# Patient Record
Sex: MALE | Race: WHITE | NOT HISPANIC OR LATINO | URBAN - METROPOLITAN AREA
[De-identification: names, ages, dates, MRNs, and addresses within clinical notes are randomized per-mention and may not be internally consistent; named-entity substitution may affect disease eponyms.]

---

## 2021-02-18 ENCOUNTER — INPATIENT (INPATIENT)
Facility: HOSPITAL | Age: 76
LOS: 17 days | Discharge: NURSING, REHAB OR LONG-TERM CARE | DRG: 870 | End: 2021-03-08
Attending: INTERNAL MEDICINE | Admitting: INTERNAL MEDICINE
Payer: MEDICARE

## 2021-02-18 VITALS
RESPIRATION RATE: 27 BRPM | TEMPERATURE: 103 F | HEART RATE: 108 BPM | WEIGHT: 270.29 LBS | SYSTOLIC BLOOD PRESSURE: 142 MMHG | OXYGEN SATURATION: 97 % | DIASTOLIC BLOOD PRESSURE: 108 MMHG

## 2021-02-18 DIAGNOSIS — Z90.49 ACQUIRED ABSENCE OF OTHER SPECIFIED PARTS OF DIGESTIVE TRACT: Chronic | ICD-10-CM

## 2021-02-18 DIAGNOSIS — J96.90 RESPIRATORY FAILURE, UNSPECIFIED, UNSPECIFIED WHETHER WITH HYPOXIA OR HYPERCAPNIA: ICD-10-CM

## 2021-02-18 LAB
ALBUMIN SERPL ELPH-MCNC: 2.5 G/DL — LOW (ref 3.5–5)
ALP SERPL-CCNC: 81 U/L — SIGNIFICANT CHANGE UP (ref 40–120)
ALT FLD-CCNC: 40 U/L DA — SIGNIFICANT CHANGE UP (ref 10–60)
ANION GAP SERPL CALC-SCNC: 4 MMOL/L — LOW (ref 5–17)
APPEARANCE UR: CLEAR — SIGNIFICANT CHANGE UP
APTT BLD: 31.6 SEC — SIGNIFICANT CHANGE UP (ref 27.5–35.5)
AST SERPL-CCNC: 40 U/L — SIGNIFICANT CHANGE UP (ref 10–40)
BACTERIA # UR AUTO: ABNORMAL /HPF
BASE EXCESS BLDA CALC-SCNC: -3.7 MMOL/L — LOW (ref -2–2)
BASE EXCESS BLDV CALC-SCNC: 0.5 MMOL/L — SIGNIFICANT CHANGE UP (ref -2–2)
BASOPHILS # BLD AUTO: 0.01 K/UL — SIGNIFICANT CHANGE UP (ref 0–0.2)
BASOPHILS NFR BLD AUTO: 0.2 % — SIGNIFICANT CHANGE UP (ref 0–2)
BILIRUB SERPL-MCNC: 0.5 MG/DL — SIGNIFICANT CHANGE UP (ref 0.2–1.2)
BILIRUB UR-MCNC: NEGATIVE — SIGNIFICANT CHANGE UP
BLOOD GAS COMMENTS ARTERIAL: SIGNIFICANT CHANGE UP
BLOOD GAS COMMENTS, VENOUS: SIGNIFICANT CHANGE UP
BUN SERPL-MCNC: 40 MG/DL — HIGH (ref 7–18)
CALCIUM SERPL-MCNC: 8.2 MG/DL — LOW (ref 8.4–10.5)
CHLORIDE SERPL-SCNC: 112 MMOL/L — HIGH (ref 96–108)
CO2 SERPL-SCNC: 27 MMOL/L — SIGNIFICANT CHANGE UP (ref 22–31)
COLOR SPEC: YELLOW — SIGNIFICANT CHANGE UP
CREAT SERPL-MCNC: 1.56 MG/DL — HIGH (ref 0.5–1.3)
DIFF PNL FLD: ABNORMAL
EOSINOPHIL # BLD AUTO: 0.04 K/UL — SIGNIFICANT CHANGE UP (ref 0–0.5)
EOSINOPHIL NFR BLD AUTO: 0.6 % — SIGNIFICANT CHANGE UP (ref 0–6)
EPI CELLS # UR: ABNORMAL /HPF
GLUCOSE SERPL-MCNC: 163 MG/DL — HIGH (ref 70–99)
GLUCOSE UR QL: 100 MG/DL
HCO3 BLDA-SCNC: 21 MMOL/L — LOW (ref 23–27)
HCO3 BLDV-SCNC: 26 MMOL/L — SIGNIFICANT CHANGE UP (ref 21–29)
HCT VFR BLD CALC: 41.7 % — SIGNIFICANT CHANGE UP (ref 39–50)
HGB BLD-MCNC: 13.3 G/DL — SIGNIFICANT CHANGE UP (ref 13–17)
HOROWITZ INDEX BLDA+IHG-RTO: 100 — SIGNIFICANT CHANGE UP
HOROWITZ INDEX BLDV+IHG-RTO: 100 — SIGNIFICANT CHANGE UP
IMM GRANULOCYTES NFR BLD AUTO: 0.5 % — SIGNIFICANT CHANGE UP (ref 0–1.5)
INR BLD: 1.38 RATIO — HIGH (ref 0.88–1.16)
KETONES UR-MCNC: NEGATIVE — SIGNIFICANT CHANGE UP
LACTATE SERPL-SCNC: 1.2 MMOL/L — SIGNIFICANT CHANGE UP (ref 0.7–2)
LEUKOCYTE ESTERASE UR-ACNC: ABNORMAL
LYMPHOCYTES # BLD AUTO: 1.03 K/UL — SIGNIFICANT CHANGE UP (ref 1–3.3)
LYMPHOCYTES # BLD AUTO: 16.2 % — SIGNIFICANT CHANGE UP (ref 13–44)
MCHC RBC-ENTMCNC: 31.5 PG — SIGNIFICANT CHANGE UP (ref 27–34)
MCHC RBC-ENTMCNC: 31.9 GM/DL — LOW (ref 32–36)
MCV RBC AUTO: 98.8 FL — SIGNIFICANT CHANGE UP (ref 80–100)
MONOCYTES # BLD AUTO: 0.28 K/UL — SIGNIFICANT CHANGE UP (ref 0–0.9)
MONOCYTES NFR BLD AUTO: 4.4 % — SIGNIFICANT CHANGE UP (ref 2–14)
NEUTROPHILS # BLD AUTO: 4.98 K/UL — SIGNIFICANT CHANGE UP (ref 1.8–7.4)
NEUTROPHILS NFR BLD AUTO: 78.1 % — HIGH (ref 43–77)
NITRITE UR-MCNC: NEGATIVE — SIGNIFICANT CHANGE UP
NRBC # BLD: 0 /100 WBCS — SIGNIFICANT CHANGE UP (ref 0–0)
PCO2 BLDA: 41 MMHG — SIGNIFICANT CHANGE UP (ref 32–46)
PCO2 BLDV: 48 MMHG — SIGNIFICANT CHANGE UP (ref 35–50)
PH BLDA: 7.34 — LOW (ref 7.35–7.45)
PH BLDV: 7.35 — SIGNIFICANT CHANGE UP (ref 7.35–7.45)
PH UR: 6 — SIGNIFICANT CHANGE UP (ref 5–8)
PLATELET # BLD AUTO: 150 K/UL — SIGNIFICANT CHANGE UP (ref 150–400)
PO2 BLDA: 356 MMHG — HIGH (ref 74–108)
PO2 BLDV: 36 MMHG — SIGNIFICANT CHANGE UP (ref 25–45)
POTASSIUM SERPL-MCNC: 4.6 MMOL/L — SIGNIFICANT CHANGE UP (ref 3.5–5.3)
POTASSIUM SERPL-SCNC: 4.6 MMOL/L — SIGNIFICANT CHANGE UP (ref 3.5–5.3)
PROT SERPL-MCNC: 6.6 G/DL — SIGNIFICANT CHANGE UP (ref 6–8.3)
PROT UR-MCNC: 30 MG/DL
PROTHROM AB SERPL-ACNC: 16.2 SEC — HIGH (ref 10.6–13.6)
RBC # BLD: 4.22 M/UL — SIGNIFICANT CHANGE UP (ref 4.2–5.8)
RBC # FLD: 11.5 % — SIGNIFICANT CHANGE UP (ref 10.3–14.5)
RBC CASTS # UR COMP ASSIST: ABNORMAL /HPF (ref 0–2)
SAO2 % BLDA: 100 % — HIGH (ref 92–96)
SAO2 % BLDV: 63 % — LOW (ref 67–88)
SODIUM SERPL-SCNC: 143 MMOL/L — SIGNIFICANT CHANGE UP (ref 135–145)
SP GR SPEC: 1.01 — SIGNIFICANT CHANGE UP (ref 1.01–1.02)
TROPONIN I SERPL-MCNC: 0.02 NG/ML — SIGNIFICANT CHANGE UP (ref 0–0.04)
UROBILINOGEN FLD QL: NEGATIVE — SIGNIFICANT CHANGE UP
WBC # BLD: 6.37 K/UL — SIGNIFICANT CHANGE UP (ref 3.8–10.5)
WBC # FLD AUTO: 6.37 K/UL — SIGNIFICANT CHANGE UP (ref 3.8–10.5)
WBC UR QL: ABNORMAL /HPF (ref 0–5)

## 2021-02-18 PROCEDURE — 36556 INSERT NON-TUNNEL CV CATH: CPT

## 2021-02-18 PROCEDURE — 99291 CRITICAL CARE FIRST HOUR: CPT | Mod: 25

## 2021-02-18 PROCEDURE — 31500 INSERT EMERGENCY AIRWAY: CPT

## 2021-02-18 PROCEDURE — 71045 X-RAY EXAM CHEST 1 VIEW: CPT | Mod: 26

## 2021-02-18 RX ORDER — BUDESONIDE AND FORMOTEROL FUMARATE DIHYDRATE 160; 4.5 UG/1; UG/1
2 AEROSOL RESPIRATORY (INHALATION)
Qty: 0 | Refills: 0 | DISCHARGE

## 2021-02-18 RX ORDER — RIVAROXABAN 15 MG-20MG
20 KIT ORAL DAILY
Refills: 0 | Status: DISCONTINUED | OUTPATIENT
Start: 2021-02-18 | End: 2021-02-19

## 2021-02-18 RX ORDER — VANCOMYCIN HCL 1 G
1000 VIAL (EA) INTRAVENOUS ONCE
Refills: 0 | Status: COMPLETED | OUTPATIENT
Start: 2021-02-18 | End: 2021-02-18

## 2021-02-18 RX ORDER — ETOMIDATE 2 MG/ML
20 INJECTION INTRAVENOUS ONCE
Refills: 0 | Status: COMPLETED | OUTPATIENT
Start: 2021-02-18 | End: 2021-02-18

## 2021-02-18 RX ORDER — CHLORHEXIDINE GLUCONATE 213 G/1000ML
1 SOLUTION TOPICAL
Refills: 0 | Status: DISCONTINUED | OUTPATIENT
Start: 2021-02-18 | End: 2021-03-08

## 2021-02-18 RX ORDER — RIVAROXABAN 15 MG-20MG
1 KIT ORAL
Qty: 0 | Refills: 0 | DISCHARGE

## 2021-02-18 RX ORDER — ACETAMINOPHEN 500 MG
650 TABLET ORAL EVERY 6 HOURS
Refills: 0 | Status: DISCONTINUED | OUTPATIENT
Start: 2021-02-18 | End: 2021-02-19

## 2021-02-18 RX ORDER — NOREPINEPHRINE BITARTRATE/D5W 8 MG/250ML
0.05 PLASTIC BAG, INJECTION (ML) INTRAVENOUS
Qty: 8 | Refills: 0 | Status: DISCONTINUED | OUTPATIENT
Start: 2021-02-18 | End: 2021-02-19

## 2021-02-18 RX ORDER — DEXTROSE 50 % IN WATER 50 %
25 SYRINGE (ML) INTRAVENOUS ONCE
Refills: 0 | Status: DISCONTINUED | OUTPATIENT
Start: 2021-02-18 | End: 2021-03-08

## 2021-02-18 RX ORDER — CHLORHEXIDINE GLUCONATE 213 G/1000ML
15 SOLUTION TOPICAL EVERY 12 HOURS
Refills: 0 | Status: DISCONTINUED | OUTPATIENT
Start: 2021-02-18 | End: 2021-02-26

## 2021-02-18 RX ORDER — ATORVASTATIN CALCIUM 80 MG/1
80 TABLET, FILM COATED ORAL AT BEDTIME
Refills: 0 | Status: DISCONTINUED | OUTPATIENT
Start: 2021-02-18 | End: 2021-02-19

## 2021-02-18 RX ORDER — SODIUM CHLORIDE 9 MG/ML
3000 INJECTION INTRAMUSCULAR; INTRAVENOUS; SUBCUTANEOUS ONCE
Refills: 0 | Status: COMPLETED | OUTPATIENT
Start: 2021-02-18 | End: 2021-02-18

## 2021-02-18 RX ORDER — ACETAMINOPHEN 500 MG
650 TABLET ORAL ONCE
Refills: 0 | Status: COMPLETED | OUTPATIENT
Start: 2021-02-18 | End: 2021-02-18

## 2021-02-18 RX ORDER — INSULIN LISPRO 100/ML
VIAL (ML) SUBCUTANEOUS EVERY 6 HOURS
Refills: 0 | Status: DISCONTINUED | OUTPATIENT
Start: 2021-02-18 | End: 2021-02-26

## 2021-02-18 RX ORDER — PANTOPRAZOLE SODIUM 20 MG/1
40 TABLET, DELAYED RELEASE ORAL DAILY
Refills: 0 | Status: DISCONTINUED | OUTPATIENT
Start: 2021-02-18 | End: 2021-02-26

## 2021-02-18 RX ORDER — PIPERACILLIN AND TAZOBACTAM 4; .5 G/20ML; G/20ML
3.38 INJECTION, POWDER, LYOPHILIZED, FOR SOLUTION INTRAVENOUS ONCE
Refills: 0 | Status: COMPLETED | OUTPATIENT
Start: 2021-02-18 | End: 2021-02-18

## 2021-02-18 RX ORDER — ASPIRIN/CALCIUM CARB/MAGNESIUM 324 MG
81 TABLET ORAL DAILY
Refills: 0 | Status: DISCONTINUED | OUTPATIENT
Start: 2021-02-18 | End: 2021-02-19

## 2021-02-18 RX ORDER — ROCURONIUM BROMIDE 10 MG/ML
100 VIAL (ML) INTRAVENOUS ONCE
Refills: 0 | Status: COMPLETED | OUTPATIENT
Start: 2021-02-18 | End: 2021-02-18

## 2021-02-18 RX ORDER — PROPOFOL 10 MG/ML
5 INJECTION, EMULSION INTRAVENOUS
Qty: 1000 | Refills: 0 | Status: DISCONTINUED | OUTPATIENT
Start: 2021-02-18 | End: 2021-02-22

## 2021-02-18 RX ADMIN — Medication 11.5 MICROGRAM(S)/KG/MIN: at 21:30

## 2021-02-18 RX ADMIN — PIPERACILLIN AND TAZOBACTAM 200 GRAM(S): 4; .5 INJECTION, POWDER, LYOPHILIZED, FOR SOLUTION INTRAVENOUS at 21:02

## 2021-02-18 RX ADMIN — Medication 250 MILLIGRAM(S): at 21:04

## 2021-02-18 RX ADMIN — Medication 100 MILLIGRAM(S): at 20:30

## 2021-02-18 RX ADMIN — PROPOFOL 3.68 MICROGRAM(S)/KG/MIN: 10 INJECTION, EMULSION INTRAVENOUS at 22:03

## 2021-02-18 RX ADMIN — SODIUM CHLORIDE 6000 MILLILITER(S): 9 INJECTION INTRAMUSCULAR; INTRAVENOUS; SUBCUTANEOUS at 21:10

## 2021-02-18 RX ADMIN — ETOMIDATE 20 MILLIGRAM(S): 2 INJECTION INTRAVENOUS at 20:31

## 2021-02-18 RX ADMIN — Medication 650 MILLIGRAM(S): at 21:08

## 2021-02-18 NOTE — H&P ADULT - ATTENDING COMMENTS
MICU ATTENDING. I have personally seen and examined the pt. I was physically present for the key elements service provided. I agree with above history, physical and plan which I edited where appropriate. I total spent 35 min critical care time.  75 from rehab after recent stroke brought unresponsive to Er. Intubated in ER fro airway protection, hypotensive after intubation, required pressors for Bp support.   A/P hypoxic resp failure   shock   URSULA on CKD   UTI  S/P CVA   Altered mental status    MICU   CONT MV, taper FIO2 as tolerated  hydration, pressors for BP support  Monitor urine output, renal function   Panculture, IV antibiotics  CT head  EEG, Neuro eval

## 2021-02-18 NOTE — H&P ADULT - NSICDXPASTMEDICALHX_GEN_ALL_CORE_FT
PAST MEDICAL HISTORY:  BPH (benign prostatic hyperplasia)     Diabetes     Hyperlipidemia     Hypertension     Stroke 7/2020 , 1/2021

## 2021-02-18 NOTE — ED ADULT NURSE NOTE - NSIMPLEMENTINTERV_GEN_ALL_ED
Implemented All Fall Risk Interventions:  Ernul to call system. Call bell, personal items and telephone within reach. Instruct patient to call for assistance. Room bathroom lighting operational. Non-slip footwear when patient is off stretcher. Physically safe environment: no spills, clutter or unnecessary equipment. Stretcher in lowest position, wheels locked, appropriate side rails in place. Provide visual cue, wrist band, yellow gown, etc. Monitor gait and stability. Monitor for mental status changes and reorient to person, place, and time. Review medications for side effects contributing to fall risk. Reinforce activity limits and safety measures with patient and family.

## 2021-02-18 NOTE — H&P ADULT - HISTORY OF PRESENT ILLNESS
75 Y M from [facility] rehab, non ambulatory with PMhx of CVA( twice first in July 2020, second 3 weeks ago), HTN, HLD, Prediabetes,BPH was brought in from NH for unresponsiveness. As per NH papers, pt was lethargic today and then become unresponsive so EMS was called. As per wife Polish speaking(  [id number]) Pt was confused and had memory loss after the stroke 3 weeks ago when he was admitted to [facility] and then discharged to Rehab on 2/8/21. Pt was walking with walker after the first stroke and not much ambulatory after the second stroke. No other history available.     ED course: Pt was unresponsive so was intubated in The ED and Femoral line was placed by the ED physician   Vitals : Febrile to 103 F , , /108  Labs : wbc 6k ,lactate 1.2, Bun/CR 40/1.56  T1 0.018  ECG : NSR   CXR : clear     SH : non smoker , drinks wine ocassionally , no drug use     GOC : FULL CODE

## 2021-02-18 NOTE — ED PROVIDER NOTE - CLINICAL SUMMARY MEDICAL DECISION MAKING FREE TEXT BOX
Pt in respiratory failure, febrile, iv fluids, abx, intubated, icu consulted. Pressors started for hypotension.

## 2021-02-18 NOTE — ED PROVIDER NOTE - OBJECTIVE STATEMENT
75 year old male with a history of hyperlipidemia, htn, cva, diabetes was brought to the ED unresponsive, being given oxygen via BVM by EMS because he was not breathing. No other history available at this time.

## 2021-02-18 NOTE — H&P ADULT - NSHPPHYSICALEXAM_GEN_ALL_CORE
GENERAL: Obese M intubated   EYES: , PERRLA, conj clear  NECK: Supple, No JVD  CHEST/LUNG: dec BS on the right,No rales, rhonchi, wheezing   HEART: Regular rate and rhythm; No murmurs, +ve S1 S2   ABDOMEN: Soft,  Nondistended; Bowel sounds present  NERVOUS SYSTEM:  Sedated      EXTREMITIES:   No clubbing, cyanosis. 1+ pitting edema b/l   LYMPH NODES : non palpable

## 2021-02-18 NOTE — ED PROVIDER NOTE - PROGRESS NOTE DETAILS
[facility] ICU Attending Jay Pt has abnormal penile anatomy, surgical house officer called to assist with jackson.

## 2021-02-18 NOTE — ED ADULT NURSE REASSESSMENT NOTE - NS ED NURSE REASSESS COMMENT FT1
brought in by EMS as notification from NH with respiraTORY  distress. intubated by mariposa adair  .placed on vent  PRVC 100%/500/15/18 size 7.5 ET tube placement at 23 cm rt.lip line. BP 60/40 startted on levophed at 0.05 mcq/kg/min. sepsis protocol initiated. medication given as ordered.

## 2021-02-18 NOTE — H&P ADULT - ASSESSMENT
75 Y M from [facility] rehab, non ambulatory with PMhx of CVA( twice first in July 2020, second 3 weeks ago), HTN, HLD, Prediabetes,BPH was brought in from NH for unresponsiveness.   ED course: Pt was unresponsive so was intubated in The ED and Femoral line was placed by the ED physician   Vitals : Febrile to 103 F , , /108  Labs : wbc 6k ,lactate 1.2, Bun/CR 40/1.56  T1 0.018  ECG : NSR   CXR : clear    Pt will be admitted to ICU for Acute hypoxic Respiratory Failure and Septic shock         Assessment:  1. Acute Hypoxic Respiratory Failure     2. Acute Encephalopathy   3. Septic shock   4. CVA   5. pre-diabetes   6. HTN   7. BPH     Plan:    =====================[CNS ]==============================  # Acute Encephalopathy :    - recent stroke 3 weeks ago , alert ,oriented and follows commands as per NH papers at baseline, confused and memory loss after stroke as per wife   - will obtain CTH   - d/d : stroke vs sepsis   - CXR : clear   - Pain control with fentanyl prn and sedation with propofol     # CVA :   - L post cerebral A stroke as per NH papers   - Pt is on Aspirin, xarelto and statins   - will c/w all if no bleeding on CTH     =====================[CVS ]==============================  # HTN :   - pt is on toprol, losartan and nifedipine   - Hold BP medications as pt is low post intubation   - c/w vasopressors for BP support   - trend trops   - f/u Echo     =====================[RESP ]==============================  # Acute Hypoxic Resp Failure :   - Pt was intubated as pt was unresponsive   - CXR Clear   - f/u CT chest and COVID pcr   - c/w Mech vent     =====================[ GI ]================================  # No issues :   - NGT in place     ====================[ RENAL ]=============================   # URSULA over CKD :   - baseline Cr around 1.4   - Bun/cr 40/1.56   - FC in place   - monitor urine output   - I&Os  - Monitor electrolytes     =====================[ ID ]================================  # Sepsis :   - unknown cause , febrile to 103 F, no wbc count or lactate , CXR clear   - will check UA , CT chest and a/p, PCT   - may need LP if above w/u negative ?   - f/u BCx and UCx   - s/p 1 dose of vanco and zosyn in the ED   - will c/w empirical ABx     ===================[ HEME/ONC ]===========================  # No issues :  - Hb and Plt stable   - monitor cbc daily     =====================[ ENDO ]=============================  # Pre -diabetes : -  - Last A1c 6.2   - target CBG < 180  - FS q6 while NPO, will start on ISS   - Start diet when possible    ==================[ SKIN/ CATHETERS ]======================  # no wound or skin break   # R fem line     ==================[ PROPHYLAXIS ]==========================   # Dvt : Xarelto  # Gi : Protonix     ====================[ DISPO ]==============================   - Monitor in ICU     ===================[ PROGNOSIS ]===========================  - Guarded      75 Y M from [facility] rehab, non ambulatory with PMhx of CVA( twice first in July 2020, second 3 weeks ago), HTN, HLD, Prediabetes,BPH was brought in from NH for unresponsiveness.   ED course: Pt was unresponsive so was intubated in The ED and Femoral line was placed by the ED physician   Vitals : Febrile to 103 F , , /108  Labs : wbc 6k ,lactate 1.2, Bun/CR 40/1.56  T1 0.018  ECG : NSR   CXR : clear    Pt will be admitted to ICU for Acute hypoxic Respiratory Failure and Septic shock         Assessment:  1. Acute Hypoxic Respiratory Failure     2. Acute Encephalopathy   3. Septic shock   4. CVA   5. pre-diabetes   6. HTN   7. BPH     Plan:    =====================[CNS ]==============================  # Acute Encephalopathy :    - recent stroke 3 weeks ago , alert ,oriented and follows commands as per NH papers at baseline, confused and memory loss after stroke as per wife   - will obtain CTH   - d/d : stroke vs sepsis   - CXR : clear   - Pain control with fentanyl prn and sedation with propofol     # CVA :   - L post cerebral A stroke as per NH papers   - Pt is on Aspirin, xarelto and statins   - will c/w all if no bleeding on CTH     =====================[CVS ]==============================  # Septic shock :   - likely 2/2 Urosepsis   - UA +ve   - c/w vasopressor support   - will start on Rocephin   - f/u CT a/p   - f/u BCx ,UCx     # HTN :   - pt is on toprol, losartan and nifedipine   - Hold BP medications   - c/w vasopressors for BP support   - trend trops   - f/u Echo     =====================[RESP ]==============================  # Acute Hypoxic Resp Failure :   - Pt was intubated as pt was unresponsive   - CXR Clear   - f/u CT chest and COVID pcr   - c/w Mech vent     =====================[ GI ]================================  # No issues :   - NGT in place     ====================[ RENAL ]=============================   # URSULA over CKD :   - baseline Cr around 1.4   - Bun/cr 40/1.56   - FC in place   - monitor urine output   - I&Os  - Monitor electrolytes     =====================[ ID ]================================  # Urosepsis :   -  febrile to 103 F, no wbc count or lactate , CXR clear   - UA +ve   - f/u BCx and UCx, PCt    - s/p 1 dose of vanco and zosyn in the ED   - will c/w empirical ABx     ===================[ HEME/ONC ]===========================  # No issues :  - Hb and Plt stable   - monitor cbc daily     =====================[ ENDO ]=============================  # Pre -diabetes : -  - Last A1c 6.2   - target CBG < 180  - FS q6 while NPO, will start on ISS   - Start diet when possible    ==================[ SKIN/ CATHETERS ]======================  # no wound or skin break   # R fem line     ==================[ PROPHYLAXIS ]==========================   # Dvt : Xarelto  # Gi : Protonix     ====================[ DISPO ]==============================   - Monitor in ICU     ===================[ PROGNOSIS ]===========================  - Guarded      75 Y M from [facility] rehab, non ambulatory with PMhx of CVA( twice first in July 2020, second 3 weeks ago), HTN, HLD, Prediabetes,BPH was brought in from NH for unresponsiveness.   ED course: Pt was unresponsive so was intubated in The ED and Femoral line was placed by the ED physician   Vitals : Febrile to 103 F , , /108  Labs : wbc 6k ,lactate 1.2, Bun/CR 40/1.56  T1 0.018  ECG : NSR   CXR : clear    Pt will be admitted to ICU for Acute hypoxic Respiratory Failure and Septic shock         Assessment:  1. Acute Hypoxic Respiratory Failure     2. Acute Encephalopathy   3. Septic shock   4. CVA   5. pre-diabetes   6. HTN   7. BPH     Plan:    =====================[CNS ]==============================  # Acute Encephalopathy :    - recent stroke 3 weeks ago , alert ,oriented and follows commands as per NH papers at baseline, confused and memory loss after stroke as per wife   - will obtain CTH   - d/d : stroke vs sepsis   - CXR : clear   - Pain control with fentanyl prn and sedation with propofol     # CVA :   - L post cerebral A stroke as per NH papers   - Pt is on Aspirin, xarelto and statins   - will c/w all if no bleeding on CTH   - consult Neuro TBD     =====================[CVS ]==============================  # Septic shock :   - likely 2/2 Urosepsis   - UA +ve   - c/w vasopressor support   - will start on Meropenem 1g q12 for now , can de-escalate pending culture results  - f/u CT a/p   - f/u BCx ,UCx     # HTN :   - pt is on toprol, losartan and nifedipine   - Hold BP medications   - c/w vasopressors for BP support   - trend trops   - f/u Echo     =====================[RESP ]==============================  # Acute Hypoxic Resp Failure :   - Pt was intubated as pt was unresponsive   - CXR Clear   - f/u CT chest and COVID pcr   - c/w Mech vent     =====================[ GI ]================================  # No issues :   - NGT in place     ====================[ RENAL ]=============================   # URSULA over CKD :   - baseline Cr around 1.4   - Bun/cr 40/1.56   - FC in place   - will give gentle hydration w/LR @ 75 cc x 12hrs  - monitor urine output   - I&Os  - Monitor electrolytes     =====================[ ID ]================================  # Urosepsis :   -  febrile to 103 F, no wbc count or lactate , CXR clear   - UA +ve   - f/u BCx and UCx, PCt    - s/p 1 dose of vanco and zosyn in the ED   - will c/w empirical ABx     ===================[ HEME/ONC ]===========================  # No issues :  - Hb and Plt stable   - monitor cbc daily     =====================[ ENDO ]=============================  # Pre -diabetes : -  - Last A1c 6.2   - target CBG < 180  - FS q6 while NPO, will start on ISS   - Start diet when possible    ==================[ SKIN/ CATHETERS ]======================  # no wound or skin break   # R fem line     ==================[ PROPHYLAXIS ]==========================   # Dvt : Xarelto  # Gi : Protonix     ====================[ DISPO ]==============================   - Monitor in ICU     ===================[ PROGNOSIS ]===========================  - Guarded

## 2021-02-18 NOTE — ED ADULT NURSE NOTE - OBJECTIVE STATEMENT
brought in  by ems from NH as Notification for  respiratory distress.intubated by  given rocuromium 100 mg & [id number] mg.placed on vent setting PRVC 100% ,15/18 on CM with NSR

## 2021-02-19 LAB
24R-OH-CALCIDIOL SERPL-MCNC: 38.6 NG/ML — SIGNIFICANT CHANGE UP (ref 30–80)
ALBUMIN SERPL ELPH-MCNC: 2.1 G/DL — LOW (ref 3.5–5)
ALBUMIN SERPL ELPH-MCNC: 2.2 G/DL — LOW (ref 3.5–5)
ALP SERPL-CCNC: 78 U/L — SIGNIFICANT CHANGE UP (ref 40–120)
ALP SERPL-CCNC: 79 U/L — SIGNIFICANT CHANGE UP (ref 40–120)
ALT FLD-CCNC: 37 U/L DA — SIGNIFICANT CHANGE UP (ref 10–60)
ALT FLD-CCNC: 42 U/L DA — SIGNIFICANT CHANGE UP (ref 10–60)
ANION GAP SERPL CALC-SCNC: 4 MMOL/L — LOW (ref 5–17)
ANION GAP SERPL CALC-SCNC: 6 MMOL/L — SIGNIFICANT CHANGE UP (ref 5–17)
AST SERPL-CCNC: 34 U/L — SIGNIFICANT CHANGE UP (ref 10–40)
AST SERPL-CCNC: 42 U/L — HIGH (ref 10–40)
BASE EXCESS BLDA CALC-SCNC: -2.6 MMOL/L — LOW (ref -2–2)
BASOPHILS # BLD AUTO: 0.02 K/UL — SIGNIFICANT CHANGE UP (ref 0–0.2)
BASOPHILS NFR BLD AUTO: 0.1 % — SIGNIFICANT CHANGE UP (ref 0–2)
BILIRUB SERPL-MCNC: 0.5 MG/DL — SIGNIFICANT CHANGE UP (ref 0.2–1.2)
BILIRUB SERPL-MCNC: 0.8 MG/DL — SIGNIFICANT CHANGE UP (ref 0.2–1.2)
BLOOD GAS COMMENTS ARTERIAL: SIGNIFICANT CHANGE UP
BUN SERPL-MCNC: 32 MG/DL — HIGH (ref 7–18)
BUN SERPL-MCNC: 40 MG/DL — HIGH (ref 7–18)
CALCIUM SERPL-MCNC: 8.1 MG/DL — LOW (ref 8.4–10.5)
CALCIUM SERPL-MCNC: 8.5 MG/DL — SIGNIFICANT CHANGE UP (ref 8.4–10.5)
CHLORIDE SERPL-SCNC: 111 MMOL/L — HIGH (ref 96–108)
CHLORIDE SERPL-SCNC: 114 MMOL/L — HIGH (ref 96–108)
CHOLEST SERPL-MCNC: 83 MG/DL — SIGNIFICANT CHANGE UP
CO2 SERPL-SCNC: 24 MMOL/L — SIGNIFICANT CHANGE UP (ref 22–31)
CO2 SERPL-SCNC: 27 MMOL/L — SIGNIFICANT CHANGE UP (ref 22–31)
CREAT SERPL-MCNC: 1.21 MG/DL — SIGNIFICANT CHANGE UP (ref 0.5–1.3)
CREAT SERPL-MCNC: 1.51 MG/DL — HIGH (ref 0.5–1.3)
E FAECALIS DNA BLD POS QL NAA+NON-PROBE: SIGNIFICANT CHANGE UP
EOSINOPHIL # BLD AUTO: 0.1 K/UL — SIGNIFICANT CHANGE UP (ref 0–0.5)
EOSINOPHIL NFR BLD AUTO: 0.7 % — SIGNIFICANT CHANGE UP (ref 0–6)
GLUCOSE BLDC GLUCOMTR-MCNC: 107 MG/DL — HIGH (ref 70–99)
GLUCOSE BLDC GLUCOMTR-MCNC: 134 MG/DL — HIGH (ref 70–99)
GLUCOSE BLDC GLUCOMTR-MCNC: 153 MG/DL — HIGH (ref 70–99)
GLUCOSE BLDC GLUCOMTR-MCNC: 69 MG/DL — LOW (ref 70–99)
GLUCOSE BLDC GLUCOMTR-MCNC: 86 MG/DL — SIGNIFICANT CHANGE UP (ref 70–99)
GLUCOSE SERPL-MCNC: 109 MG/DL — HIGH (ref 70–99)
GLUCOSE SERPL-MCNC: 135 MG/DL — HIGH (ref 70–99)
GRAM STN FLD: SIGNIFICANT CHANGE UP
HCO3 BLDA-SCNC: 22 MMOL/L — LOW (ref 23–27)
HCT VFR BLD CALC: 41.3 % — SIGNIFICANT CHANGE UP (ref 39–50)
HCV AB S/CO SERPL IA: 0.29 S/CO — SIGNIFICANT CHANGE UP (ref 0–0.99)
HCV AB SERPL-IMP: SIGNIFICANT CHANGE UP
HDLC SERPL-MCNC: 30 MG/DL — LOW
HGB BLD-MCNC: 13.1 G/DL — SIGNIFICANT CHANGE UP (ref 13–17)
HOROWITZ INDEX BLDA+IHG-RTO: 50 — SIGNIFICANT CHANGE UP
IMM GRANULOCYTES NFR BLD AUTO: 0.7 % — SIGNIFICANT CHANGE UP (ref 0–1.5)
LIPID PNL WITH DIRECT LDL SERPL: 31 MG/DL — SIGNIFICANT CHANGE UP
LYMPHOCYTES # BLD AUTO: 1.63 K/UL — SIGNIFICANT CHANGE UP (ref 1–3.3)
LYMPHOCYTES # BLD AUTO: 12.2 % — LOW (ref 13–44)
MAGNESIUM SERPL-MCNC: 1.9 MG/DL — SIGNIFICANT CHANGE UP (ref 1.6–2.6)
MCHC RBC-ENTMCNC: 31.4 PG — SIGNIFICANT CHANGE UP (ref 27–34)
MCHC RBC-ENTMCNC: 31.7 GM/DL — LOW (ref 32–36)
MCV RBC AUTO: 99 FL — SIGNIFICANT CHANGE UP (ref 80–100)
METHOD TYPE: SIGNIFICANT CHANGE UP
MONOCYTES # BLD AUTO: 0.54 K/UL — SIGNIFICANT CHANGE UP (ref 0–0.9)
MONOCYTES NFR BLD AUTO: 4 % — SIGNIFICANT CHANGE UP (ref 2–14)
NEUTROPHILS # BLD AUTO: 10.96 K/UL — HIGH (ref 1.8–7.4)
NEUTROPHILS NFR BLD AUTO: 82.3 % — HIGH (ref 43–77)
NON HDL CHOLESTEROL: 53 MG/DL — SIGNIFICANT CHANGE UP
NRBC # BLD: 0 /100 WBCS — SIGNIFICANT CHANGE UP (ref 0–0)
PCO2 BLDA: 40 MMHG — SIGNIFICANT CHANGE UP (ref 32–46)
PH BLDA: 7.36 — SIGNIFICANT CHANGE UP (ref 7.35–7.45)
PHOSPHATE SERPL-MCNC: 3 MG/DL — SIGNIFICANT CHANGE UP (ref 2.5–4.5)
PLATELET # BLD AUTO: 153 K/UL — SIGNIFICANT CHANGE UP (ref 150–400)
PO2 BLDA: 107 MMHG — SIGNIFICANT CHANGE UP (ref 74–108)
POTASSIUM SERPL-MCNC: 4.3 MMOL/L — SIGNIFICANT CHANGE UP (ref 3.5–5.3)
POTASSIUM SERPL-MCNC: 4.5 MMOL/L — SIGNIFICANT CHANGE UP (ref 3.5–5.3)
POTASSIUM SERPL-SCNC: 4.3 MMOL/L — SIGNIFICANT CHANGE UP (ref 3.5–5.3)
POTASSIUM SERPL-SCNC: 4.5 MMOL/L — SIGNIFICANT CHANGE UP (ref 3.5–5.3)
PROCALCITONIN SERPL-MCNC: 0.42 NG/ML — HIGH (ref 0.02–0.1)
PROT SERPL-MCNC: 6.3 G/DL — SIGNIFICANT CHANGE UP (ref 6–8.3)
PROT SERPL-MCNC: 6.4 G/DL — SIGNIFICANT CHANGE UP (ref 6–8.3)
RBC # BLD: 4.17 M/UL — LOW (ref 4.2–5.8)
RBC # FLD: 11.8 % — SIGNIFICANT CHANGE UP (ref 10.3–14.5)
SAO2 % BLDA: 98 % — HIGH (ref 92–96)
SARS-COV-2 IGG SERPL QL IA: NEGATIVE — SIGNIFICANT CHANGE UP
SARS-COV-2 IGM SERPL IA-ACNC: 0.09 INDEX — SIGNIFICANT CHANGE UP
SARS-COV-2 RNA SPEC QL NAA+PROBE: SIGNIFICANT CHANGE UP
SODIUM SERPL-SCNC: 141 MMOL/L — SIGNIFICANT CHANGE UP (ref 135–145)
SODIUM SERPL-SCNC: 145 MMOL/L — SIGNIFICANT CHANGE UP (ref 135–145)
SPECIMEN SOURCE: SIGNIFICANT CHANGE UP
TRIGL SERPL-MCNC: 108 MG/DL — SIGNIFICANT CHANGE UP
TROPONIN I SERPL-MCNC: 0.11 NG/ML — HIGH (ref 0–0.04)
TROPONIN I SERPL-MCNC: 0.17 NG/ML — HIGH (ref 0–0.04)
TSH SERPL-MCNC: 0.48 UU/ML — SIGNIFICANT CHANGE UP (ref 0.34–4.82)
VIT B12 SERPL-MCNC: 320 PG/ML — SIGNIFICANT CHANGE UP (ref 232–1245)
WBC # BLD: 13.34 K/UL — HIGH (ref 3.8–10.5)
WBC # FLD AUTO: 13.34 K/UL — HIGH (ref 3.8–10.5)

## 2021-02-19 PROCEDURE — 71045 X-RAY EXAM CHEST 1 VIEW: CPT | Mod: 26

## 2021-02-19 PROCEDURE — 99291 CRITICAL CARE FIRST HOUR: CPT

## 2021-02-19 PROCEDURE — 71250 CT THORAX DX C-: CPT | Mod: 26

## 2021-02-19 PROCEDURE — 70450 CT HEAD/BRAIN W/O DYE: CPT | Mod: 26

## 2021-02-19 PROCEDURE — 74176 CT ABD & PELVIS W/O CONTRAST: CPT | Mod: 26

## 2021-02-19 RX ORDER — SODIUM CHLORIDE 9 MG/ML
1000 INJECTION, SOLUTION INTRAVENOUS
Refills: 0 | Status: DISCONTINUED | OUTPATIENT
Start: 2021-02-19 | End: 2021-02-25

## 2021-02-19 RX ORDER — VANCOMYCIN HCL 1 G
1000 VIAL (EA) INTRAVENOUS EVERY 24 HOURS
Refills: 0 | Status: DISCONTINUED | OUTPATIENT
Start: 2021-02-20 | End: 2021-02-20

## 2021-02-19 RX ORDER — ACETAMINOPHEN 500 MG
650 TABLET ORAL EVERY 6 HOURS
Refills: 0 | Status: DISCONTINUED | OUTPATIENT
Start: 2021-02-19 | End: 2021-03-08

## 2021-02-19 RX ORDER — VANCOMYCIN HCL 1 G
VIAL (EA) INTRAVENOUS
Refills: 0 | Status: DISCONTINUED | OUTPATIENT
Start: 2021-02-19 | End: 2021-02-20

## 2021-02-19 RX ORDER — VANCOMYCIN HCL 1 G
1000 VIAL (EA) INTRAVENOUS ONCE
Refills: 0 | Status: COMPLETED | OUTPATIENT
Start: 2021-02-19 | End: 2021-02-19

## 2021-02-19 RX ORDER — ASPIRIN/CALCIUM CARB/MAGNESIUM 324 MG
81 TABLET ORAL DAILY
Refills: 0 | Status: DISCONTINUED | OUTPATIENT
Start: 2021-02-19 | End: 2021-03-08

## 2021-02-19 RX ORDER — MEROPENEM 1 G/30ML
1000 INJECTION INTRAVENOUS EVERY 12 HOURS
Refills: 0 | Status: DISCONTINUED | OUTPATIENT
Start: 2021-02-19 | End: 2021-02-22

## 2021-02-19 RX ORDER — ATORVASTATIN CALCIUM 80 MG/1
80 TABLET, FILM COATED ORAL AT BEDTIME
Refills: 0 | Status: DISCONTINUED | OUTPATIENT
Start: 2021-02-19 | End: 2021-03-08

## 2021-02-19 RX ORDER — RIVAROXABAN 15 MG-20MG
15 KIT ORAL
Refills: 0 | Status: DISCONTINUED | OUTPATIENT
Start: 2021-02-19 | End: 2021-02-20

## 2021-02-19 RX ORDER — NOREPINEPHRINE BITARTRATE/D5W 8 MG/250ML
0.05 PLASTIC BAG, INJECTION (ML) INTRAVENOUS
Qty: 16 | Refills: 0 | Status: DISCONTINUED | OUTPATIENT
Start: 2021-02-19 | End: 2021-02-24

## 2021-02-19 RX ORDER — SODIUM CHLORIDE 9 MG/ML
1000 INJECTION, SOLUTION INTRAVENOUS
Refills: 0 | Status: COMPLETED | OUTPATIENT
Start: 2021-02-19 | End: 2021-02-19

## 2021-02-19 RX ORDER — FENTANYL CITRATE 50 UG/ML
25 INJECTION INTRAVENOUS EVERY 4 HOURS
Refills: 0 | Status: DISCONTINUED | OUTPATIENT
Start: 2021-02-19 | End: 2021-02-20

## 2021-02-19 RX ADMIN — Medication 1: at 01:53

## 2021-02-19 RX ADMIN — MEROPENEM 100 MILLIGRAM(S): 1 INJECTION INTRAVENOUS at 17:39

## 2021-02-19 RX ADMIN — CHLORHEXIDINE GLUCONATE 1 APPLICATION(S): 213 SOLUTION TOPICAL at 05:00

## 2021-02-19 RX ADMIN — SODIUM CHLORIDE 75 MILLILITER(S): 9 INJECTION, SOLUTION INTRAVENOUS at 05:01

## 2021-02-19 RX ADMIN — CHLORHEXIDINE GLUCONATE 15 MILLILITER(S): 213 SOLUTION TOPICAL at 17:39

## 2021-02-19 RX ADMIN — RIVAROXABAN 15 MILLIGRAM(S): KIT at 15:49

## 2021-02-19 RX ADMIN — Medication 250 MILLIGRAM(S): at 18:07

## 2021-02-19 RX ADMIN — PANTOPRAZOLE SODIUM 40 MILLIGRAM(S): 20 TABLET, DELAYED RELEASE ORAL at 11:24

## 2021-02-19 RX ADMIN — Medication 5.75 MICROGRAM(S)/KG/MIN: at 05:04

## 2021-02-19 RX ADMIN — ATORVASTATIN CALCIUM 80 MILLIGRAM(S): 80 TABLET, FILM COATED ORAL at 23:06

## 2021-02-19 RX ADMIN — PROPOFOL 3.68 MICROGRAM(S)/KG/MIN: 10 INJECTION, EMULSION INTRAVENOUS at 17:48

## 2021-02-19 RX ADMIN — SODIUM CHLORIDE 75 MILLILITER(S): 9 INJECTION, SOLUTION INTRAVENOUS at 17:40

## 2021-02-19 RX ADMIN — PROPOFOL 3.68 MICROGRAM(S)/KG/MIN: 10 INJECTION, EMULSION INTRAVENOUS at 08:11

## 2021-02-19 RX ADMIN — MEROPENEM 100 MILLIGRAM(S): 1 INJECTION INTRAVENOUS at 05:03

## 2021-02-19 NOTE — CONSULT NOTE ADULT - SUBJECTIVE AND OBJECTIVE BOX
Time of visit:    CHIEF COMPLAINT: Patient is a 75y old  Male who presents with a chief complaint of Unresponsive (2021 12:19)      HPI:  75 Y M from [facility] rehab, non ambulatory with PMhx of CVA( twice first in 2020, second 3 weeks ago), HTN, HLD, Prediabetes,BPH was brought in from NH for unresponsiveness. As per NH papers, pt was lethargic today and then become unresponsive so EMS was called. As per wife Polish speaking(  [id number]) Pt was confused and had memory loss after the stroke 3 weeks ago when he was admitted to [facility] and then discharged to Rehab on 21. Pt was walking with walker after the first stroke and not much ambulatory after the second stroke. No other history available.     ED course: Pt was unresponsive so was intubated in The ED and Femoral line was placed by the ED physician   Vitals : Febrile to 103 F , , /108  Labs : wbc 6k ,lactate 1.2, Bun/CR 40/1.56  T1 0.018  ECG : NSR   CXR : clear     SH : non smoker , drinks wine ocassionally , no drug use     GOC : FULL CODE  (2021 22:53)   Patient seen and examined.     PAST MEDICAL & SURGICAL HISTORY:  BPH (benign prostatic hyperplasia)    Hyperlipidemia    Hypertension    Diabetes    Stroke  2020 , 2021    S/P cholecystectomy        Allergies    No Known Allergies    Intolerances        MEDICATIONS  (STANDING):  aspirin  chewable 81 milliGRAM(s) Oral daily  atorvastatin 80 milliGRAM(s) Oral at bedtime  chlorhexidine 0.12% Liquid 15 milliLiter(s) Oral Mucosa every 12 hours  chlorhexidine 2% Cloths 1 Application(s) Topical <User Schedule>  dextrose 50% Injectable 25 Gram(s) IV Push once  insulin lispro (ADMELOG) corrective regimen sliding scale   SubCutaneous every 6 hours  lactated ringers. 1000 milliLiter(s) (75 mL/Hr) IV Continuous <Continuous>  meropenem  IVPB 1000 milliGRAM(s) IV Intermittent every 12 hours  norepinephrine Infusion 0.05 MICROgram(s)/kG/Min (5.75 mL/Hr) IV Continuous <Continuous>  pantoprazole  Injectable 40 milliGRAM(s) IV Push daily  propofol Infusion 5 MICROgram(s)/kG/Min (3.68 mL/Hr) IV Continuous <Continuous>  rivaroxaban 15 milliGRAM(s) Oral with dinner      MEDICATIONS  (PRN):  acetaminophen    Suspension .. 650 milliGRAM(s) Oral every 6 hours PRN Temp greater or equal to 38C (100.4F), Mild Pain (1 - 3)  fentaNYL    Injectable 25 MICROGram(s) IV Push every 4 hours PRN Severe Pain (7 - 10)   Medications up to date at time of exam.    Medications up to date at time of exam.    FAMILY HISTORY:      SOCIAL HISTORY  Smoking History: [   ] smoking/smoke exposure, [   ] former smoker  Living Condition: [   ] apartment, [   ] private house  Work History:   Travel History: denies recent travel  Illicit Substance Use: denies  Alcohol Use: denies    REVIEW OF SYSTEMS:    CONSTITUTIONAL:  denies fevers, chills, sweats, weight loss    HEENT:  denies diplopia or blurred vision, sore throat or runny nose.    CARDIOVASCULAR:  denies pressure, squeezing, tightness, or heaviness about the chest; no palpitations.    RESPIRATORY:  denies SOB, cough, MARRERO, wheezing.    GASTROINTESTINAL:  denies abdominal pain, nausea, vomiting or diarrhea.    GENITOURINARY: denies dysuria, frequency or urgency.    NEUROLOGIC:  denies numbness, tingling, seizures or weakness.    PSYCHIATRIC:  denies disorder of thought or mood.    MSK: denies swelling, redness      PHYSICAL EXAMINATION:    GENERAL: The patient is a well-developed, well-nourished, in no apparent distress.     Vital Signs Last 24 Hrs  T(C): 37.8 (2021 12:00), Max: 39.4 (2021 20:16)  T(F): 100 (2021 12:00), Max: 103 (2021 20:16)  HR: 81 (2021 12:46) (74 - 114)  BP: 159/71 (2021 12:00) (60/40 - 159/71)  BP(mean): 97 (2021 12:00) (80 - 105)  RR: 18 (2021 12:00) (18 - 27)  SpO2: 97% (2021 12:46) (97% - 100%)  Mode: AC/ CMV (Assist Control/ Continuous Mandatory Ventilation)  RR (machine): 18  TV (machine): 450  FiO2: 40  PEEP: 5  ITime: 1  MAP: 10  PIP: 26   (if applicable)    Chest Tube (if applicable)    HEENT: Head is normocephalic and atraumatic. Extraocular muscles are intact. Mucous membranes are moist.     NECK: Supple, no palpable adenopathy.    LUNGS: Clear to auscultation, no wheezing, rales, or rhonchi.    HEART: Regular rate and rhythm without murmur.    ABDOMEN: Soft, nontender, and nondistended.  No hepatosplenomegaly is noted.    RENAL: No difficulty voiding, no pelvic pain    EXTREMITIES: Without any cyanosis, clubbing, rash, lesions or edema.    NEUROLOGIC: Awake, alert, oriented, grossly intact    SKIN: Warm, dry, good turgor.      LABS:                        13.1   13.34 )-----------( 153      ( 2021 06:43 )             41.3         141  |  111<H>  |  40<H>  ----------------------------<  135<H>  4.3   |  24  |  1.51<H>    Ca    8.1<L>      2021 06:43  Phos  3.0     -  Mg     1.9         TPro  6.4  /  Alb  2.2<L>  /  TBili  0.8  /  DBili  x   /  AST  42<H>  /  ALT  42  /  AlkPhos  78  -    PT/INR - ( 2021 20:34 )   PT: 16.2 sec;   INR: 1.38 ratio         PTT - ( 2021 20:34 )  PTT:31.6 sec  Urinalysis Basic - ( 2021 23:07 )    Color: Yellow / Appearance: Clear / S.010 / pH: x  Gluc: x / Ketone: Negative  / Bili: Negative / Urobili: Negative   Blood: x / Protein: 30 mg/dL / Nitrite: Negative   Leuk Esterase: Small / RBC: 10-25 /HPF / WBC 6-10 /HPF   Sq Epi: x / Non Sq Epi: Occasional /HPF / Bacteria: Many /HPF      ABG - ( 2021 03:24 )  pH, Arterial: 7.36  pH, Blood: x     /  pCO2: 40    /  pO2: 107   / HCO3: 22    / Base Excess: -2.6  /  SaO2: 98                CARDIAC MARKERS ( 2021 06:43 )  0.166 ng/mL / x     / x     / x     / x      CARDIAC MARKERS ( 2021 20:34 )  0.018 ng/mL / x     / x     / x     / x              Lactate, Blood: 1.2 mmol/L (21 @ 20:34)    Procalcitonin, Serum: 0.42 ng/mL (21 @ 10:00)      MICROBIOLOGY: (if applicable)    RADIOLOGY & ADDITIONAL STUDIES:  EKG:   CXR:  ECHO:    IMPRESSION: 75y Male PAST MEDICAL & SURGICAL HISTORY:  BPH (benign prostatic hyperplasia)    Hyperlipidemia    Hypertension    Diabetes    Stroke  2020 , 2021    S/P cholecystectomy     p/w                   RECOMMENDATIONS:   Time of visit:    CHIEF COMPLAINT: Patient is a 75y old  Male who presents with a chief complaint of Unresponsive (2021 12:19)      HPI:  75 Y M from [facility] rehab, non ambulatory with PMhx of CVA( twice first in 2020, second 3 weeks ago), HTN, HLD, Prediabetes,BPH was brought in from NH for unresponsiveness. As per NH papers, pt was lethargic today and then become unresponsive so EMS was called. As per wife Polish speaking(  [id number]) Pt was confused and had memory loss after the stroke 3 weeks ago when he was admitted to [facility] and then discharged to Rehab on 21. Pt was walking with walker after the first stroke and not much ambulatory after the second stroke. No other history available.     ED course: Pt was unresponsive so was intubated in The ED and Femoral line was placed by the ED physician   Vitals : Febrile to 103 F , , /108  Labs : wbc 6k ,lactate 1.2, Bun/CR 40/1.56  T1 0.018  ECG : NSR   CXR : clear     SH : non smoker , drinks wine ocassionally , no drug use     GOC : FULL CODE  (2021 22:53)   Patient seen and examined.     PAST MEDICAL & SURGICAL HISTORY:  BPH (benign prostatic hyperplasia)    Hyperlipidemia    Hypertension    Diabetes    Stroke  2020 , 2021    S/P cholecystectomy        Allergies    No Known Allergies    Intolerances        MEDICATIONS  (STANDING):  aspirin  chewable 81 milliGRAM(s) Oral daily  atorvastatin 80 milliGRAM(s) Oral at bedtime  chlorhexidine 0.12% Liquid 15 milliLiter(s) Oral Mucosa every 12 hours  chlorhexidine 2% Cloths 1 Application(s) Topical <User Schedule>  dextrose 50% Injectable 25 Gram(s) IV Push once  insulin lispro (ADMELOG) corrective regimen sliding scale   SubCutaneous every 6 hours  lactated ringers. 1000 milliLiter(s) (75 mL/Hr) IV Continuous <Continuous>  meropenem  IVPB 1000 milliGRAM(s) IV Intermittent every 12 hours  norepinephrine Infusion 0.05 MICROgram(s)/kG/Min (5.75 mL/Hr) IV Continuous <Continuous>  pantoprazole  Injectable 40 milliGRAM(s) IV Push daily  propofol Infusion 5 MICROgram(s)/kG/Min (3.68 mL/Hr) IV Continuous <Continuous>  rivaroxaban 15 milliGRAM(s) Oral with dinner      MEDICATIONS  (PRN):  acetaminophen    Suspension .. 650 milliGRAM(s) Oral every 6 hours PRN Temp greater or equal to 38C (100.4F), Mild Pain (1 - 3)  fentaNYL    Injectable 25 MICROGram(s) IV Push every 4 hours PRN Severe Pain (7 - 10)   Medications up to date at time of exam.    Medications up to date at time of exam.    FAMILY HISTORY:      SOCIAL HISTORY pat is intubated and sedated   Smoking History: [   ] smoking/smoke exposure, [   ] former smoker  Living Condition: [   ] apartment, [   ] private house  Work History:   Travel History: denies recent travel  Illicit Substance Use: denies  Alcohol Use: denies    REVIEW OF SYSTEMS: as per EMR    CONSTITUTIONAL:  denies fevers, chills, sweats, weight loss    HEENT:  denies diplopia or blurred vision, sore throat or runny nose.    CARDIOVASCULAR:  denies pressure, squeezing, tightness, or heaviness about the chest; no palpitations.    RESPIRATORY:  denies SOB, cough, MARRERO, wheezing.    GASTROINTESTINAL:  denies abdominal pain, nausea, vomiting or diarrhea.    GENITOURINARY: denies dysuria, frequency or urgency.    NEUROLOGIC:  denies numbness, tingling, seizures or weakness.    PSYCHIATRIC:  denies disorder of thought or mood.    MSK: denies swelling, redness      PHYSICAL EXAMINATION:    GENERAL: The patient is a well-developed, well-nourished, in no apparent distress.     Vital Signs Last 24 Hrs  T(C): 37.8 (2021 12:00), Max: 39.4 (2021 20:16)  T(F): 100 (2021 12:00), Max: 103 (2021 20:16)  HR: 81 (2021 12:46) (74 - 114)  BP: 159/71 (2021 12:00) (60/40 - 159/71)  BP(mean): 97 (2021 12:00) (80 - 105)  RR: 18 (2021 12:00) (18 - 27)  SpO2: 97% (2021 12:46) (97% - 100%)  Mode: AC/ CMV (Assist Control/ Continuous Mandatory Ventilation)  RR (machine): 18  TV (machine): 450  FiO2: 40  PEEP: 5  ITime: 1  MAP: 10  PIP: 26   (if applicable)    Chest Tube (if applicable)    HEENT: Head is normocephalic and atraumatic. Extraocular muscles are intact. Mucous membranes are moist.  +ETT    NECK: Supple, no palpable adenopathy.    LUNGS: Clear to auscultation, no wheezing, rales, or rhonchi.    HEART: Regular rate and rhythm without murmur.    ABDOMEN: Soft, nontender, and nondistended.  No hepatosplenomegaly is noted.    RENAL: No difficulty voiding, no pelvic pain    EXTREMITIES: Without any cyanosis, clubbing, rash, lesions or edema.    NEUROLOGIC: sedated     SKIN: Warm, dry, good turgor.      LABS:                        13.1   13.34 )-----------( 153      ( 2021 06:43 )             41.3         141  |  111<H>  |  40<H>  ----------------------------<  135<H>  4.3   |  24  |  1.51<H>    Ca    8.1<L>      2021 06:43  Phos  3.0     -  Mg     1.9         TPro  6.4  /  Alb  2.2<L>  /  TBili  0.8  /  DBili  x   /  AST  42<H>  /  ALT  42  /  AlkPhos  78      PT/INR - ( 2021 20:34 )   PT: 16.2 sec;   INR: 1.38 ratio         PTT - ( 2021 20:34 )  PTT:31.6 sec  Urinalysis Basic - ( 2021 23:07 )    Color: Yellow / Appearance: Clear / S.010 / pH: x  Gluc: x / Ketone: Negative  / Bili: Negative / Urobili: Negative   Blood: x / Protein: 30 mg/dL / Nitrite: Negative   Leuk Esterase: Small / RBC: 10-25 /HPF / WBC 6-10 /HPF   Sq Epi: x / Non Sq Epi: Occasional /HPF / Bacteria: Many /HPF      ABG - ( 2021 03:24 )  pH, Arterial: 7.36  pH, Blood: x     /  pCO2: 40    /  pO2: 107   / HCO3: 22    / Base Excess: -2.6  /  SaO2: 98                CARDIAC MARKERS ( 2021 06:43 )  0.166 ng/mL / x     / x     / x     / x      CARDIAC MARKERS ( 2021 20:34 )  0.018 ng/mL / x     / x     / x     / x              Lactate, Blood: 1.2 mmol/L (21 @ 20:34)    Procalcitonin, Serum: 0.42 ng/mL (21 @ 10:00)      MICROBIOLOGY: (if applicable)    RADIOLOGY & ADDITIONAL STUDIES:  EKG:   CXR:< from: Xray Chest 1 View- PORTABLE-Routine (Xray Chest 1 View- PORTABLE-Routine in AM.) (21 @ 10:23) >    EXAM:  XR CHEST PORTABLE ROUTINE 1V                            PROCEDURE DATE:  2021          INTERPRETATION:  CLINICAL INDICATION: 75 years  Male with interval change.    COMPARISON: 2021    The patient is rotated to the right.    The tip of the ET tube is in the mid trachea. The enteric tube extends below the diaphragm. The tip is not included in the image.    AP view of the chest demonstrates a left lower lobe infiltrate. The right lung is clear. There is no pleural effusion. There is no pneumothorax.    The heart, mediastinum and kai cannot be assessed due to rotation.    The pulmonary vasculature is normal.    Mild thoracic degenerative changes are present.    IMPRESSION:    Left lower lobe infiltrate.    ET tube and enteric catheter in satisfactory position.                    RANDY PATEL MD; Attending Radiologist  This document has been electronically signed. 2021 11:33AM    < end of copied text >    ECHO:    IMPRESSION: 75y Male PAST MEDICAL & SURGICAL HISTORY:  BPH (benign prostatic hyperplasia)    Hyperlipidemia    Hypertension    Diabetes    Stroke  2020 , 2021    S/P cholecystectomy     p/w               IMP: This is a 75 yr old man  from [facility] rehab, non ambulatory with  CVA( twice first in 2020, second 3 weeks ago), HTN, HLD, Prediabetes,BPH was brought in from NH for unresponsiveness.   ED course: Pt was unresponsive so was intubated in The ED and Femoral line was placed by the ED physician   Vitals : Febrile to 103 F , , /108  Labs : wbc 6k ,lactate 1.2, Bun/CR 40/1.56  T1 0.018  ECG : NSR   CXR : clear    Pt will be admitted to ICU for Acute hypoxic Respiratory Failure and Septic shock . Intubated in ER on admission         Assessment:  1. Acute Hypoxic Respiratory Failure     2. Acute Encephalopathy   3. Septic shock   4. CVA   5. pre-diabetes   6. HTN   7. BPH       Plan  -continue vent support  -vent adjust as per ABG  -pressors to maintain MAP>65  -sedated and pain control   -hemodynamic monitoring   -f/u cultures   -dvt/gi prophy

## 2021-02-19 NOTE — PROGRESS NOTE ADULT - SUBJECTIVE AND OBJECTIVE BOX
Patient is a 75y old  Male who presents with a chief complaint of Unresponsive (2021 22:53)      INTERVAL HPI/OVERNIGHT EVENTS:  ICU Vital Signs Last 24 Hrs  T(C): 37.8 (2021 08:00), Max: 39.4 (2021 20:16)  T(F): 100 (2021 08:00), Max: 103 (2021 20:16)  HR: 77 (2021 12:00) (74 - 114)  BP: 159/71 (2021 12:00) (60/40 - 159/71)  BP(mean): 97 (2021 12:00) (80 - 105)  ABP: --  ABP(mean): --  RR: 18 (2021 12:00) (18 - 27)  SpO2: 98% (2021 12:00) (97% - 100%)    I&O's Summary    2021 07:01  -  2021 07:00  --------------------------------------------------------  IN: 447 mL / OUT: 450 mL / NET: -3 mL    2021 07:01  -  2021 12:19  --------------------------------------------------------  IN: 350.2 mL / OUT: 250 mL / NET: 100.2 mL      Mode: AC/ CMV (Assist Control/ Continuous Mandatory Ventilation)  RR (machine): 18  TV (machine): 500  FiO2: 50  PEEP: 5  ITime: 1  MAP: 12  PIP: 33      LABS:                        13.1   13.34 )-----------( 153      ( 2021 06:43 )             41.3     02-19    141  |  111<H>  |  40<H>  ----------------------------<  135<H>  4.3   |  24  |  1.51<H>    Ca    8.1<L>      2021 06:43  Phos  3.0       Mg     1.9         TPro  6.4  /  Alb  2.2<L>  /  TBili  0.8  /  DBili  x   /  AST  42<H>  /  ALT  42  /  AlkPhos  78      PT/INR - ( 2021 20:34 )   PT: 16.2 sec;   INR: 1.38 ratio         PTT - ( 2021 20:34 )  PTT:31.6 sec  Urinalysis Basic - ( 2021 23:07 )    Color: Yellow / Appearance: Clear / S.010 / pH: x  Gluc: x / Ketone: Negative  / Bili: Negative / Urobili: Negative   Blood: x / Protein: 30 mg/dL / Nitrite: Negative   Leuk Esterase: Small / RBC: 10-25 /HPF / WBC 6-10 /HPF   Sq Epi: x / Non Sq Epi: Occasional /HPF / Bacteria: Many /HPF      CAPILLARY BLOOD GLUCOSE      POCT Blood Glucose.: 107 mg/dL (2021 11:03)  POCT Blood Glucose.: 134 mg/dL (2021 04:54)  POCT Blood Glucose.: 153 mg/dL (2021 01:03)  POCT Blood Glucose.: 181 mg/dL (2021 20:08)    ABG - ( 2021 03:24 )  pH, Arterial: 7.36  pH, Blood: x     /  pCO2: 40    /  pO2: 107   / HCO3: 22    / Base Excess: -2.6  /  SaO2: 98                  RADIOLOGY & ADDITIONAL TESTS:    Consultant(s) Notes Reviewed:  [x ] YES  [ ] NO    MEDICATIONS  (STANDING):  aspirin  chewable 81 milliGRAM(s) Oral daily  atorvastatin 80 milliGRAM(s) Oral at bedtime  chlorhexidine 0.12% Liquid 15 milliLiter(s) Oral Mucosa every 12 hours  chlorhexidine 2% Cloths 1 Application(s) Topical <User Schedule>  dextrose 50% Injectable 25 Gram(s) IV Push once  insulin lispro (ADMELOG) corrective regimen sliding scale   SubCutaneous every 6 hours  lactated ringers. 1000 milliLiter(s) (75 mL/Hr) IV Continuous <Continuous>  meropenem  IVPB 1000 milliGRAM(s) IV Intermittent every 12 hours  norepinephrine Infusion 0.05 MICROgram(s)/kG/Min (5.75 mL/Hr) IV Continuous <Continuous>  pantoprazole  Injectable 40 milliGRAM(s) IV Push daily  propofol Infusion 5 MICROgram(s)/kG/Min (3.68 mL/Hr) IV Continuous <Continuous>  rivaroxaban 20 milliGRAM(s) Oral daily    MEDICATIONS  (PRN):  acetaminophen    Suspension .. 650 milliGRAM(s) Oral every 6 hours PRN Temp greater or equal to 38C (100.4F), Mild Pain (1 - 3)  fentaNYL    Injectable 25 MICROGram(s) IV Push every 4 hours PRN Severe Pain (7 - 10)      PHYSICAL EXAM:  GENERAL: well built, well nourished  HEAD:  Atraumatic, Normocephalic  EYES: EOMI, PERRLA, conjunctiva and sclera clear  ENT: No tonsillar erythema, exudates, or enlargement; Moist mucous membranes, Good dentition, No lesions  NECK: Supple, No JVD, Normal thyroid, no enlarged nodes  NERVOUS SYSTEM:  Alert & Oriented X3, Good concentration; Motor Strength 5/5 B/L upper and lower extremities; DTRs 2+ intact and symmetric, sensory intact  CHEST/LUNG: B/L good air entry; No rales, rhonchi, or wheezing  HEART: S1S2 normal, no S3, Regular rate and rhythm; No murmurs, rubs, or gallops  ABDOMEN: Soft, Nontender, Nondistended; Bowel sounds present  EXTREMITIES:  2+ Peripheral Pulses, No clubbing, cyanosis, or edema  LYMPH: No lymphadenopathy noted  SKIN: No rashes or lesions    Care Discussed with Consultants/Other Providers [ x] YES  [ ] NO Patient is a 75y old  Male who presents with a chief complaint of Unresponsive (2021 22:53)      INTERVAL HPI/OVERNIGHT EVENTS: patient examined bedside,started tubal feeds , will f/u cultures and echo   ICU Vital Signs Last 24 Hrs  T(C): 37.8 (2021 08:00), Max: 39.4 (2021 20:16)  T(F): 100 (2021 08:00), Max: 103 (2021 20:16)  HR: 77 (2021 12:00) (74 - 114)  BP: 159/71 (2021 12:00) (60/40 - 159/71)  BP(mean): 97 (2021 12:00) (80 - 105)  ABP: --  ABP(mean): --  RR: 18 (2021 12:00) (18 - 27)  SpO2: 98% (2021 12:00) (97% - 100%)    I&O's Summary    2021 07:01  -  2021 07:00  --------------------------------------------------------  IN: 447 mL / OUT: 450 mL / NET: -3 mL    2021 07:01  -  2021 12:19  --------------------------------------------------------  IN: 350.2 mL / OUT: 250 mL / NET: 100.2 mL      Mode: AC/ CMV (Assist Control/ Continuous Mandatory Ventilation)  RR (machine): 18  TV (machine): 500  FiO2: 50  PEEP: 5  ITime: 1  MAP: 12  PIP: 33      LABS:                        13.1   13.34 )-----------( 153      ( 2021 06:43 )             41.3     02-    141  |  111<H>  |  40<H>  ----------------------------<  135<H>  4.3   |  24  |  1.51<H>    Ca    8.1<L>      2021 06:43  Phos  3.0       Mg     1.9         TPro  6.4  /  Alb  2.2<L>  /  TBili  0.8  /  DBili  x   /  AST  42<H>  /  ALT  42  /  AlkPhos  78      PT/INR - ( 2021 20:34 )   PT: 16.2 sec;   INR: 1.38 ratio         PTT - ( 2021 20:34 )  PTT:31.6 sec  Urinalysis Basic - ( 2021 23:07 )    Color: Yellow / Appearance: Clear / S.010 / pH: x  Gluc: x / Ketone: Negative  / Bili: Negative / Urobili: Negative   Blood: x / Protein: 30 mg/dL / Nitrite: Negative   Leuk Esterase: Small / RBC: 10-25 /HPF / WBC 6-10 /HPF   Sq Epi: x / Non Sq Epi: Occasional /HPF / Bacteria: Many /HPF      CAPILLARY BLOOD GLUCOSE      POCT Blood Glucose.: 107 mg/dL (2021 11:03)  POCT Blood Glucose.: 134 mg/dL (2021 04:54)  POCT Blood Glucose.: 153 mg/dL (2021 01:03)  POCT Blood Glucose.: 181 mg/dL (2021 20:08)    ABG - ( 2021 03:24 )  pH, Arterial: 7.36  pH, Blood: x     /  pCO2: 40    /  pO2: 107   / HCO3: 22    / Base Excess: -2.6  /  SaO2: 98                  RADIOLOGY & ADDITIONAL TESTS:    Consultant(s) Notes Reviewed:  [x ] YES  [ ] NO    MEDICATIONS  (STANDING):  aspirin  chewable 81 milliGRAM(s) Oral daily  atorvastatin 80 milliGRAM(s) Oral at bedtime  chlorhexidine 0.12% Liquid 15 milliLiter(s) Oral Mucosa every 12 hours  chlorhexidine 2% Cloths 1 Application(s) Topical <User Schedule>  dextrose 50% Injectable 25 Gram(s) IV Push once  insulin lispro (ADMELOG) corrective regimen sliding scale   SubCutaneous every 6 hours  lactated ringers. 1000 milliLiter(s) (75 mL/Hr) IV Continuous <Continuous>  meropenem  IVPB 1000 milliGRAM(s) IV Intermittent every 12 hours  norepinephrine Infusion 0.05 MICROgram(s)/kG/Min (5.75 mL/Hr) IV Continuous <Continuous>  pantoprazole  Injectable 40 milliGRAM(s) IV Push daily  propofol Infusion 5 MICROgram(s)/kG/Min (3.68 mL/Hr) IV Continuous <Continuous>  rivaroxaban 20 milliGRAM(s) Oral daily    MEDICATIONS  (PRN):  acetaminophen    Suspension .. 650 milliGRAM(s) Oral every 6 hours PRN Temp greater or equal to 38C (100.4F), Mild Pain (1 - 3)  fentaNYL    Injectable 25 MICROGram(s) IV Push every 4 hours PRN Severe Pain (7 - 10)      PHYSICAL EXAM:  GENERAL: well built, +ETT  HEAD:  Atraumatic, Normocephalic  EYES: EOMI, PERRLA, conjunctiva and sclera clear  ENT: No tonsillar erythema, exudates, or enlargement;   NECK: Supple, No JVD, Normal thyroid, no enlarged nodes  NERVOUS SYSTEM:  sedated.  CHEST/LUNG: B/L good air entry; No rales, rhonchi, or wheezing  HEART: S1S2 normal, no S3, Regular rate and rhythm; No murmurs, rubs, or gallops  ABDOMEN: Soft, Nontender, + distended; Bowel sounds present  EXTREMITIES:  2+ Peripheral Pulses, swelling in bilateral LE s and scrotum   LYMPH: No lymphadenopathy noted  SKIN: No rashes or lesions    Care Discussed with Consultants/Other Providers [ x] YES  [ ] NO Patient is a 75y old  Male who presents with a chief complaint of Unresponsive (2021 22:53)      INTERVAL HPI/OVERNIGHT EVENTS: patient examined bedside,started tubal feeds , will f/u cultures and echo   update : blood cultures pos to gram pos cocci , started vanco and consulted Dr Becerra   ICU Vital Signs Last 24 Hrs  T(C): 37.8 (2021 08:00), Max: 39.4 (2021 20:16)  T(F): 100 (2021 08:00), Max: 103 (2021 20:16)  HR: 77 (2021 12:00) (74 - 114)  BP: 159/71 (2021 12:00) (60/40 - 159/71)  BP(mean): 97 (2021 12:00) (80 - 105)  ABP: --  ABP(mean): --  RR: 18 (2021 12:00) (18 - 27)  SpO2: 98% (2021 12:00) (97% - 100%)    I&O's Summary    2021 07:01  -  2021 07:00  --------------------------------------------------------  IN: 447 mL / OUT: 450 mL / NET: -3 mL    2021 07:01  -  2021 12:19  --------------------------------------------------------  IN: 350.2 mL / OUT: 250 mL / NET: 100.2 mL      Mode: AC/ CMV (Assist Control/ Continuous Mandatory Ventilation)  RR (machine): 18  TV (machine): 500  FiO2: 50  PEEP: 5  ITime: 1  MAP: 12  PIP: 33      LABS:                        13.1   13.34 )-----------( 153      ( 2021 06:43 )             41.3         141  |  111<H>  |  40<H>  ----------------------------<  135<H>  4.3   |  24  |  1.51<H>    Ca    8.1<L>      2021 06:43  Phos  3.0       Mg     1.9         TPro  6.4  /  Alb  2.2<L>  /  TBili  0.8  /  DBili  x   /  AST  42<H>  /  ALT  42  /  AlkPhos  78      PT/INR - ( 2021 20:34 )   PT: 16.2 sec;   INR: 1.38 ratio         PTT - ( 2021 20:34 )  PTT:31.6 sec  Urinalysis Basic - ( 2021 23:07 )    Color: Yellow / Appearance: Clear / S.010 / pH: x  Gluc: x / Ketone: Negative  / Bili: Negative / Urobili: Negative   Blood: x / Protein: 30 mg/dL / Nitrite: Negative   Leuk Esterase: Small / RBC: 10-25 /HPF / WBC 6-10 /HPF   Sq Epi: x / Non Sq Epi: Occasional /HPF / Bacteria: Many /HPF      CAPILLARY BLOOD GLUCOSE      POCT Blood Glucose.: 107 mg/dL (2021 11:03)  POCT Blood Glucose.: 134 mg/dL (2021 04:54)  POCT Blood Glucose.: 153 mg/dL (2021 01:03)  POCT Blood Glucose.: 181 mg/dL (2021 20:08)    ABG - ( 2021 03:24 )  pH, Arterial: 7.36  pH, Blood: x     /  pCO2: 40    /  pO2: 107   / HCO3: 22    / Base Excess: -2.6  /  SaO2: 98                  RADIOLOGY & ADDITIONAL TESTS:    Consultant(s) Notes Reviewed:  [x ] YES  [ ] NO    MEDICATIONS  (STANDING):  aspirin  chewable 81 milliGRAM(s) Oral daily  atorvastatin 80 milliGRAM(s) Oral at bedtime  chlorhexidine 0.12% Liquid 15 milliLiter(s) Oral Mucosa every 12 hours  chlorhexidine 2% Cloths 1 Application(s) Topical <User Schedule>  dextrose 50% Injectable 25 Gram(s) IV Push once  insulin lispro (ADMELOG) corrective regimen sliding scale   SubCutaneous every 6 hours  lactated ringers. 1000 milliLiter(s) (75 mL/Hr) IV Continuous <Continuous>  meropenem  IVPB 1000 milliGRAM(s) IV Intermittent every 12 hours  norepinephrine Infusion 0.05 MICROgram(s)/kG/Min (5.75 mL/Hr) IV Continuous <Continuous>  pantoprazole  Injectable 40 milliGRAM(s) IV Push daily  propofol Infusion 5 MICROgram(s)/kG/Min (3.68 mL/Hr) IV Continuous <Continuous>  rivaroxaban 20 milliGRAM(s) Oral daily    MEDICATIONS  (PRN):  acetaminophen    Suspension .. 650 milliGRAM(s) Oral every 6 hours PRN Temp greater or equal to 38C (100.4F), Mild Pain (1 - 3)  fentaNYL    Injectable 25 MICROGram(s) IV Push every 4 hours PRN Severe Pain (7 - 10)      PHYSICAL EXAM:  GENERAL: well built, +ETT  HEAD:  Atraumatic, Normocephalic  EYES: EOMI, PERRLA, conjunctiva and sclera clear  ENT: No tonsillar erythema, exudates, or enlargement;   NECK: Supple, No JVD, Normal thyroid, no enlarged nodes  NERVOUS SYSTEM:  sedated.  CHEST/LUNG: B/L good air entry; No rales, rhonchi, or wheezing  HEART: S1S2 normal, no S3, Regular rate and rhythm; No murmurs, rubs, or gallops  ABDOMEN: Soft, Nontender, + distended; Bowel sounds present  EXTREMITIES:  2+ Peripheral Pulses, swelling in bilateral LE s and scrotum   LYMPH: No lymphadenopathy noted  SKIN: No rashes or lesions    Care Discussed with Consultants/Other Providers [ x] YES  [ ] NO

## 2021-02-19 NOTE — CHART NOTE - NSCHARTNOTEFT_GEN_A_CORE
PCP Dr Gregorio called , reported patient's wife cannot fully understand what is going on with the patient , asked the PCP to call for update about patient's condition. All questions and concerns addressed. PCP Dr Gregorio called , reported patient's wife cannot fully understand what is going on with the patient. patient's wife requested the PCP to call for an update about patient's condition. Current condition and plan of care discussed with PCP. All questions and concerns addressed.

## 2021-02-19 NOTE — PROGRESS NOTE ADULT - ASSESSMENT
75 Y M from [facility] rehab, non ambulatory with PMhx of CVA( twice first in July 2020, second 3 weeks ago), HTN, HLD, Prediabetes,BPH was brought in from NH for unresponsiveness.   ED course: Pt was unresponsive so was intubated in The ED and Femoral line was placed by the ED physician   Vitals : Febrile to 103 F , , /108  Labs : wbc 6k ,lactate 1.2, Bun/CR 40/1.56  T1 0.018  ECG : NSR   CXR : clear    Pt will be admitted to ICU for Acute hypoxic Respiratory Failure and Septic shock         Assessment:  1. Acute Hypoxic Respiratory Failure     2. Acute Encephalopathy   3. Septic shock   4. CVA   5. pre-diabetes   6. HTN   7. BPH     Plan:    =====================[CNS ]==============================  # Acute Encephalopathy :    - recent stroke 3 weeks ago , alert ,oriented and follows commands as per NH papers at baseline, confused and memory loss after stroke as per wife   -  CTH no acute event ,Age-indeterminate left PCA territory infarction.     # CVA :   - L post cerebral A stroke as per NH papers   - Pt is on Aspirin, xarelto and statins   -  c/w all if no bleeding on CTH     =====================[CVS ]==============================  # Septic shock :   - likely 2/2 Urosepsis   - UA +ve   - c/w vasopressor support   -  on Meropenem 1g q12 for now , can de-escalate pending culture results   - f/u BCx ,UCx     # HTN :   - pt is on toprol, losartan and nifedipine   - Hold BP medications   - c/w vasopressors for BP support   - trend trops   - f/u Echo     =====================[RESP ]==============================  # Acute Hypoxic Resp Failure :   - Pt was intubated as pt was unresponsive   - CXR Clear   - CT chest showed Right middle lobe as well as patchy bibasilar opacities.    - c/w Mech vent     =====================[ GI ]================================  # No issues :   - NGT in place   -TF     ====================[ RENAL ]=============================   # URSULA over CKD :   - baseline Cr around 1.4   - Bun/cr 40/1.56   - FC in place   - on  gentle hydration w/LR @ 75 cc x 12hrs  - monitor urine output   - I&Os  - Monitor electrolytes     =====================[ ID ]================================  # Urosepsis :   -  febrile to 103 F, no wbc count or lactate , CXR clear   - UA +ve   - f/u BCx and UCx, PCt    - s/p 1 dose of vanco and zosyn in the ED   - will c/w empirical ABx     ===================[ HEME/ONC ]===========================  # No issues :  - Hb and Plt stable   - monitor cbc daily     =====================[ ENDO ]=============================  # Pre -diabetes : -  - Last A1c 6.2   - target CBG < 180  - FS q6 while NPO, will start on ISS   - Start diet when possible    ==================[ SKIN/ CATHETERS ]======================  # no wound or skin break   # R fem line     ==================[ PROPHYLAXIS ]==========================   # Dvt : Xarelto  # Gi : Protonix     ====================[ DISPO ]==============================   - Monitor in ICU     ===================[ PROGNOSIS ]===========================  - Guarded

## 2021-02-19 NOTE — PROGRESS NOTE ADULT - ATTENDING COMMENTS
Patient admitted to the hospital with septic shock secondary to urinary source of infection. Started on broad spectrum antibiotics. He is currently intubated and sedated. Will monitor daily for possibly extubation if encephalopathy improves and patient has clinical improvement. Cont. vasopressor support. F/u culture results. Start tube feedings. Repeat labs. Monitor urine output and renal function. Prognosis is guarded.

## 2021-02-20 LAB
-  COAGULASE NEGATIVE STAPHYLOCOCCUS, METHICILLIN RESISTANT: SIGNIFICANT CHANGE UP
ALBUMIN SERPL ELPH-MCNC: 2 G/DL — LOW (ref 3.5–5)
ALP SERPL-CCNC: 78 U/L — SIGNIFICANT CHANGE UP (ref 40–120)
ALT FLD-CCNC: 33 U/L DA — SIGNIFICANT CHANGE UP (ref 10–60)
ANION GAP SERPL CALC-SCNC: 8 MMOL/L — SIGNIFICANT CHANGE UP (ref 5–17)
AST SERPL-CCNC: 29 U/L — SIGNIFICANT CHANGE UP (ref 10–40)
BILIRUB SERPL-MCNC: 0.5 MG/DL — SIGNIFICANT CHANGE UP (ref 0.2–1.2)
BUN SERPL-MCNC: 25 MG/DL — HIGH (ref 7–18)
CALCIUM SERPL-MCNC: 8.7 MG/DL — SIGNIFICANT CHANGE UP (ref 8.4–10.5)
CHLORIDE SERPL-SCNC: 109 MMOL/L — HIGH (ref 96–108)
CO2 SERPL-SCNC: 25 MMOL/L — SIGNIFICANT CHANGE UP (ref 22–31)
CREAT SERPL-MCNC: 1.03 MG/DL — SIGNIFICANT CHANGE UP (ref 0.5–1.3)
CULTURE RESULTS: SIGNIFICANT CHANGE UP
GLUCOSE BLDC GLUCOMTR-MCNC: 100 MG/DL — HIGH (ref 70–99)
GLUCOSE BLDC GLUCOMTR-MCNC: 80 MG/DL — SIGNIFICANT CHANGE UP (ref 70–99)
GLUCOSE SERPL-MCNC: 92 MG/DL — SIGNIFICANT CHANGE UP (ref 70–99)
GRAM STN FLD: SIGNIFICANT CHANGE UP
HCT VFR BLD CALC: 39.3 % — SIGNIFICANT CHANGE UP (ref 39–50)
HGB BLD-MCNC: 12.8 G/DL — LOW (ref 13–17)
MAGNESIUM SERPL-MCNC: 1.9 MG/DL — SIGNIFICANT CHANGE UP (ref 1.6–2.6)
MCHC RBC-ENTMCNC: 32 PG — SIGNIFICANT CHANGE UP (ref 27–34)
MCHC RBC-ENTMCNC: 32.6 GM/DL — SIGNIFICANT CHANGE UP (ref 32–36)
MCV RBC AUTO: 98.3 FL — SIGNIFICANT CHANGE UP (ref 80–100)
METHOD TYPE: SIGNIFICANT CHANGE UP
NRBC # BLD: 0 /100 WBCS — SIGNIFICANT CHANGE UP (ref 0–0)
ORGANISM # SPEC MICROSCOPIC CNT: SIGNIFICANT CHANGE UP
ORGANISM # SPEC MICROSCOPIC CNT: SIGNIFICANT CHANGE UP
PHOSPHATE SERPL-MCNC: 2.7 MG/DL — SIGNIFICANT CHANGE UP (ref 2.5–4.5)
PLATELET # BLD AUTO: 137 K/UL — LOW (ref 150–400)
POTASSIUM SERPL-MCNC: 4.2 MMOL/L — SIGNIFICANT CHANGE UP (ref 3.5–5.3)
POTASSIUM SERPL-SCNC: 4.2 MMOL/L — SIGNIFICANT CHANGE UP (ref 3.5–5.3)
PROT SERPL-MCNC: 6.1 G/DL — SIGNIFICANT CHANGE UP (ref 6–8.3)
RBC # BLD: 4 M/UL — LOW (ref 4.2–5.8)
RBC # FLD: 11.9 % — SIGNIFICANT CHANGE UP (ref 10.3–14.5)
SARS-COV-2 RNA SPEC QL NAA+PROBE: SIGNIFICANT CHANGE UP
SODIUM SERPL-SCNC: 142 MMOL/L — SIGNIFICANT CHANGE UP (ref 135–145)
SPECIMEN SOURCE: SIGNIFICANT CHANGE UP
SPECIMEN SOURCE: SIGNIFICANT CHANGE UP
TRIGL SERPL-MCNC: 160 MG/DL — HIGH
WBC # BLD: 9.74 K/UL — SIGNIFICANT CHANGE UP (ref 3.8–10.5)
WBC # FLD AUTO: 9.74 K/UL — SIGNIFICANT CHANGE UP (ref 3.8–10.5)

## 2021-02-20 PROCEDURE — 71045 X-RAY EXAM CHEST 1 VIEW: CPT | Mod: 26

## 2021-02-20 RX ORDER — HYDROMORPHONE HYDROCHLORIDE 2 MG/ML
0.5 INJECTION INTRAMUSCULAR; INTRAVENOUS; SUBCUTANEOUS
Qty: 100 | Refills: 0 | Status: DISCONTINUED | OUTPATIENT
Start: 2021-02-20 | End: 2021-02-22

## 2021-02-20 RX ORDER — ENOXAPARIN SODIUM 100 MG/ML
120 INJECTION SUBCUTANEOUS
Refills: 0 | Status: DISCONTINUED | OUTPATIENT
Start: 2021-02-21 | End: 2021-03-08

## 2021-02-20 RX ORDER — HYDROMORPHONE HYDROCHLORIDE 2 MG/ML
0.5 INJECTION INTRAMUSCULAR; INTRAVENOUS; SUBCUTANEOUS
Qty: 10 | Refills: 0 | Status: DISCONTINUED | OUTPATIENT
Start: 2021-02-20 | End: 2021-02-20

## 2021-02-20 RX ADMIN — HYDROMORPHONE HYDROCHLORIDE 0.5 MG/HR: 2 INJECTION INTRAMUSCULAR; INTRAVENOUS; SUBCUTANEOUS at 14:51

## 2021-02-20 RX ADMIN — CHLORHEXIDINE GLUCONATE 1 APPLICATION(S): 213 SOLUTION TOPICAL at 07:06

## 2021-02-20 RX ADMIN — ATORVASTATIN CALCIUM 80 MILLIGRAM(S): 80 TABLET, FILM COATED ORAL at 22:15

## 2021-02-20 RX ADMIN — RIVAROXABAN 15 MILLIGRAM(S): KIT at 17:30

## 2021-02-20 RX ADMIN — MEROPENEM 100 MILLIGRAM(S): 1 INJECTION INTRAVENOUS at 07:05

## 2021-02-20 RX ADMIN — PROPOFOL 3.68 MICROGRAM(S)/KG/MIN: 10 INJECTION, EMULSION INTRAVENOUS at 07:25

## 2021-02-20 RX ADMIN — Medication 650 MILLIGRAM(S): at 07:25

## 2021-02-20 RX ADMIN — PANTOPRAZOLE SODIUM 40 MILLIGRAM(S): 20 TABLET, DELAYED RELEASE ORAL at 12:04

## 2021-02-20 RX ADMIN — PROPOFOL 3.68 MICROGRAM(S)/KG/MIN: 10 INJECTION, EMULSION INTRAVENOUS at 02:16

## 2021-02-20 RX ADMIN — Medication 81 MILLIGRAM(S): at 12:05

## 2021-02-20 RX ADMIN — CHLORHEXIDINE GLUCONATE 15 MILLILITER(S): 213 SOLUTION TOPICAL at 07:06

## 2021-02-20 RX ADMIN — MEROPENEM 100 MILLIGRAM(S): 1 INJECTION INTRAVENOUS at 18:00

## 2021-02-20 RX ADMIN — Medication 250 MILLIGRAM(S): at 16:05

## 2021-02-20 RX ADMIN — CHLORHEXIDINE GLUCONATE 15 MILLILITER(S): 213 SOLUTION TOPICAL at 18:10

## 2021-02-20 RX ADMIN — PROPOFOL 3.68 MICROGRAM(S)/KG/MIN: 10 INJECTION, EMULSION INTRAVENOUS at 14:24

## 2021-02-20 RX ADMIN — PROPOFOL 3.68 MICROGRAM(S)/KG/MIN: 10 INJECTION, EMULSION INTRAVENOUS at 22:15

## 2021-02-20 NOTE — PROGRESS NOTE ADULT - ATTENDING COMMENTS
IMP: This is a 75 yr old man  from [facility] rehab, non ambulatory with  CVA( twice first in July 2020, second 3 weeks ago), HTN, HLD, Prediabetes,BPH was brought in from NH for unresponsiveness.   ED course: Pt was unresponsive so was intubated in The ED and Femoral line was placed by the ED physician   Vitals : Febrile to 103 F , , /108  Labs : wbc 6k ,lactate 1.2, Bun/CR 40/1.56  T1 0.018  ECG : NSR   CXR : clear    Pt will be admitted to ICU for Acute hypoxic Respiratory Failure and Septic shock . Intubated in ER on admission         Assessment:  1. Acute Hypoxic Respiratory Failure     2. Acute Encephalopathy   3. Septic shock   4. CVA   5. pre-diabetes   6. HTN   7. BPH     Plan;  -continue vent support  -adjust vent as per ABG  -sedated and pain control   -hemodynamic support  -monitor blood sugar   -pressors to maintain MAP>65  -monitor biomarkers daily  -change to lovenox therapeutic

## 2021-02-20 NOTE — PROGRESS NOTE ADULT - SUBJECTIVE AND OBJECTIVE BOX
INTERVAL HPI/OVERNIGHT EVENTS:   No acute event over night     PRESSORS: [ ] YES [ ] NO  WHICH:    ANTIBIOTICS:                  DATE STARTED:  ANTIBIOTICS:                  DATE STARTED:  ANTIBIOTICS:                  DATE STARTED:    Antimicrobial:  meropenem  IVPB 1000 milliGRAM(s) IV Intermittent every 12 hours  vancomycin  IVPB      vancomycin  IVPB 1000 milliGRAM(s) IV Intermittent every 24 hours    Cardiovascular:  norepinephrine Infusion 0.05 MICROgram(s)/kG/Min IV Continuous <Continuous>    Pulmonary:    Hematalogic:  aspirin  chewable 81 milliGRAM(s) Oral daily  rivaroxaban 15 milliGRAM(s) Oral with dinner    Other:  acetaminophen    Suspension .. 650 milliGRAM(s) Oral every 6 hours PRN  atorvastatin 80 milliGRAM(s) Oral at bedtime  chlorhexidine 0.12% Liquid 15 milliLiter(s) Oral Mucosa every 12 hours  chlorhexidine 2% Cloths 1 Application(s) Topical <User Schedule>  dextrose 50% Injectable 25 Gram(s) IV Push once  fentaNYL    Injectable 25 MICROGram(s) IV Push every 4 hours PRN  insulin lispro (ADMELOG) corrective regimen sliding scale   SubCutaneous every 6 hours  lactated ringers. 1000 milliLiter(s) IV Continuous <Continuous>  pantoprazole  Injectable 40 milliGRAM(s) IV Push daily  propofol Infusion 5 MICROgram(s)/kG/Min IV Continuous <Continuous>    acetaminophen    Suspension .. 650 milliGRAM(s) Oral every 6 hours PRN  aspirin  chewable 81 milliGRAM(s) Oral daily  atorvastatin 80 milliGRAM(s) Oral at bedtime  chlorhexidine 0.12% Liquid 15 milliLiter(s) Oral Mucosa every 12 hours  chlorhexidine 2% Cloths 1 Application(s) Topical <User Schedule>  dextrose 50% Injectable 25 Gram(s) IV Push once  fentaNYL    Injectable 25 MICROGram(s) IV Push every 4 hours PRN  insulin lispro (ADMELOG) corrective regimen sliding scale   SubCutaneous every 6 hours  lactated ringers. 1000 milliLiter(s) IV Continuous <Continuous>  meropenem  IVPB 1000 milliGRAM(s) IV Intermittent every 12 hours  norepinephrine Infusion 0.05 MICROgram(s)/kG/Min IV Continuous <Continuous>  pantoprazole  Injectable 40 milliGRAM(s) IV Push daily  propofol Infusion 5 MICROgram(s)/kG/Min IV Continuous <Continuous>  rivaroxaban 15 milliGRAM(s) Oral with dinner  vancomycin  IVPB      vancomycin  IVPB 1000 milliGRAM(s) IV Intermittent every 24 hours    Drug Dosing Weight  Height (cm): 170.2 (2021 08:00)  Weight (kg): 120.6 (2021 02:00)  BMI (kg/m2): 41.6 (2021 08:00)  BSA (m2): 2.28 (2021 08:00)    CENTRAL LINE: [ ] YES [ ] NO  LOCATION:         TRUJILLO: [ ] YES [ ] NO          A-LINE:  [ ] YES [ ] NO  LOCATION:             ICU Vital Signs Last 24 Hrs  T(C): 36.6 (2021 16:01), Max: 37.8 (2021 04:00)  T(F): 97.9 (2021 16:), Max: 100.1 (2021 04:00)  HR: 87 (:00) (71 - 92)  BP: 103/68 (:00) (99/75 - 159/71)  BP(mean): 80 (2021 01:) (80 - 110)  ABP: --  ABP(mean): --  RR: 20 (2021 01:00) (18 - 20)  SpO2: 98% (2021 01:00) (93% - 100%)      ABG - ( 2021 03:24 )  pH, Arterial: 7.36  pH, Blood: x     /  pCO2: 40    /  pO2: 107   / HCO3: 22    / Base Excess: -2.6  /  SaO2: 98                    18 @ 07:01  -  02-19 @ 07:00  --------------------------------------------------------  IN: 447 mL / OUT: 450 mL / NET: -3 mL        Mode: AC/ CMV (Assist Control/ Continuous Mandatory Ventilation)  RR (machine): 18  TV (machine): 450  FiO2: 40  PEEP: 5  ITime: 1  MAP: 11  PIP: 29      REVIEW OF SYSTEMS:      PHYSICAL EXAM:  GENERAL: well built, +ETT  HEAD:  Atraumatic, Normocephalic  EYES: EOMI, PERRLA, conjunctiva and sclera clear  ENT: No tonsillar erythema, exudates, or enlargement;   NECK: Supple, No JVD, Normal thyroid, no enlarged nodes  NERVOUS SYSTEM:  sedated.  CHEST/LUNG: B/L good air entry; No rales, rhonchi, or wheezing  HEART: S1S2 normal, no S3, Regular rate and rhythm; No murmurs, rubs, or gallops  ABDOMEN: Soft, Nontender, + distended; Bowel sounds present  EXTREMITIES:  2+ Peripheral Pulses, swelling in bilateral LE s and scrotum   LYMPH: No lymphadenopathy noted  SKIN: No rashes or lesions      LABS:  CBC Full  -  ( 2021 06:43 )  WBC Count : 13.34 K/uL  RBC Count : 4.17 M/uL  Hemoglobin : 13.1 g/dL  Hematocrit : 41.3 %  Platelet Count - Automated : 153 K/uL  Mean Cell Volume : 99.0 fl  Mean Cell Hemoglobin : 31.4 pg  Mean Cell Hemoglobin Concentration : 31.7 gm/dL  Auto Neutrophil # : 10.96 K/uL  Auto Lymphocyte # : 1.63 K/uL  Auto Monocyte # : 0.54 K/uL  Auto Eosinophil # : 0.10 K/uL  Auto Basophil # : 0.02 K/uL  Auto Neutrophil % : 82.3 %  Auto Lymphocyte % : 12.2 %  Auto Monocyte % : 4.0 %  Auto Eosinophil % : 0.7 %  Auto Basophil % : 0.1 %        145  |  114<H>  |  32<H>  ----------------------------<  109<H>  4.5   |  27  |  1.21    Ca    8.5      2021 15:20  Phos  3.0     -  Mg     1.9         TPro  6.3  /  Alb  2.1<L>  /  TBili  0.5  /  DBili  x   /  AST  34  /  ALT  37  /  AlkPhos  79  -    PT/INR - ( 2021 20:34 )   PT: 16.2 sec;   INR: 1.38 ratio         PTT - ( 2021 20:34 )  PTT:31.6 sec  Urinalysis Basic - ( 2021 23:07 )    Color: Yellow / Appearance: Clear / S.010 / pH: x  Gluc: x / Ketone: Negative  / Bili: Negative / Urobili: Negative   Blood: x / Protein: 30 mg/dL / Nitrite: Negative   Leuk Esterase: Small / RBC: 10-25 /HPF / WBC 6-10 /HPF   Sq Epi: x / Non Sq Epi: Occasional /HPF / Bacteria: Many /HPF      Culture Results:   Growth in anaerobic bottle: Gram Positive Cocci in Pairs and Chains  ***Blood Panel PCR results on this specimen are available  approximately 3 hours after the Gram stain result.***  Gram stain, PCR, and/or culture results may not always  correspond due to difference in methodologies.  ************************************************************  This PCR assay was performed by multiplex PCR. This  Assay tests for 66 bacterial and resistance gene targets.  Please refer to the [facility] test directory  at [email or web address] for details. ( @ 03:00)  Culture Results:   Growth in aerobic bottle: Gram Positive Cocci in Clusters  ***Blood Panel PCR results on this specimen are available  approximately 3 hours after the Gram stain result.***  Gram stain, PCR, and/or culture results may not always  correspond due to difference in methodologies.  ************************************************************  This PCR assay was performed by multiplex PCR. This  Assay tests for 66 bacterial and resistance gene targets.  Please refer to the [facility] test directory  at [email or web address] for details. ( @ 03:00)      RADIOLOGY & ADDITIONAL STUDIES REVIEWED:  ***    [ ]GOALS OF CARE DISCUSSION WITH PATIENT/FAMILY/PROXY:    CRITICAL CARE TIME SPENT: 35 minutes INTERVAL HPI/OVERNIGHT EVENTS:   No acute event overnight. Started on dilaudid drip today.    PRESSORS: NO  WHICH:    ANTIBIOTICS:                  DATE STARTED:  ANTIBIOTICS:                  DATE STARTED:  ANTIBIOTICS:                  DATE STARTED:    Antimicrobial:  meropenem  IVPB 1000 milliGRAM(s) IV Intermittent every 12 hours  vancomycin  IVPB      vancomycin  IVPB 1000 milliGRAM(s) IV Intermittent every 24 hours    Cardiovascular:  norepinephrine Infusion 0.05 MICROgram(s)/kG/Min IV Continuous <Continuous>    Pulmonary:    Hematalogic:  aspirin  chewable 81 milliGRAM(s) Oral daily  rivaroxaban 15 milliGRAM(s) Oral with dinner    Other:  acetaminophen    Suspension .. 650 milliGRAM(s) Oral every 6 hours PRN  atorvastatin 80 milliGRAM(s) Oral at bedtime  chlorhexidine 0.12% Liquid 15 milliLiter(s) Oral Mucosa every 12 hours  chlorhexidine 2% Cloths 1 Application(s) Topical <User Schedule>  dextrose 50% Injectable 25 Gram(s) IV Push once  fentaNYL    Injectable 25 MICROGram(s) IV Push every 4 hours PRN  insulin lispro (ADMELOG) corrective regimen sliding scale   SubCutaneous every 6 hours  lactated ringers. 1000 milliLiter(s) IV Continuous <Continuous>  pantoprazole  Injectable 40 milliGRAM(s) IV Push daily  propofol Infusion 5 MICROgram(s)/kG/Min IV Continuous <Continuous>    acetaminophen    Suspension .. 650 milliGRAM(s) Oral every 6 hours PRN  aspirin  chewable 81 milliGRAM(s) Oral daily  atorvastatin 80 milliGRAM(s) Oral at bedtime  chlorhexidine 0.12% Liquid 15 milliLiter(s) Oral Mucosa every 12 hours  chlorhexidine 2% Cloths 1 Application(s) Topical <User Schedule>  dextrose 50% Injectable 25 Gram(s) IV Push once  fentaNYL    Injectable 25 MICROGram(s) IV Push every 4 hours PRN  insulin lispro (ADMELOG) corrective regimen sliding scale   SubCutaneous every 6 hours  lactated ringers. 1000 milliLiter(s) IV Continuous <Continuous>  meropenem  IVPB 1000 milliGRAM(s) IV Intermittent every 12 hours  norepinephrine Infusion 0.05 MICROgram(s)/kG/Min IV Continuous <Continuous>  pantoprazole  Injectable 40 milliGRAM(s) IV Push daily  propofol Infusion 5 MICROgram(s)/kG/Min IV Continuous <Continuous>  rivaroxaban 15 milliGRAM(s) Oral with dinner  vancomycin  IVPB      vancomycin  IVPB 1000 milliGRAM(s) IV Intermittent every 24 hours    Drug Dosing Weight  Height (cm): 170.2 (2021 08:00)  Weight (kg): 120.6 (2021 02:00)  BMI (kg/m2): 41.6 (2021 08:00)  BSA (m2): 2.28 (2021 08:00)    CENTRAL LINE: [ ] YES [ ] NO  LOCATION:         TRUJILLO: [ ] YES [ ] NO          A-LINE:  [ ] YES [ ] NO  LOCATION:             ICU Vital Signs Last 24 Hrs  T(C): 36.6 (2021 16:01), Max: 37.8 (2021 04:00)  T(F): 97.9 (2021 16:01), Max: 100.1 (2021 04:00)  HR: 87 (:00) (71 - 92)  BP: 103/68 (:) (99/75 - 159/71)  BP(mean): 80 (2021 01:00) (80 - 110)  ABP: --  ABP(mean): --  RR: 20 (2021 01:00) (18 - 20)  SpO2: 98% (2021 01:00) (93% - 100%)      ABG - ( 2021 03:24 )  pH, Arterial: 7.36  pH, Blood: x     /  pCO2: 40    /  pO2: 107   / HCO3: 22    / Base Excess: -2.6  /  SaO2: 98                    18 @ 07:01  -  -19 @ 07:00  --------------------------------------------------------  IN: 447 mL / OUT: 450 mL / NET: -3 mL        Mode: AC/ CMV (Assist Control/ Continuous Mandatory Ventilation)  RR (machine): 18  TV (machine): 450  FiO2: 40  PEEP: 5  ITime: 1  MAP: 11  PIP: 29      REVIEW OF SYSTEMS:      PHYSICAL EXAM:  GENERAL: well built, +ETT  HEAD:  Atraumatic, Normocephalic  EYES: EOMI, PERRLA, conjunctiva and sclera clear  ENT: No tonsillar erythema, exudates, or enlargement;   NECK: Supple, No JVD, Normal thyroid, no enlarged nodes  NERVOUS SYSTEM:  sedated.  CHEST/LUNG: B/L good air entry; No rales, rhonchi, or wheezing  HEART: S1S2 normal, no S3, Regular rate and rhythm; No murmurs, rubs, or gallops  ABDOMEN: Soft, Nontender, + distended; Bowel sounds present  EXTREMITIES:  2+ Peripheral Pulses, swelling in bilateral LE s and scrotum   LYMPH: No lymphadenopathy noted  SKIN: No rashes or lesions      LABS:  CBC Full  -  ( 2021 06:43 )  WBC Count : 13.34 K/uL  RBC Count : 4.17 M/uL  Hemoglobin : 13.1 g/dL  Hematocrit : 41.3 %  Platelet Count - Automated : 153 K/uL  Mean Cell Volume : 99.0 fl  Mean Cell Hemoglobin : 31.4 pg  Mean Cell Hemoglobin Concentration : 31.7 gm/dL  Auto Neutrophil # : 10.96 K/uL  Auto Lymphocyte # : 1.63 K/uL  Auto Monocyte # : 0.54 K/uL  Auto Eosinophil # : 0.10 K/uL  Auto Basophil # : 0.02 K/uL  Auto Neutrophil % : 82.3 %  Auto Lymphocyte % : 12.2 %  Auto Monocyte % : 4.0 %  Auto Eosinophil % : 0.7 %  Auto Basophil % : 0.1 %        145  |  114<H>  |  32<H>  ----------------------------<  109<H>  4.5   |  27  |  1.21    Ca    8.5      2021 15:20  Phos  3.0       Mg     1.9         TPro  6.3  /  Alb  2.1<L>  /  TBili  0.5  /  DBili  x   /  AST  34  /  ALT  37  /  AlkPhos  79  -    PT/INR - ( 2021 20:34 )   PT: 16.2 sec;   INR: 1.38 ratio         PTT - ( 2021 20:34 )  PTT:31.6 sec  Urinalysis Basic - ( 2021 23:07 )    Color: Yellow / Appearance: Clear / S.010 / pH: x  Gluc: x / Ketone: Negative  / Bili: Negative / Urobili: Negative   Blood: x / Protein: 30 mg/dL / Nitrite: Negative   Leuk Esterase: Small / RBC: 10-25 /HPF / WBC 6-10 /HPF   Sq Epi: x / Non Sq Epi: Occasional /HPF / Bacteria: Many /HPF      Culture Results:   Growth in anaerobic bottle: Gram Positive Cocci in Pairs and Chains  ***Blood Panel PCR results on this specimen are available  approximately 3 hours after the Gram stain result.***  Gram stain, PCR, and/or culture results may not always  correspond due to difference in methodologies.  ************************************************************  This PCR assay was performed by multiplex PCR. This  Assay tests for 66 bacterial and resistance gene targets.  Please refer to the [employer] test directory  at [email or web address] for details. ( @ 03:00)  Culture Results:   Growth in aerobic bottle: Gram Positive Cocci in Clusters  ***Blood Panel PCR results on this specimen are available  approximately 3 hours after the Gram stain result.***  Gram stain, PCR, and/or culture results may not always  correspond due to difference in methodologies.  ************************************************************  This PCR assay was performed by multiplex PCR. This  Assay tests for 66 bacterial and resistance gene targets.  Please refer to the [employer] test directory  at [email or web address] for details. ( @ 03:00)      RADIOLOGY & ADDITIONAL STUDIES REVIEWED:  ***    [ ]GOALS OF CARE DISCUSSION WITH PATIENT/FAMILY/PROXY:    CRITICAL CARE TIME SPENT: 35 minutes INTERVAL HPI/OVERNIGHT EVENTS:   No acute event overnight. Started on dilaudid drip today.    PRESSORS: NO    Antimicrobial:  meropenem  IVPB 1000 milliGRAM(s) IV Intermittent every 12 hours  vancomycin  IVPB      vancomycin  IVPB 1000 milliGRAM(s) IV Intermittent every 24 hours    Cardiovascular:  norepinephrine Infusion 0.05 MICROgram(s)/kG/Min IV Continuous <Continuous>    Pulmonary:    Hematalogic:  aspirin  chewable 81 milliGRAM(s) Oral daily  rivaroxaban 15 milliGRAM(s) Oral with dinner    Other:  acetaminophen    Suspension .. 650 milliGRAM(s) Oral every 6 hours PRN  atorvastatin 80 milliGRAM(s) Oral at bedtime  chlorhexidine 0.12% Liquid 15 milliLiter(s) Oral Mucosa every 12 hours  chlorhexidine 2% Cloths 1 Application(s) Topical <User Schedule>  dextrose 50% Injectable 25 Gram(s) IV Push once  fentaNYL    Injectable 25 MICROGram(s) IV Push every 4 hours PRN  insulin lispro (ADMELOG) corrective regimen sliding scale   SubCutaneous every 6 hours  lactated ringers. 1000 milliLiter(s) IV Continuous <Continuous>  pantoprazole  Injectable 40 milliGRAM(s) IV Push daily  propofol Infusion 5 MICROgram(s)/kG/Min IV Continuous <Continuous>    acetaminophen    Suspension .. 650 milliGRAM(s) Oral every 6 hours PRN  aspirin  chewable 81 milliGRAM(s) Oral daily  atorvastatin 80 milliGRAM(s) Oral at bedtime  chlorhexidine 0.12% Liquid 15 milliLiter(s) Oral Mucosa every 12 hours  chlorhexidine 2% Cloths 1 Application(s) Topical <User Schedule>  dextrose 50% Injectable 25 Gram(s) IV Push once  fentaNYL    Injectable 25 MICROGram(s) IV Push every 4 hours PRN  insulin lispro (ADMELOG) corrective regimen sliding scale   SubCutaneous every 6 hours  lactated ringers. 1000 milliLiter(s) IV Continuous <Continuous>  meropenem  IVPB 1000 milliGRAM(s) IV Intermittent every 12 hours  norepinephrine Infusion 0.05 MICROgram(s)/kG/Min IV Continuous <Continuous>  pantoprazole  Injectable 40 milliGRAM(s) IV Push daily  propofol Infusion 5 MICROgram(s)/kG/Min IV Continuous <Continuous>  rivaroxaban 15 milliGRAM(s) Oral with dinner  vancomycin  IVPB      vancomycin  IVPB 1000 milliGRAM(s) IV Intermittent every 24 hours    Drug Dosing Weight  Height (cm): 170.2 (2021 08:00)  Weight (kg): 120.6 (2021 02:00)  BMI (kg/m2): 41.6 (2021 08:00)  BSA (m2): 2.28 (2021 08:00)    CENTRAL LINE:  Rf       TRUJILLO:  YES          A-LINE:  NO             ICU Vital Signs Last 24 Hrs  T(C): 36.6 (2021 16:01), Max: 37.8 (2021 04:00)  T(F): 97.9 (2021 16:01), Max: 100.1 (2021 04:00)  HR: 87 (:) (71 - 92)  BP: 103/68 (:) (99/75 - 159/71)  BP(mean): 80 (:) (80 - 110)  ABP: --  ABP(mean): --  RR: 20 (:00) (18 - 20)  SpO2: 98% (:00) (93% - 100%)      ABG - ( 2021 03:24 )  pH, Arterial: 7.36  pH, Blood: x     /  pCO2: 40    /  pO2: 107   / HCO3: 22    / Base Excess: -2.6  /  SaO2: 98                    02-18 @ 07:01  -  02-19 @ 07:00  --------------------------------------------------------  IN: 447 mL / OUT: 450 mL / NET: -3 mL        Mode: AC/ CMV (Assist Control/ Continuous Mandatory Ventilation)  RR (machine): 18  TV (machine): 450  FiO2: 40  PEEP: 5  ITime: 1  MAP: 11  PIP: 29      REVIEW OF SYSTEMS:      PHYSICAL EXAM:  GENERAL: well built, +ETT  HEAD:  Atraumatic, Normocephalic  EYES: PERRL  ENT: No tonsillar erythema, exudates, or enlargement;   NECK: Supple, No JVD, Normal thyroid, no enlarged nodes  NERVOUS SYSTEM:  sedated.  CHEST/LUNG: B/L good air entry; No rales, rhonchi, or wheezing  HEART: S1S2 normal, no S3, Regular rate and rhythm; No murmurs, rubs, or gallops  ABDOMEN: Soft, Nontender, + distended; Bowel sounds present  EXTREMITIES:  2+ Peripheral Pulses, swelling in bilateral LE s and scrotum   LYMPH: No lymphadenopathy noted  SKIN: No rashes or lesions      LABS:  CBC Full  -  ( 2021 06:43 )  WBC Count : 13.34 K/uL  RBC Count : 4.17 M/uL  Hemoglobin : 13.1 g/dL  Hematocrit : 41.3 %  Platelet Count - Automated : 153 K/uL  Mean Cell Volume : 99.0 fl  Mean Cell Hemoglobin : 31.4 pg  Mean Cell Hemoglobin Concentration : 31.7 gm/dL  Auto Neutrophil # : 10.96 K/uL  Auto Lymphocyte # : 1.63 K/uL  Auto Monocyte # : 0.54 K/uL  Auto Eosinophil # : 0.10 K/uL  Auto Basophil # : 0.02 K/uL  Auto Neutrophil % : 82.3 %  Auto Lymphocyte % : 12.2 %  Auto Monocyte % : 4.0 %  Auto Eosinophil % : 0.7 %  Auto Basophil % : 0.1 %        145  |  114<H>  |  32<H>  ----------------------------<  109<H>  4.5   |  27  |  1.21    Ca    8.5      2021 15:20  Phos  3.0       Mg     1.9         TPro  6.3  /  Alb  2.1<L>  /  TBili  0.5  /  DBili  x   /  AST  34  /  ALT  37  /  AlkPhos  79  -19    PT/INR - ( 2021 20:34 )   PT: 16.2 sec;   INR: 1.38 ratio         PTT - ( 2021 20:34 )  PTT:31.6 sec  Urinalysis Basic - ( 2021 23:07 )    Color: Yellow / Appearance: Clear / S.010 / pH: x  Gluc: x / Ketone: Negative  / Bili: Negative / Urobili: Negative   Blood: x / Protein: 30 mg/dL / Nitrite: Negative   Leuk Esterase: Small / RBC: 10-25 /HPF / WBC 6-10 /HPF   Sq Epi: x / Non Sq Epi: Occasional /HPF / Bacteria: Many /HPF      Culture Results:   Growth in anaerobic bottle: Gram Positive Cocci in Pairs and Chains  ***Blood Panel PCR results on this specimen are available  approximately 3 hours after the Gram stain result.***  Gram stain, PCR, and/or culture results may not always  correspond due to difference in methodologies.  ************************************************************  This PCR assay was performed by multiplex PCR. This  Assay tests for 66 bacterial and resistance gene targets.  Please refer to the [employer] test directory  at [email or web address] for details. ( @ 03:00)  Culture Results:   Growth in aerobic bottle: Gram Positive Cocci in Clusters  ***Blood Panel PCR results on this specimen are available  approximately 3 hours after the Gram stain result.***  Gram stain, PCR, and/or culture results may not always  correspond due to difference in methodologies.  ************************************************************  This PCR assay was performed by multiplex PCR. This  Assay tests for 66 bacterial and resistance gene targets.  Please refer to the [employer] test directory  at [email or web address] for details. ( @ 03:00)      RADIOLOGY & ADDITIONAL STUDIES REVIEWED:  ***    [ ]GOALS OF CARE DISCUSSION WITH PATIENT/FAMILY/PROXY:    CRITICAL CARE TIME SPENT: 35 minutes

## 2021-02-20 NOTE — CONSULT NOTE ADULT - GASTROINTESTINAL DETAILS
obese/soft/no distention/no masses palpable/bowel sounds normal/no guarding/no rigidity/no organomegaly

## 2021-02-20 NOTE — CONSULT NOTE ADULT - SUBJECTIVE AND OBJECTIVE BOX
HPI:  75 Y M from [facility] rehab, non ambulatory with PMhx of CVA( twice first in 2020, second 3 weeks ago), HTN, HLD, Prediabetes,BPH was brought in from NH for unresponsiveness. As per NH papers, pt was lethargic today and then become unresponsive so EMS was called. As per wife Polish speaking(  [id number]) Pt was confused and had memory loss after the stroke 3 weeks ago when he was admitted to [facility] and then discharged to Rehab on 21. Pt was walking with walker after the first stroke and not much ambulatory after the second stroke. No other history available.     ED course: Pt was unresponsive so was intubated in The ED and Femoral line was placed by the ED physician   Vitals : Febrile to 103 F , , /108  Labs : wbc 6k ,lactate 1.2, Bun/CR 40/1.56  T1 0.018  ECG : NSR   CXR : clear     SH : non smoker , drinks wine ocassionally , no drug use     GOC : FULL CODE  (2021 22:53)      PAST MEDICAL & SURGICAL HISTORY:  BPH (benign prostatic hyperplasia)    Hyperlipidemia    Hypertension    Diabetes    Stroke  2020 , 2021    S/P cholecystectomy        No Known Allergies      Meds:  acetaminophen    Suspension .. 650 milliGRAM(s) Oral every 6 hours PRN  aspirin  chewable 81 milliGRAM(s) Oral daily  atorvastatin 80 milliGRAM(s) Oral at bedtime  chlorhexidine 0.12% Liquid 15 milliLiter(s) Oral Mucosa every 12 hours  chlorhexidine 2% Cloths 1 Application(s) Topical <User Schedule>  dextrose 50% Injectable 25 Gram(s) IV Push once  HYDROmorphone Infusion 0.5 mG/Hr IV Continuous <Continuous>  insulin lispro (ADMELOG) corrective regimen sliding scale   SubCutaneous every 6 hours  lactated ringers. 1000 milliLiter(s) IV Continuous <Continuous>  meropenem  IVPB 1000 milliGRAM(s) IV Intermittent every 12 hours  norepinephrine Infusion 0.05 MICROgram(s)/kG/Min IV Continuous <Continuous>  pantoprazole  Injectable 40 milliGRAM(s) IV Push daily  propofol Infusion 5 MICROgram(s)/kG/Min IV Continuous <Continuous>  rivaroxaban 15 milliGRAM(s) Oral with dinner  vancomycin  IVPB      vancomycin  IVPB 1000 milliGRAM(s) IV Intermittent every 24 hours      SOCIAL HISTORY:  Smoker:  YES / NO        PACK YEARS:                         WHEN QUIT?  ETOH use:  YES / NO               FREQUENCY / QUANTITY:  Ilicit Drug use:  YES / NO  Occupation:  Assisted device use (Cane / Walker):  Live with:    FAMILY HISTORY:      VITALS:  Vital Signs Last 24 Hrs  T(C): 37.6 (2021 16:00), Max: 38.4 (2021 06:00)  T(F): 99.6 (2021 16:00), Max: 101.2 (2021 06:00)  HR: 90 (2021 16:12) (76 - 92)  BP: 137/76 (2021 16:00) (103/68 - 137/76)  BP(mean): 88 (2021 16:00) (77 - 110)  RR: 18 (2021 16:00) (18 - 22)  SpO2: 99% (2021 16:12) (92% - 100%)    LABS/DIAGNOSTIC TESTS:                          12.8   9.74  )-----------( 137      ( 2021 07:20 )             39.3     WBC Count: 9.74 K/uL ( @ 07:20)  WBC Count: 13.34 K/uL ( @ 06:43)  WBC Count: 6.37 K/uL ( @ 20:34)          142  |  109<H>  |  25<H>  ----------------------------<  92  4.2   |  25  |  1.03    Ca    8.7      2021 07:20  Phos  2.7     -20  Mg     1.9     20    TPro  6.1  /  Alb  2.0<L>  /  TBili  0.5  /  DBili  x   /  AST  29  /  ALT  33  /  AlkPhos  78  02-20      Urinalysis Basic - ( 2021 23:07 )    Color: Yellow / Appearance: Clear / S.010 / pH: x  Gluc: x / Ketone: Negative  / Bili: Negative / Urobili: Negative   Blood: x / Protein: 30 mg/dL / Nitrite: Negative   Leuk Esterase: Small / RBC: 10-25 /HPF / WBC 6-10 /HPF   Sq Epi: x / Non Sq Epi: Occasional /HPF / Bacteria: Many /HPF        LIVER FUNCTIONS - ( 2021 07:20 )  Alb: 2.0 g/dL / Pro: 6.1 g/dL / ALK PHOS: 78 U/L / ALT: 33 U/L DA / AST: 29 U/L / GGT: x             PT/INR - ( 2021 20:34 )   PT: 16.2 sec;   INR: 1.38 ratio         PTT - ( 2021 20:34 )  PTT:31.6 sec    LACTATE:    ABG - ABG - ( 2021 03:24 )  pH, Arterial: 7.36  pH, Blood: x     /  pCO2: 40    /  pO2: 107   / HCO3: 22    / Base Excess: -2.6  /  SaO2: 98                  CULTURES:   .Blood Blood-Peripheral   @ 03:00   Growth in anaerobic bottle: Gram Positive Cocci in Pairs and Chains  ***Blood Panel PCR results on this specimen are available  approximately 3 hours after the Gram stain result.***  Gram stain, PCR, and/or culture results may not always  correspond due to difference in methodologies.  ************************************************************  This PCR assay was performed by multiplex PCR. This  Assay tests for 66 bacterial and resistance gene targets.  Please refer to the [facility] test directory  at [email or web address] for details.  --  Blood Culture PCR      .Urine Clean Catch (Midstream)   @ 02:34   50,000 - 99,000 CFU/mL Klebsiella pneumoniae  --  --          RADIOLOGY:      ROS  [  ] UNABLE TO ELICIT               HPI:  75 Y M from [facility] rehab, non ambulatory with PMhx of CVA( twice first in 2020, second 3 weeks ago), HTN, HLD, Prediabetes,BPH was brought in from NH for unresponsiveness. As per NH papers, pt was lethargic today and then become unresponsive so EMS was called. As per wife Polish speaking(  [id number]) Pt was confused and had memory loss after the stroke 3 weeks ago when he was admitted to [facility] and then discharged to Rehab on 21. Pt was walking with walker after the first stroke and not much ambulatory after the second stroke. No other history available.     ED course: Pt was unresponsive so was intubated in The ED and Femoral line was placed by the ED physician   Vitals : Febrile to 103 F , , /108  Labs : wbc 6k ,lactate 1.2, Bun/CR 40/1.56  T1 0.018  ECG : NSR   CXR : clear     SH : non smoker , drinks wine ocassionally , no drug use     GOC : FULL CODE  (2021 22:53)      PAST MEDICAL & SURGICAL HISTORY:  BPH (benign prostatic hyperplasia)    Hyperlipidemia    Hypertension    Diabetes    Stroke  2020 , 2021    S/P cholecystectomy        No Known Allergies      Meds:  acetaminophen    Suspension .. 650 milliGRAM(s) Oral every 6 hours PRN  aspirin  chewable 81 milliGRAM(s) Oral daily  atorvastatin 80 milliGRAM(s) Oral at bedtime  chlorhexidine 0.12% Liquid 15 milliLiter(s) Oral Mucosa every 12 hours  chlorhexidine 2% Cloths 1 Application(s) Topical <User Schedule>  dextrose 50% Injectable 25 Gram(s) IV Push once  HYDROmorphone Infusion 0.5 mG/Hr IV Continuous <Continuous>  insulin lispro (ADMELOG) corrective regimen sliding scale   SubCutaneous every 6 hours  lactated ringers. 1000 milliLiter(s) IV Continuous <Continuous>  meropenem  IVPB 1000 milliGRAM(s) IV Intermittent every 12 hours  norepinephrine Infusion 0.05 MICROgram(s)/kG/Min IV Continuous <Continuous>  pantoprazole  Injectable 40 milliGRAM(s) IV Push daily  propofol Infusion 5 MICROgram(s)/kG/Min IV Continuous <Continuous>  rivaroxaban 15 milliGRAM(s) Oral with dinner  vancomycin  IVPB      vancomycin  IVPB 1000 milliGRAM(s) IV Intermittent every 24 hours      SOCIAL HISTORY:  Smoker:  YES / NO        PACK YEARS:                         WHEN QUIT?  ETOH use:  YES / NO               FREQUENCY / QUANTITY:  Ilicit Drug use:  YES / NO  Occupation:  Assisted device use (Cane / Walker):  Live with:    FAMILY HISTORY:      VITALS:  Vital Signs Last 24 Hrs  T(C): 37.6 (2021 16:00), Max: 38.4 (2021 06:00)  T(F): 99.6 (2021 16:00), Max: 101.2 (2021 06:00)  HR: 90 (2021 16:12) (76 - 92)  BP: 137/76 (2021 16:00) (103/68 - 137/76)  BP(mean): 88 (2021 16:00) (77 - 110)  RR: 18 (2021 16:00) (18 - 22)  SpO2: 99% (2021 16:12) (92% - 100%)    LABS/DIAGNOSTIC TESTS:                          12.8   9.74  )-----------( 137      ( 2021 07:20 )             39.3     WBC Count: 9.74 K/uL ( @ 07:20)  WBC Count: 13.34 K/uL ( @ 06:43)  WBC Count: 6.37 K/uL ( @ 20:34)          142  |  109<H>  |  25<H>  ----------------------------<  92  4.2   |  25  |  1.03    Ca    8.7      2021 07:20  Phos  2.7     -20  Mg     1.9     20    TPro  6.1  /  Alb  2.0<L>  /  TBili  0.5  /  DBili  x   /  AST  29  /  ALT  33  /  AlkPhos  78  02-20      Urinalysis Basic - ( 2021 23:07 )    Color: Yellow / Appearance: Clear / S.010 / pH: x  Gluc: x / Ketone: Negative  / Bili: Negative / Urobili: Negative   Blood: x / Protein: 30 mg/dL / Nitrite: Negative   Leuk Esterase: Small / RBC: 10-25 /HPF / WBC 6-10 /HPF   Sq Epi: x / Non Sq Epi: Occasional /HPF / Bacteria: Many /HPF        LIVER FUNCTIONS - ( 2021 07:20 )  Alb: 2.0 g/dL / Pro: 6.1 g/dL / ALK PHOS: 78 U/L / ALT: 33 U/L DA / AST: 29 U/L / GGT: x             PT/INR - ( 2021 20:34 )   PT: 16.2 sec;   INR: 1.38 ratio         PTT - ( 2021 20:34 )  PTT:31.6 sec    LACTATE:    ABG - ABG - ( 2021 03:24 )  pH, Arterial: 7.36  pH, Blood: x     /  pCO2: 40    /  pO2: 107   / HCO3: 22    / Base Excess: -2.6  /  SaO2: 98                  CULTURES:   .Blood Blood-Peripheral   @ 03:00   Growth in anaerobic bottle: Gram Positive Cocci in Pairs and Chains  ***Blood Panel PCR results on this specimen are available  approximately 3 hours after the Gram stain result.***  Gram stain, PCR, and/or culture results may not always  correspond due to difference in methodologies.  ************************************************************  This PCR assay was performed by multiplex PCR. This  Assay tests for 66 bacterial and resistance gene targets.  Please refer to the [facility] test directory  at [email or web address] for details.  --  Blood Culture PCR      .Urine Clean Catch (Midstream)   @ 02:34   50,000 - 99,000 CFU/mL Klebsiella pneumoniae  --  --          RADIOLOGY:< from: CT Abdomen and Pelvis No Cont (21 @ 02:43) >  EXAM:  CT ABDOMEN AND PELVIS                          EXAM:  CT CHEST                            PROCEDURE DATE:  2021          INTERPRETATION:  CLINICAL INFORMATION:  Fever.    COMPARISON: None.    PROCEDURE:  CT of the Chest, Abdomen and Pelvis was performed without intravenous contrast.  Intravenous contrast: None.  Oral contrast: None.  Sagittal and coronal reformats were performed.  Postprocessed chest MIP reformatted images were created and reviewed.    FINDINGS:    CHEST:    LINES AND TUBES: Endotracheal tube and endogastric tube appear in place.  LUNGS AND LARGE AIRWAYS: Patent central airways. Right middle lobe as well as patchy bibasilar opacities. Findings nonspecific, likely represent developing pneumonia or aspiration.  PLEURA: No pleural effusion.  VESSELS: Atherosclerotic disease of the aorta and its branches.  HEART: Trace pericardial effusion and/or thickening with mild cardiomegaly. Coronary artery calcifications.  MEDIASTINUM AND JESSY: No lymphadenopathy.  CHESTWALL AND LOWER NECK: Bilateral gynecomastia.    ABDOMEN AND PELVIS:    LIVER: Enlarged measuring up to 20 cm in length.  BILE DUCTS: No significant dilatation.  GALLBLADDER: Cholecystectomy.  SPLEEN: Within normal limits.  PANCREAS: Within normal limits.  ADRENALS: Within normal limits.  KIDNEYS/URETERS: No hydronephrosis. Punctate nonobstructive stone in the upper pole of left kidney. Trace nonspecific bilateral perinephric stranding. 1.6 cm right renal cyst of higher attenuation than simple fluid. Consider nonemergent correlation with renal ultrasound.    BLADDER: Decompressed containing Garcia catheter.  REPRODUCTIVE ORGANS: Prominent prostate measuring up to 5.6 cm in transverse length.    BOWEL: No bowel obstruction. Colonic diverticulosis without acute diverticulitis. Appendix is not visualized without secondary findings of acute appendicitis.  PERITONEUM: No ascites.  VESSELS:  Atherosclerotic disease of the aorta and its branches.  RETROPERITONEUM: No lymphadenopathy.  ABDOMINAL WALL: A small fat containing umbilical and bilateral groin hernia is noted. Right groin femoral catheter identified.  BONES: Multilevel degenerative changes and scoliosis. Indeterminate sclerotic focus in T4 vertebral body.    IMPRESSION:    Right middle lobe as well as patchy bibasilar opacities. Findings nonspecific, likely represent developing pneumonia or aspiration.    No acute bowel inflammatory changes or obstruction.    No hydronephrosis. Punctate nonobstructive stone in the upper pole of left kidney. Trace nonspecific bilateral perinephric stranding.    1.6 cm right renal cyst of higher attenuation than simple fluid. Consider nonemergent correlation with renal ultrasound.    Additional findings as mentioned above.              DEWAYNE RAYMUNDO MD; Attending Radiologist  This document has been electronically signed. 2021  3:07AM    < end of copied text >        ROS  [  ] UNABLE TO ELICIT               HPI:  75 Y M from [facility] rehab, non ambulatory with PMhx of CVA( twice first in 2020, second 3 weeks ago), HTN, HLD, Prediabetes,BPH was brought in from NH for unresponsiveness. As per NH papers, pt was lethargic today and then become unresponsive so EMS was called. As per wife Polish speaking(  [id number]) Pt was confused and had memory loss after the stroke 3 weeks ago when he was admitted to [facility] and then discharged to Rehab on 21. Pt was walking with walker after the first stroke and not much ambulatory after the second stroke. No other history available.     ED course: Pt was unresponsive so was intubated in The ED and Femoral line was placed by the ED physician   Vitals : Febrile to 103 F , , /108  Labs : wbc 6k ,lactate 1.2, Bun/CR 40/1.56    SH : non smoker , drinks wine occasionally , no drug use       History as above, Pt who is currently in the ICU, he is Intubated , sedated and vent dependent, he is on pressors for septic shock. He appears to have aspiration Pneumonia as he has thick tracheal secretions, he has bacteremia but with Coag negative organisms so likely contaminants, he also has a positive U/A but urine in jackson appears clear.      PAST MEDICAL & SURGICAL HISTORY:  BPH (benign prostatic hyperplasia)    Hyperlipidemia    Hypertension    Diabetes    Stroke  2020 , 2021    S/P cholecystectomy        No Known Allergies      Meds:  acetaminophen    Suspension .. 650 milliGRAM(s) Oral every 6 hours PRN  aspirin  chewable 81 milliGRAM(s) Oral daily  atorvastatin 80 milliGRAM(s) Oral at bedtime  chlorhexidine 0.12% Liquid 15 milliLiter(s) Oral Mucosa every 12 hours  chlorhexidine 2% Cloths 1 Application(s) Topical <User Schedule>  dextrose 50% Injectable 25 Gram(s) IV Push once  HYDROmorphone Infusion 0.5 mG/Hr IV Continuous <Continuous>  insulin lispro (ADMELOG) corrective regimen sliding scale   SubCutaneous every 6 hours  lactated ringers. 1000 milliLiter(s) IV Continuous <Continuous>  meropenem  IVPB 1000 milliGRAM(s) IV Intermittent every 12 hours  norepinephrine Infusion 0.05 MICROgram(s)/kG/Min IV Continuous <Continuous>  pantoprazole  Injectable 40 milliGRAM(s) IV Push daily  propofol Infusion 5 MICROgram(s)/kG/Min IV Continuous <Continuous>  rivaroxaban 15 milliGRAM(s) Oral with dinner  vancomycin  IVPB      vancomycin  IVPB 1000 milliGRAM(s) IV Intermittent every 24 hours        FAMILY HISTORY: Unable to elicit      VITALS:  Vital Signs Last 24 Hrs  T(C): 37.6 (2021 16:00), Max: 38.4 (2021 06:00)  T(F): 99.6 (2021 16:00), Max: 101.2 (2021 06:00)  HR: 90 (2021 16:12) (76 - 92)  BP: 137/76 (2021 16:00) (103/68 - 137/76)  BP(mean): 88 (2021 16:00) (77 - 110)  RR: 18 (2021 16:00) (18 - 22)  SpO2: 99% (2021 16:12) (92% - 100%)    LABS/DIAGNOSTIC TESTS:                          12.8   9.74  )-----------( 137      ( 2021 07:20 )             39.3     WBC Count: 9.74 K/uL ( @ 07:20)  WBC Count: 13.34 K/uL ( @ 06:43)  WBC Count: 6.37 K/uL ( @ 20:34)          142  |  109<H>  |  25<H>  ----------------------------<  92  4.2   |  25  |  1.03    Ca    8.7      2021 07:20  Phos  2.7     02-20  Mg     1.9     20    TPro  6.1  /  Alb  2.0<L>  /  TBili  0.5  /  DBili  x   /  AST  29  /  ALT  33  /  AlkPhos  78  02-20      Urinalysis Basic - ( 2021 23:07 )    Color: Yellow / Appearance: Clear / S.010 / pH: x  Gluc: x / Ketone: Negative  / Bili: Negative / Urobili: Negative   Blood: x / Protein: 30 mg/dL / Nitrite: Negative   Leuk Esterase: Small / RBC: 10-25 /HPF / WBC 6-10 /HPF   Sq Epi: x / Non Sq Epi: Occasional /HPF / Bacteria: Many /HPF        LIVER FUNCTIONS - ( 2021 07:20 )  Alb: 2.0 g/dL / Pro: 6.1 g/dL / ALK PHOS: 78 U/L / ALT: 33 U/L DA / AST: 29 U/L / GGT: x             PT/INR - ( 2021 20:34 )   PT: 16.2 sec;   INR: 1.38 ratio         PTT - ( 2021 20:34 )  PTT:31.6 sec    LACTATE:    ABG - ABG - ( 2021 03:24 )  pH, Arterial: 7.36  pH, Blood: x     /  pCO2: 40    /  pO2: 107   / HCO3: 22    / Base Excess: -2.6  /  SaO2: 98                  CULTURES:   .Blood Blood-Peripheral   @ 03:00   Growth in anaerobic bottle: Gram Positive Cocci in Pairs and Chains  ***Blood Panel PCR results on this specimen are available  approximately 3 hours after the Gram stain result.***  Gram stain, PCR, and/or culture results may not always  correspond due to difference in methodologies.  ************************************************************  This PCR assay was performed by multiplex PCR. This  Assay tests for 66 bacterial and resistance gene targets.  Please refer to the [facility] test directory  at [email or web address] for details.  --  Blood Culture PCR      .Urine Clean Catch (Midstream)   @ 02:34   50,000 - 99,000 CFU/mL Klebsiella pneumoniae  --  --          RADIOLOGY:< from: CT Abdomen and Pelvis No Cont (21 @ 02:43) >  EXAM:  CT ABDOMEN AND PELVIS                          EXAM:  CT CHEST                            PROCEDURE DATE:  2021          INTERPRETATION:  CLINICAL INFORMATION:  Fever.    COMPARISON: None.    PROCEDURE:  CT of the Chest, Abdomen and Pelvis was performed without intravenous contrast.  Intravenous contrast: None.  Oral contrast: None.  Sagittal and coronal reformats were performed.  Postprocessed chest MIP reformatted images were created and reviewed.    FINDINGS:    CHEST:    LINES AND TUBES: Endotracheal tube and endogastric tube appear in place.  LUNGS AND LARGE AIRWAYS: Patent central airways. Right middle lobe as well as patchy bibasilar opacities. Findings nonspecific, likely represent developing pneumonia or aspiration.  PLEURA: No pleural effusion.  VESSELS: Atherosclerotic disease of the aorta and its branches.  HEART: Trace pericardial effusion and/or thickening with mild cardiomegaly. Coronary artery calcifications.  MEDIASTINUM AND JESSY: No lymphadenopathy.  CHESTWALL AND LOWER NECK: Bilateral gynecomastia.    ABDOMEN AND PELVIS:    LIVER: Enlarged measuring up to 20 cm in length.  BILE DUCTS: No significant dilatation.  GALLBLADDER: Cholecystectomy.  SPLEEN: Within normal limits.  PANCREAS: Within normal limits.  ADRENALS: Within normal limits.  KIDNEYS/URETERS: No hydronephrosis. Punctate nonobstructive stone in the upper pole of left kidney. Trace nonspecific bilateral perinephric stranding. 1.6 cm right renal cyst of higher attenuation than simple fluid. Consider nonemergent correlation with renal ultrasound.    BLADDER: Decompressed containing Jackson catheter.  REPRODUCTIVE ORGANS: Prominent prostate measuring up to 5.6 cm in transverse length.    BOWEL: No bowel obstruction. Colonic diverticulosis without acute diverticulitis. Appendix is not visualized without secondary findings of acute appendicitis.  PERITONEUM: No ascites.  VESSELS:  Atherosclerotic disease of the aorta and its branches.  RETROPERITONEUM: No lymphadenopathy.  ABDOMINAL WALL: A small fat containing umbilical and bilateral groin hernia is noted. Right groin femoral catheter identified.  BONES: Multilevel degenerative changes and scoliosis. Indeterminate sclerotic focus in T4 vertebral body.    IMPRESSION:    Right middle lobe as well as patchy bibasilar opacities. Findings nonspecific, likely represent developing pneumonia or aspiration.    No acute bowel inflammatory changes or obstruction.    No hydronephrosis. Punctate nonobstructive stone in the upper pole of left kidney. Trace nonspecific bilateral perinephric stranding.    1.6 cm right renal cyst of higher attenuation than simple fluid. Consider nonemergent correlation with renal ultrasound.    Additional findings as mentioned above.              DEWAYNE RAYMUNDO MD; Attending Radiologist  This document has been electronically signed. 2021  3:07AM    < end of copied text >        ROS  [ x ] UNABLE TO ELICIT

## 2021-02-20 NOTE — PROGRESS NOTE ADULT - SUBJECTIVE AND OBJECTIVE BOX
Patient is a 75y old  Male who presents with a chief complaint of Unresponsive (2021 03:58)      INTERVAL HPI/OVERNIGHT EVENTS:  T(C): 37.3 (21 @ 12:00), Max: 38.4 (21 @ 06:00)  HR: 78 (21 @ 13:00) (71 - 92)  BP: 115/71 (21 @ 13:00) (103/68 - 136/99)  RR: 18 (21 @ 13:00) (18 - 20)  SpO2: 100% (21 @ 13:00) (93% - 100%)  Wt(kg): --  I&O's Summary    2021 07:01  -  2021 07:00  --------------------------------------------------------  IN: 1330.1 mL / OUT: 1695 mL / NET: -364.9 mL    2021 07:01  -  2021 15:27  --------------------------------------------------------  IN: 427 mL / OUT: 232 mL / NET: 195 mL        LABS:                        12.8   9.74  )-----------( 137      ( 2021 07:20 )             39.3     02-20    142  |  109<H>  |  25<H>  ----------------------------<  92  4.2   |  25  |  1.03    Ca    8.7      2021 07:20  Phos  2.7     02-20  Mg     1.9     -20    TPro  6.1  /  Alb  2.0<L>  /  TBili  0.5  /  DBili  x   /  AST  29  /  ALT  33  /  AlkPhos  78  02-20    PT/INR - ( 2021 20:34 )   PT: 16.2 sec;   INR: 1.38 ratio         PTT - ( 2021 20:34 )  PTT:31.6 sec  Urinalysis Basic - ( 2021 23:07 )    Color: Yellow / Appearance: Clear / S.010 / pH: x  Gluc: x / Ketone: Negative  / Bili: Negative / Urobili: Negative   Blood: x / Protein: 30 mg/dL / Nitrite: Negative   Leuk Esterase: Small / RBC: 10-25 /HPF / WBC 6-10 /HPF   Sq Epi: x / Non Sq Epi: Occasional /HPF / Bacteria: Many /HPF      CAPILLARY BLOOD GLUCOSE      POCT Blood Glucose.: 80 mg/dL (2021 05:47)  POCT Blood Glucose.: 86 mg/dL (2021 17:23)  POCT Blood Glucose.: 69 mg/dL (2021 17:21)    ABG - ( 2021 03:24 )  pH, Arterial: 7.36  pH, Blood: x     /  pCO2: 40    /  pO2: 107   / HCO3: 22    / Base Excess: -2.6  /  SaO2: 98                  RADIOLOGY & ADDITIONAL TESTS:    Consultant(s) Notes Reviewed:  [x ] YES  [ ] NO    MEDICATIONS  (STANDING):  aspirin  chewable 81 milliGRAM(s) Oral daily  atorvastatin 80 milliGRAM(s) Oral at bedtime  chlorhexidine 0.12% Liquid 15 milliLiter(s) Oral Mucosa every 12 hours  chlorhexidine 2% Cloths 1 Application(s) Topical <User Schedule>  dextrose 50% Injectable 25 Gram(s) IV Push once  HYDROmorphone Infusion 0.5 mG/Hr (0.5 mL/Hr) IV Continuous <Continuous>  insulin lispro (ADMELOG) corrective regimen sliding scale   SubCutaneous every 6 hours  lactated ringers. 1000 milliLiter(s) (75 mL/Hr) IV Continuous <Continuous>  meropenem  IVPB 1000 milliGRAM(s) IV Intermittent every 12 hours  norepinephrine Infusion 0.05 MICROgram(s)/kG/Min (5.75 mL/Hr) IV Continuous <Continuous>  pantoprazole  Injectable 40 milliGRAM(s) IV Push daily  propofol Infusion 5 MICROgram(s)/kG/Min (3.68 mL/Hr) IV Continuous <Continuous>  rivaroxaban 15 milliGRAM(s) Oral with dinner  vancomycin  IVPB      vancomycin  IVPB 1000 milliGRAM(s) IV Intermittent every 24 hours    MEDICATIONS  (PRN):  acetaminophen    Suspension .. 650 milliGRAM(s) Oral every 6 hours PRN Temp greater or equal to 38C (100.4F), Mild Pain (1 - 3)      PHYSICAL EXAM:  GENERAL: well built, well nourished  HEAD:  Atraumatic, Normocephalic  EYES: EOMI, PERRLA, conjunctiva and sclera clear  ENT: No tonsillar erythema, exudates, or enlargement; Moist mucous membranes, Good dentition, No lesions  NECK: Supple, No JVD, Normal thyroid, no enlarged nodes, ETT  NERVOUS SYSTEM: No new FND noted  CHEST/LUNG: B/L fair air entry; No rales, rhonchi, or wheezing  HEART: S1S2 normal, no S3, Regular rate and rhythm; No murmurs, rubs, or gallops  ABDOMEN: Soft, Nontender, Nondistended; Bowel sounds present  EXTREMITIES:  2+ Peripheral Pulses, No clubbing, cyanosis, or edema  LYMPH: No lymphadenopathy noted  SKIN: No rashes or lesions    Care Discussed with Consultants/Other Providers [ x] YES  [ ] NO

## 2021-02-20 NOTE — CONSULT NOTE ADULT - ASSESSMENT
Septic Shock  Pneumonia - likely aspiration  Fevers  Bacteremia - likely contamination  Leukocytosis - decreased  Resp failure    Plan - Cont Meropenem 1 gm iv q12 hrs  DC Vancomycin  Repeat blood cultures. Septic Shock  Pneumonia - likely aspiration  Fevers  Bacteremia - likely contamination  Leukocytosis - decreased  ?UTI  Resp failure    Plan - Cont Meropenem 1 gm iv q12 hrs  DC Vancomycin  Repeat blood cultures.

## 2021-02-20 NOTE — PROGRESS NOTE ADULT - ASSESSMENT
75 Y M from [facility] rehab, non ambulatory with PMhx of CVA( twice first in July 2020, second 3 weeks ago), HTN, HLD, Prediabetes,BPH was brought in from NH for unresponsiveness.   ED course: Pt was unresponsive so was intubated in The ED and Femoral line was placed by the ED physician   Vitals : Febrile to 103 F , , /108  Labs : wbc 6k ,lactate 1.2, Bun/CR 40/1.56  T1 0.018  ECG : NSR   CXR : clear    Pt will be admitted to ICU for Acute hypoxic Respiratory Failure and Septic shock         Assessment:  1. Acute Hypoxic Respiratory Failure     2. Acute Encephalopathy   3. Septic shock   4. CVA   5. pre-diabetes   6. HTN   7. BPH     Plan:    =====================[CNS ]==============================  # Acute Encephalopathy :    - recent stroke 3 weeks ago , alert ,oriented and follows commands as per NH papers at baseline, confused and memory loss after stroke as per wife   -  CTH no acute event ,Age-indeterminate left PCA territory infarction.     # CVA :   - L post cerebral A stroke as per NH papers   - Pt is on Aspirin, xarelto and statins   -  c/w all if no bleeding on CTH     =====================[CVS ]==============================  # Septic shock :   - likely 2/2 Urosepsis   - UA +ve   - c/w vasopressor support   -  on Meropenem 1g q12 for now , can de-escalate pending culture results   - f/u BCx ,UCx     # HTN :   - pt is on toprol, losartan and nifedipine   - Hold BP medications   - c/w vasopressors for BP support   - trend trops   - f/u Echo     =====================[RESP ]==============================  # Acute Hypoxic Resp Failure :   - Pt was intubated as pt was unresponsive   - CXR Clear   - CT chest showed Right middle lobe as well as patchy bibasilar opacities.    - c/w Mech vent     =====================[ GI ]================================  # No issues :   - NGT in place   -TF     ====================[ RENAL ]=============================   # URSULA over CKD :   - baseline Cr around 1.4   - Bun/cr 40/1.56   - FC in place   - on  gentle hydration w/LR @ 75 cc x 12hrs  - monitor urine output   - I&Os  - Monitor electrolytes     =====================[ ID ]================================  # Urosepsis :   -  febrile to 103 F, no wbc count or lactate , CXR clear   - UA +ve   - f/u BCx and UCx, PCt    - s/p 1 dose of vanco and zosyn in the ED   - will c/w empirical ABx     ===================[ HEME/ONC ]===========================  # No issues :  - Hb and Plt stable   - monitor cbc daily     =====================[ ENDO ]=============================  # Pre -diabetes : -  - Last A1c 6.2   - target CBG < 180  - FS q6 while NPO, will start on ISS   - Start diet when possible    ==================[ SKIN/ CATHETERS ]======================  # no wound or skin break   # R fem line     ==================[ PROPHYLAXIS ]==========================   # Dvt : Xarelto  # Gi : Protonix     ====================[ DISPO ]==============================   - Monitor in ICU     ===================[ PROGNOSIS ]===========================  - Guarded

## 2021-02-20 NOTE — PROGRESS NOTE ADULT - ASSESSMENT
75 Y M from [facility], non ambulatory with PMhx of CVA( twice first in July 2020, second 3 weeks ago), HTN, HLD, Prediabetes,BPH was brought in from NH for unresponsiveness.   ED course: Pt was unresponsive so was intubated in The ED and Femoral line was placed by the ED physician   Vitals : Febrile to 103 F , , /108  Labs : wbc 6k ,lactate 1.2, Bun/CR 40/1.56  T1 0.018  ECG : NSR   CXR : clear    Pt will be admitted to ICU for Acute hypoxic Respiratory Failure and Septic shock         Assessment:  1. Acute Hypoxic Respiratory Failure     2. Acute Encephalopathy   3. Septic shock   4. CVA   5. pre-diabetes   6. HTN   7. BPH     Plan:    =====================[CNS ]==============================  # Acute Encephalopathy :    - recent stroke 3 weeks ago , alert ,oriented and follows commands as per NH papers at baseline, confused and memory loss after stroke as per wife   -  CTH no acute event ,Age-indeterminate left PCA territory infarction.     # CVA :   - L post cerebral A stroke as per NH papers   - Pt is on Aspirin, xarelto and statins   -  c/w all if no bleeding on CTH     =====================[CVS ]==============================  # Septic shock :   - likely 2/2 Urosepsis   - UA +ve   - c/w vasopressor support   -  on Meropenem 1g q12 for now , can de-escalate pending culture results   - f/u BCx ,UCx     # HTN :   - pt is on toprol, losartan and nifedipine   - Hold BP medications   - c/w vasopressors for BP support   - trend trops   - f/u Echo     =====================[RESP ]==============================  # Acute Hypoxic Resp Failure :   - Pt was intubated as pt was unresponsive   - CXR Clear   - CT chest showed Right middle lobe as well as patchy bibasilar opacities.    - c/w Mech vent     =====================[ GI ]================================  # No issues :   - NGT in place   -TF     ====================[ RENAL ]=============================   # URSULA over CKD :   - baseline Cr around 1.4   - Bun/cr 40/1.56   - FC in place   - on  gentle hydration w/LR @ 75 cc x 12hrs  - monitor urine output   - I&Os  - Monitor electrolytes     =====================[ ID ]================================  # Urosepsis :   -  febrile to 103 F, no wbc count or lactate , CXR clear   - UA +ve   - f/u BCx and UCx, PCt    - s/p 1 dose of vanco and zosyn in the ED   - will c/w empirical ABx     ===================[ HEME/ONC ]===========================  # No issues :  - Hb and Plt stable   - monitor cbc daily     =====================[ ENDO ]=============================  # Pre -diabetes : -  - Last A1c 6.2   - target CBG < 180  - FS q6 while NPO, will start on ISS   - Start diet when possible    ==================[ SKIN/ CATHETERS ]======================  # no wound or skin break   # R fem line     ==================[ PROPHYLAXIS ]==========================   # Dvt : Xarelto  # Gi : Protonix     ====================[ DISPO ]==============================   - Monitor in ICU     ===================[ PROGNOSIS ]===========================  - Guarded

## 2021-02-21 LAB
-  AMIKACIN: SIGNIFICANT CHANGE UP
-  AMOXICILLIN/CLAVULANIC ACID: SIGNIFICANT CHANGE UP
-  AMPICILLIN/SULBACTAM: SIGNIFICANT CHANGE UP
-  AMPICILLIN: SIGNIFICANT CHANGE UP
-  AMPICILLIN: SIGNIFICANT CHANGE UP
-  AZTREONAM: SIGNIFICANT CHANGE UP
-  CEFAZOLIN: SIGNIFICANT CHANGE UP
-  CEFEPIME: SIGNIFICANT CHANGE UP
-  CEFOXITIN: SIGNIFICANT CHANGE UP
-  CEFTRIAXONE: SIGNIFICANT CHANGE UP
-  CIPROFLOXACIN: SIGNIFICANT CHANGE UP
-  ERTAPENEM: SIGNIFICANT CHANGE UP
-  GENTAMICIN SYNERGY: SIGNIFICANT CHANGE UP
-  GENTAMICIN: SIGNIFICANT CHANGE UP
-  IMIPENEM: SIGNIFICANT CHANGE UP
-  LEVOFLOXACIN: SIGNIFICANT CHANGE UP
-  MEROPENEM: SIGNIFICANT CHANGE UP
-  NITROFURANTOIN: SIGNIFICANT CHANGE UP
-  PIPERACILLIN/TAZOBACTAM: SIGNIFICANT CHANGE UP
-  STREPTOMYCIN SYNERGY: SIGNIFICANT CHANGE UP
-  TIGECYCLINE: SIGNIFICANT CHANGE UP
-  TOBRAMYCIN: SIGNIFICANT CHANGE UP
-  TRIMETHOPRIM/SULFAMETHOXAZOLE: SIGNIFICANT CHANGE UP
-  VANCOMYCIN: SIGNIFICANT CHANGE UP
ALBUMIN SERPL ELPH-MCNC: 2 G/DL — LOW (ref 3.5–5)
ALP SERPL-CCNC: 442 U/L — HIGH (ref 40–120)
ALT FLD-CCNC: 551 U/L DA — HIGH (ref 10–60)
ANION GAP SERPL CALC-SCNC: 9 MMOL/L — SIGNIFICANT CHANGE UP (ref 5–17)
AST SERPL-CCNC: 713 U/L — HIGH (ref 10–40)
BASE EXCESS BLDA CALC-SCNC: -1.5 MMOL/L — SIGNIFICANT CHANGE UP (ref -2–2)
BASE EXCESS BLDV CALC-SCNC: -0.3 MMOL/L — SIGNIFICANT CHANGE UP (ref -2–2)
BILIRUB SERPL-MCNC: 1.9 MG/DL — HIGH (ref 0.2–1.2)
BLOOD GAS COMMENTS ARTERIAL: SIGNIFICANT CHANGE UP
BLOOD GAS COMMENTS, VENOUS: SIGNIFICANT CHANGE UP
BUN SERPL-MCNC: 34 MG/DL — HIGH (ref 7–18)
CALCIUM SERPL-MCNC: 8.7 MG/DL — SIGNIFICANT CHANGE UP (ref 8.4–10.5)
CHLORIDE SERPL-SCNC: 110 MMOL/L — HIGH (ref 96–108)
CO2 SERPL-SCNC: 23 MMOL/L — SIGNIFICANT CHANGE UP (ref 22–31)
CREAT SERPL-MCNC: 1.27 MG/DL — SIGNIFICANT CHANGE UP (ref 0.5–1.3)
CULTURE RESULTS: SIGNIFICANT CHANGE UP
CULTURE RESULTS: SIGNIFICANT CHANGE UP
GLUCOSE BLDC GLUCOMTR-MCNC: 135 MG/DL — HIGH (ref 70–99)
GLUCOSE BLDC GLUCOMTR-MCNC: 147 MG/DL — HIGH (ref 70–99)
GLUCOSE BLDC GLUCOMTR-MCNC: 147 MG/DL — HIGH (ref 70–99)
GLUCOSE SERPL-MCNC: 140 MG/DL — HIGH (ref 70–99)
HCO3 BLDA-SCNC: 22 MMOL/L — LOW (ref 23–27)
HCO3 BLDV-SCNC: 26 MMOL/L — SIGNIFICANT CHANGE UP (ref 21–29)
HCT VFR BLD CALC: 41.4 % — SIGNIFICANT CHANGE UP (ref 39–50)
HGB BLD-MCNC: 13.6 G/DL — SIGNIFICANT CHANGE UP (ref 13–17)
HOROWITZ INDEX BLDA+IHG-RTO: 40 — SIGNIFICANT CHANGE UP
HOROWITZ INDEX BLDV+IHG-RTO: 40 — SIGNIFICANT CHANGE UP
MAGNESIUM SERPL-MCNC: 2.1 MG/DL — SIGNIFICANT CHANGE UP (ref 1.6–2.6)
MCHC RBC-ENTMCNC: 32.1 PG — SIGNIFICANT CHANGE UP (ref 27–34)
MCHC RBC-ENTMCNC: 32.9 GM/DL — SIGNIFICANT CHANGE UP (ref 32–36)
MCV RBC AUTO: 97.6 FL — SIGNIFICANT CHANGE UP (ref 80–100)
METHOD TYPE: SIGNIFICANT CHANGE UP
METHOD TYPE: SIGNIFICANT CHANGE UP
NRBC # BLD: 0 /100 WBCS — SIGNIFICANT CHANGE UP (ref 0–0)
ORGANISM # SPEC MICROSCOPIC CNT: SIGNIFICANT CHANGE UP
PCO2 BLDA: 38 MMHG — SIGNIFICANT CHANGE UP (ref 32–46)
PCO2 BLDV: 51 MMHG — HIGH (ref 35–50)
PH BLDA: 7.39 — SIGNIFICANT CHANGE UP (ref 7.35–7.45)
PH BLDV: 7.33 — LOW (ref 7.35–7.45)
PHOSPHATE SERPL-MCNC: 3.3 MG/DL — SIGNIFICANT CHANGE UP (ref 2.5–4.5)
PLATELET # BLD AUTO: 151 K/UL — SIGNIFICANT CHANGE UP (ref 150–400)
PO2 BLDA: 121 MMHG — HIGH (ref 74–108)
PO2 BLDV: 45 MMHG — SIGNIFICANT CHANGE UP (ref 25–45)
POTASSIUM SERPL-MCNC: 4.4 MMOL/L — SIGNIFICANT CHANGE UP (ref 3.5–5.3)
POTASSIUM SERPL-SCNC: 4.4 MMOL/L — SIGNIFICANT CHANGE UP (ref 3.5–5.3)
PROT SERPL-MCNC: 6.3 G/DL — SIGNIFICANT CHANGE UP (ref 6–8.3)
RBC # BLD: 4.24 M/UL — SIGNIFICANT CHANGE UP (ref 4.2–5.8)
RBC # FLD: 12 % — SIGNIFICANT CHANGE UP (ref 10.3–14.5)
SAO2 % BLDA: 98 % — HIGH (ref 92–96)
SAO2 % BLDV: 75 % — SIGNIFICANT CHANGE UP (ref 67–88)
SODIUM SERPL-SCNC: 142 MMOL/L — SIGNIFICANT CHANGE UP (ref 135–145)
SPECIMEN SOURCE: SIGNIFICANT CHANGE UP
SPECIMEN SOURCE: SIGNIFICANT CHANGE UP
TRIGL SERPL-MCNC: 155 MG/DL — HIGH
WBC # BLD: 5.54 K/UL — SIGNIFICANT CHANGE UP (ref 3.8–10.5)
WBC # FLD AUTO: 5.54 K/UL — SIGNIFICANT CHANGE UP (ref 3.8–10.5)

## 2021-02-21 PROCEDURE — 76705 ECHO EXAM OF ABDOMEN: CPT | Mod: 26

## 2021-02-21 PROCEDURE — 71045 X-RAY EXAM CHEST 1 VIEW: CPT | Mod: 26,77

## 2021-02-21 PROCEDURE — 71045 X-RAY EXAM CHEST 1 VIEW: CPT | Mod: 26

## 2021-02-21 RX ORDER — MIDODRINE HYDROCHLORIDE 2.5 MG/1
10 TABLET ORAL EVERY 8 HOURS
Refills: 0 | Status: DISCONTINUED | OUTPATIENT
Start: 2021-02-21 | End: 2021-02-21

## 2021-02-21 RX ORDER — MIDODRINE HYDROCHLORIDE 2.5 MG/1
10 TABLET ORAL EVERY 8 HOURS
Refills: 0 | Status: DISCONTINUED | OUTPATIENT
Start: 2021-02-21 | End: 2021-02-26

## 2021-02-21 RX ORDER — VANCOMYCIN HCL 1 G
1000 VIAL (EA) INTRAVENOUS EVERY 12 HOURS
Refills: 0 | Status: DISCONTINUED | OUTPATIENT
Start: 2021-02-21 | End: 2021-02-22

## 2021-02-21 RX ADMIN — PROPOFOL 3.68 MICROGRAM(S)/KG/MIN: 10 INJECTION, EMULSION INTRAVENOUS at 06:46

## 2021-02-21 RX ADMIN — PROPOFOL 3.68 MICROGRAM(S)/KG/MIN: 10 INJECTION, EMULSION INTRAVENOUS at 16:37

## 2021-02-21 RX ADMIN — MEROPENEM 100 MILLIGRAM(S): 1 INJECTION INTRAVENOUS at 05:25

## 2021-02-21 RX ADMIN — MIDODRINE HYDROCHLORIDE 10 MILLIGRAM(S): 2.5 TABLET ORAL at 22:48

## 2021-02-21 RX ADMIN — Medication 81 MILLIGRAM(S): at 12:13

## 2021-02-21 RX ADMIN — CHLORHEXIDINE GLUCONATE 1 APPLICATION(S): 213 SOLUTION TOPICAL at 05:21

## 2021-02-21 RX ADMIN — ENOXAPARIN SODIUM 120 MILLIGRAM(S): 100 INJECTION SUBCUTANEOUS at 05:25

## 2021-02-21 RX ADMIN — CHLORHEXIDINE GLUCONATE 15 MILLILITER(S): 213 SOLUTION TOPICAL at 18:32

## 2021-02-21 RX ADMIN — Medication 650 MILLIGRAM(S): at 07:07

## 2021-02-21 RX ADMIN — Medication 250 MILLIGRAM(S): at 18:33

## 2021-02-21 RX ADMIN — ENOXAPARIN SODIUM 120 MILLIGRAM(S): 100 INJECTION SUBCUTANEOUS at 18:31

## 2021-02-21 RX ADMIN — MEROPENEM 100 MILLIGRAM(S): 1 INJECTION INTRAVENOUS at 18:31

## 2021-02-21 RX ADMIN — Medication 1: at 12:14

## 2021-02-21 RX ADMIN — ATORVASTATIN CALCIUM 80 MILLIGRAM(S): 80 TABLET, FILM COATED ORAL at 22:46

## 2021-02-21 RX ADMIN — CHLORHEXIDINE GLUCONATE 15 MILLILITER(S): 213 SOLUTION TOPICAL at 05:21

## 2021-02-21 RX ADMIN — PANTOPRAZOLE SODIUM 40 MILLIGRAM(S): 20 TABLET, DELAYED RELEASE ORAL at 12:14

## 2021-02-21 RX ADMIN — MIDODRINE HYDROCHLORIDE 10 MILLIGRAM(S): 2.5 TABLET ORAL at 14:05

## 2021-02-21 NOTE — PROCEDURE NOTE - NSINDICATIONS_GEN_A_CORE
critical illness/emergency venous access
altered anatomy/history of difficult urethral catheterization/strict intake/output during critical illness

## 2021-02-21 NOTE — PROGRESS NOTE ADULT - SUBJECTIVE AND OBJECTIVE BOX
Patient is a 75y old  Male who presents with a chief complaint of Unresponsive (21 Feb 2021 02:05)/acute hypoxic respiratory failure/aspiration PNA/intubation/septic shock/bacteremia/pyelophneritis(?), continue vent support/pressor/sedation/IV ABS, F/U pan culture, elevated LFT, shock liver(?)      INTERVAL HPI/OVERNIGHT EVENTS:  T(C): 37.2 (02-21-21 @ 08:00), Max: 38.4 (02-21-21 @ 06:00)  HR: 89 (02-21-21 @ 08:00) (73 - 91)  BP: 95/62 (02-21-21 @ 08:00) (82/59 - 137/76)  RR: 18 (02-21-21 @ 08:00) (18 - 22)  SpO2: 96% (02-21-21 @ 08:00) (92% - 100%)  Wt(kg): --    LABS:                        13.6   5.54  )-----------( 151      ( 21 Feb 2021 05:53 )             41.4     02-21    142  |  110<H>  |  34<H>  ----------------------------<  140<H>  4.4   |  23  |  1.27    Ca    8.7      21 Feb 2021 05:53  Phos  3.3     02-21  Mg     2.1     02-21    TPro  6.3  /  Alb  2.0<L>  /  TBili  1.9<H>  /  DBili  x   /  AST  713<H>  /  ALT  551<H>  /  AlkPhos  442<H>  02-21        CAPILLARY BLOOD GLUCOSE      POCT Blood Glucose.: 147 mg/dL (21 Feb 2021 05:52)  POCT Blood Glucose.: 100 mg/dL (20 Feb 2021 17:45)    ABG - ( 21 Feb 2021 05:07 )  pH, Arterial: 7.39  pH, Blood: x     /  pCO2: 38    /  pO2: 121   / HCO3: 22    / Base Excess: -1.5  /  SaO2: 98                  RADIOLOGY & ADDITIONAL TESTS:    Consultant(s) Notes Reviewed:  [x ] YES  [ ] NO    PHYSICAL EXAM:  GENERAL: well built, well nourished  HEAD:  Atraumatic, Normocephalic  EYES: EOMI, PERRLA, conjunctiva and sclera clear  ENT: No tonsillar erythema, exudates, or enlargement; Moist mucous membranes, Good dentition, No lesions, intubated  NECK: Supple, No JVD, Normal thyroid, no enlarged nodes  NERVOUS SYSTEM:  sedated  CHEST/LUNG: B/L good air entry; No rales, rhonchi, or wheezing  HEART: S1S2 normal, no S3, Regular rate and rhythm; No murmurs, rubs, or gallops  ABDOMEN: Soft, Nontender, Nondistended; Bowel sounds present  EXTREMITIES:  2+ Peripheral Pulses, No clubbing, cyanosis, positive  edema  LYMPH: No lymphadenopathy noted  SKIN: No rashes or lesions    Care Discussed with Consultants/Other Providers [ x] YES  [ ] NO

## 2021-02-21 NOTE — DIETITIAN INITIAL EVALUATION ADULT. - ENTERAL
Rec. change tube feeding to Glucerna 1.5 kcal, goal: 30ml/hr x 24 hrs (703 ml, 1055 Kcal, Protein 68 grams, Free water 534 ml) & add 2 packets of Prosource daily (providing 120 Kcal, Protein 30 grams) as medically feasible

## 2021-02-21 NOTE — PROGRESS NOTE ADULT - ASSESSMENT
75 yr old male fom NH, H/O CVA recent/dementia/HTN/BPH, admit hospital for unresponsive, intubated at ER, CT chest/ABD showed PNA/aspiration(?)/blood culture positive, start pressor for septic shock//IV ABS, F/U pan culture result, ICU care, close monitor lab, titrate pressor, elevated LFT, shock liver(?), close monitor LFT , continue ICU care, vent support, DX unresponsive/acute hypoxic respiratory failure/intubatiob/septic shock/aspiration PNA/shock liver/pyelonephritis/bacteremia.

## 2021-02-21 NOTE — DIETITIAN INITIAL EVALUATION ADULT. - OTHER INFO
Patient from [facility]. Visited pt. debilitated, on vent, tube feeding Jevity 1.5 infusing at 20ml/hr & tolerating, Propofol at 10.9 ml/hr (288 Kcal), skin intact, generalized edema 3+ noted.

## 2021-02-21 NOTE — DIETITIAN INITIAL EVALUATION ADULT. - PERTINENT LABORATORY DATA
02-21 Na142 mmol/L Glu 140 mg/dL<H> K+ 4.4 mmol/L Cr  1.27 mg/dL BUN 34 mg/dL<H> 02-21 Phos 3.3 mg/dL 02-21 Alb 2.0 g/dL<L> 02-21 Chol --    LDL --    HDL --    Trig 155 mg/dL<H>

## 2021-02-21 NOTE — PROGRESS NOTE ADULT - ATTENDING COMMENTS
Assessment:  1. Acute Hypoxic Respiratory Failure     2. Acute Encephalopathy   3. Septic shock   4. CVA   5. pre-diabetes   6. HTN   7. BPH     Plan;  -continue vent support  -adjust vent as per ABG  -sedated and pain control   -hemodynamic support  -monitor blood sugar   -pressors to maintain MAP>65  -monitor biomarkers daily  -change to lovenox therapeutic .

## 2021-02-21 NOTE — PROGRESS NOTE ADULT - SUBJECTIVE AND OBJECTIVE BOX
Patient is a 75y old  Male who presents with a chief complaint of Unresponsive (20 Feb 2021 17:20)      INTERVAL HPI/OVERNIGHT EVENTS:no acute events over night , xarelto changed to FD levonox  ICU Vital Signs Last 24 Hrs  T(C): 37.7 (20 Feb 2021 19:00), Max: 38.4 (20 Feb 2021 06:00)  T(F): 99.8 (20 Feb 2021 19:00), Max: 101.2 (20 Feb 2021 06:00)  HR: 82 (21 Feb 2021 01:00) (77 - 91)  BP: 82/59 (21 Feb 2021 01:00) (82/59 - 137/76)  BP(mean): 68 (21 Feb 2021 01:00) (68 - 97)  ABP: --  ABP(mean): --  RR: 18 (21 Feb 2021 01:00) (18 - 22)  SpO2: 98% (21 Feb 2021 01:00) (92% - 100%)    I&O's Summary    19 Feb 2021 07:01  -  20 Feb 2021 07:00  --------------------------------------------------------  IN: 1330.1 mL / OUT: 1695 mL / NET: -364.9 mL    20 Feb 2021 07:01  -  21 Feb 2021 02:05  --------------------------------------------------------  IN: 1157 mL / OUT: 664 mL / NET: 493 mL      Mode: AC/ CMV (Assist Control/ Continuous Mandatory Ventilation)  RR (machine): 18  TV (machine): 450  FiO2: 40  PEEP: 5  ITime: 1  MAP: 11  PIP: 30      LABS:                        12.8   9.74  )-----------( 137      ( 20 Feb 2021 07:20 )             39.3     02-20    142  |  109<H>  |  25<H>  ----------------------------<  92  4.2   |  25  |  1.03    Ca    8.7      20 Feb 2021 07:20  Phos  2.7     02-20  Mg     1.9     02-20    TPro  6.1  /  Alb  2.0<L>  /  TBili  0.5  /  DBili  x   /  AST  29  /  ALT  33  /  AlkPhos  78  02-20        CAPILLARY BLOOD GLUCOSE      POCT Blood Glucose.: 100 mg/dL (20 Feb 2021 17:45)  POCT Blood Glucose.: 80 mg/dL (20 Feb 2021 05:47)    ABG - ( 19 Feb 2021 03:24 )  pH, Arterial: 7.36  pH, Blood: x     /  pCO2: 40    /  pO2: 107   / HCO3: 22    / Base Excess: -2.6  /  SaO2: 98                  RADIOLOGY & ADDITIONAL TESTS:    Consultant(s) Notes Reviewed:  [x ] YES  [ ] NO    MEDICATIONS  (STANDING):  aspirin  chewable 81 milliGRAM(s) Oral daily  atorvastatin 80 milliGRAM(s) Oral at bedtime  chlorhexidine 0.12% Liquid 15 milliLiter(s) Oral Mucosa every 12 hours  chlorhexidine 2% Cloths 1 Application(s) Topical <User Schedule>  dextrose 50% Injectable 25 Gram(s) IV Push once  enoxaparin Injectable 120 milliGRAM(s) SubCutaneous two times a day  HYDROmorphone Infusion 0.5 mG/Hr (0.5 mL/Hr) IV Continuous <Continuous>  insulin lispro (ADMELOG) corrective regimen sliding scale   SubCutaneous every 6 hours  lactated ringers. 1000 milliLiter(s) (75 mL/Hr) IV Continuous <Continuous>  meropenem  IVPB 1000 milliGRAM(s) IV Intermittent every 12 hours  norepinephrine Infusion 0.05 MICROgram(s)/kG/Min (5.75 mL/Hr) IV Continuous <Continuous>  pantoprazole  Injectable 40 milliGRAM(s) IV Push daily  propofol Infusion 5 MICROgram(s)/kG/Min (3.68 mL/Hr) IV Continuous <Continuous>    MEDICATIONS  (PRN):  acetaminophen    Suspension .. 650 milliGRAM(s) Oral every 6 hours PRN Temp greater or equal to 38C (100.4F), Mild Pain (1 - 3)      PHYSICAL EXAM:  GENERAL: +ETT  HEAD:  Atraumatic, Normocephalic  EYES: EOMI, PERRLA, conjunctiva and sclera clear  ENT: No tonsillar erythema, exudates, or enlargement; Moist mucous membranes, Good dentition, No lesions  NECK: Supple, No JVD, Normal thyroid, no enlarged nodes  NERVOUS SYSTEM:  sedated   CHEST/LUNG: B/L good air entry; No rales, rhonchi, or wheezing  HEART: S1S2 normal, no S3, Regular rate and rhythm; No murmurs, rubs, or gallops  ABDOMEN: Soft, Nontender, Nondistended; Bowel sounds present  EXTREMITIES:  2+ Peripheral Pulses, No clubbing, cyanosis, or edema  LYMPH: No lymphadenopathy noted  SKIN: No rashes or lesions    Care Discussed with Consultants/Other Providers [ x] YES  [ ] NO Patient is a 75y old  Male who presents with a chief complaint of Unresponsive (20 Feb 2021 17:20)      INTERVAL HPI/OVERNIGHT EVENTS:no acute events over night , xarelto changed to FD levonox  Spiked fever overnight- suspect due to unsterile femoral CVC, replaced with LIJ, confirming placement.   Patient's wife was updated regarding patient's situation    ICU Vital Signs Last 24 Hrs  T(C): 37.7 (20 Feb 2021 19:00), Max: 38.4 (20 Feb 2021 06:00)  T(F): 99.8 (20 Feb 2021 19:00), Max: 101.2 (20 Feb 2021 06:00)  HR: 82 (21 Feb 2021 01:00) (77 - 91)  BP: 82/59 (21 Feb 2021 01:00) (82/59 - 137/76)  BP(mean): 68 (21 Feb 2021 01:00) (68 - 97)  ABP: --  ABP(mean): --  RR: 18 (21 Feb 2021 01:00) (18 - 22)  SpO2: 98% (21 Feb 2021 01:00) (92% - 100%)    I&O's Summary    19 Feb 2021 07:01  -  20 Feb 2021 07:00  --------------------------------------------------------  IN: 1330.1 mL / OUT: 1695 mL / NET: -364.9 mL    20 Feb 2021 07:01  -  21 Feb 2021 02:05  --------------------------------------------------------  IN: 1157 mL / OUT: 664 mL / NET: 493 mL      Mode: AC/ CMV (Assist Control/ Continuous Mandatory Ventilation)  RR (machine): 18  TV (machine): 450  FiO2: 40  PEEP: 5  ITime: 1  MAP: 11  PIP: 30      LABS:                        12.8   9.74  )-----------( 137      ( 20 Feb 2021 07:20 )             39.3     02-20    142  |  109<H>  |  25<H>  ----------------------------<  92  4.2   |  25  |  1.03    Ca    8.7      20 Feb 2021 07:20  Phos  2.7     02-20  Mg     1.9     02-20    TPro  6.1  /  Alb  2.0<L>  /  TBili  0.5  /  DBili  x   /  AST  29  /  ALT  33  /  AlkPhos  78  02-20        CAPILLARY BLOOD GLUCOSE      POCT Blood Glucose.: 100 mg/dL (20 Feb 2021 17:45)  POCT Blood Glucose.: 80 mg/dL (20 Feb 2021 05:47)    ABG - ( 19 Feb 2021 03:24 )  pH, Arterial: 7.36  pH, Blood: x     /  pCO2: 40    /  pO2: 107   / HCO3: 22    / Base Excess: -2.6  /  SaO2: 98                  RADIOLOGY & ADDITIONAL TESTS:    Consultant(s) Notes Reviewed:  [x ] YES  [ ] NO    MEDICATIONS  (STANDING):  aspirin  chewable 81 milliGRAM(s) Oral daily  atorvastatin 80 milliGRAM(s) Oral at bedtime  chlorhexidine 0.12% Liquid 15 milliLiter(s) Oral Mucosa every 12 hours  chlorhexidine 2% Cloths 1 Application(s) Topical <User Schedule>  dextrose 50% Injectable 25 Gram(s) IV Push once  enoxaparin Injectable 120 milliGRAM(s) SubCutaneous two times a day  HYDROmorphone Infusion 0.5 mG/Hr (0.5 mL/Hr) IV Continuous <Continuous>  insulin lispro (ADMELOG) corrective regimen sliding scale   SubCutaneous every 6 hours  lactated ringers. 1000 milliLiter(s) (75 mL/Hr) IV Continuous <Continuous>  meropenem  IVPB 1000 milliGRAM(s) IV Intermittent every 12 hours  norepinephrine Infusion 0.05 MICROgram(s)/kG/Min (5.75 mL/Hr) IV Continuous <Continuous>  pantoprazole  Injectable 40 milliGRAM(s) IV Push daily  propofol Infusion 5 MICROgram(s)/kG/Min (3.68 mL/Hr) IV Continuous <Continuous>    MEDICATIONS  (PRN):  acetaminophen    Suspension .. 650 milliGRAM(s) Oral every 6 hours PRN Temp greater or equal to 38C (100.4F), Mild Pain (1 - 3)      PHYSICAL EXAM:  GENERAL: +ETT, obese  HEAD:  Atraumatic, Normocephalic  EYES: EOMI, PERRLA, conjunctiva and sclera clear  ENT: No tonsillar erythema, exudates, or enlargement; Moist mucous membranes, Good dentition, No lesions  NECK: Supple, No JVD, Normal thyroid, no enlarged nodes  NERVOUS SYSTEM:  sedated   CHEST/LUNG: intubated B/L good air entry; No rales, rhonchi, or wheezing  HEART: S1S2 normal, no S3, Regular rate and rhythm; No murmurs, rubs, or gallops  ABDOMEN: Soft, Nontender, Nondistended; Bowel sounds present  EXTREMITIES:  2+ Peripheral Pulses, No clubbing, cyanosis, or edema  LYMPH: No lymphadenopathy noted  SKIN: No rashes or lesions    Care Discussed with Consultants/Other Providers [ x] YES  [ ] NO

## 2021-02-21 NOTE — CHART NOTE - NSCHARTNOTEFT_GEN_A_CORE
Spoke to patient's Wife, Courtney through  [id number]. Although patient's wife defers to patient's PCP, and was unable to be reached. Patient expresses that she would like physician to do everything possible to get her  healthy again. Explained that we need a central line to give blood pressure medications. Patient';s wife endorses that she trusts physician to do everything to get her  healthy again. All questions answered. Consent for central line obtained. Risks explained.

## 2021-02-21 NOTE — PROCEDURE NOTE - NSPROCDETAILS_GEN_ALL_CORE
Jered Mccoy has struggled some this month with healthy changes. Patient has gained 6 pounds. Today we discussed healthy changes in lifestyle, diet, and exercise.  Handout on mindful eating provided today.   Intensive behavioral therapy for obesity was done today.   Goals for this month are: 4 meals per day with protein at each; eat protein first, use smaller plate; exercise as advised.   Follow up in one month to see Dr. Iverson for possible submission for surgery.     
guidewire recovered/lumen(s) aspirated and flushed/sterile dressing applied/sterile technique, catheter placed/ultrasound guidance with use of sterile gel and probe cove
sterile technique, indwelling urinary device inserted

## 2021-02-21 NOTE — PROGRESS NOTE ADULT - ASSESSMENT
75 Y M from [facility] rehab, non ambulatory with PMhx of CVA( twice first in July 2020, second 3 weeks ago), HTN, HLD, Prediabetes,BPH was brought in from NH for unresponsiveness.   ED course: Pt was unresponsive so was intubated in The ED and Femoral line was placed by the ED physician   Vitals : Febrile to 103 F , , /108  Labs : wbc 6k ,lactate 1.2, Bun/CR 40/1.56  T1 0.018  ECG : NSR   CXR : clear    Pt will be admitted to ICU for Acute hypoxic Respiratory Failure and Septic shock         Assessment:  1. Acute Hypoxic Respiratory Failure     2. Acute Encephalopathy   3. Septic shock   4. CVA   5. pre-diabetes   6. HTN   7. BPH     Plan:    =====================[CNS ]==============================  # Acute Encephalopathy :    - recent stroke 3 weeks ago , alert ,oriented and follows commands as per NH papers at baseline, confused and memory loss after stroke as per wife   -  CTH no acute event ,Age-indeterminate left PCA territory infarction.     # CVA :   - L post cerebral A stroke as per NH papers   - Pt is on Aspirin, xarelto and statins   -  c/w all if no bleeding on CTH     =====================[CVS ]==============================  # Septic shock :   - likely 2/2 Urosepsis   - UA +ve   - c/w vasopressor support   -  on Meropenem 1g q12 for now , can de-escalate pending culture results   - f/u BCx ,UCx     # HTN :   - pt is on toprol, losartan and nifedipine   - Hold BP medications   - c/w vasopressors for BP support   - trend trops   - f/u Echo     =====================[RESP ]==============================  # Acute Hypoxic Resp Failure :   - Pt was intubated as pt was unresponsive   - CXR Clear   - CT chest showed Right middle lobe as well as patchy bibasilar opacities.    - c/w Mech vent     =====================[ GI ]================================  # No issues :   - NGT in place   -TF     ====================[ RENAL ]=============================   # URSULA over CKD :   - baseline Cr around 1.4   - Bun/cr 40/1.56   - FC in place   - on  gentle hydration w/LR @ 75 cc x 12hrs  - monitor urine output   - I&Os  - Monitor electrolytes     =====================[ ID ]================================  # Urosepsis :   -  febrile to 103 F, no wbc count or lactate , CXR clear   - UA +ve   - f/u BCx and UCx, PCt    - s/p 1 dose of vanco and zosyn in the ED   - will c/w empirical ABx     ===================[ HEME/ONC ]===========================  # No issues :  - Hb and Plt stable   - monitor cbc daily     =====================[ ENDO ]=============================  # Pre -diabetes : -  - Last A1c 6.2   - target CBG < 180  - FS q6 while NPO, will start on ISS   - Start diet when possible    ==================[ SKIN/ CATHETERS ]======================  # no wound or skin break   # R fem line     ==================[ PROPHYLAXIS ]==========================   # Dvt : Xarelto  # Gi : Protonix     ====================[ DISPO ]==============================   - Monitor in ICU     ===================[ PROGNOSIS ]===========================  - Guarded      75 Y M from [facility] rehab, non ambulatory with PMhx of CVA( twice first in July 2020, second 3 weeks ago), HTN, HLD, Prediabetes,BPH was brought in from NH for unresponsiveness.   ED course: Pt was unresponsive so was intubated in The ED and Femoral line was placed by the ED physician   Vitals : Febrile to 103 F , , /108  Labs : wbc 6k ,lactate 1.2, Bun/CR 40/1.56  T1 0.018  ECG : NSR   CXR : clear    Pt will be admitted to ICU for Acute hypoxic Respiratory Failure and Septic shock         Assessment:  1. Acute Hypoxic Respiratory Failure     2. Acute Encephalopathy   3. Septic shock   4. CVA   5. pre-diabetes   6. HTN   7. BPH     Plan:    =====================[CNS ]==============================  # Acute Encephalopathy :    - recent stroke 3 weeks ago , alert ,oriented and follows commands as per NH papers at baseline, confused and memory loss after stroke as per wife   -  CTH no acute event ,Age-indeterminate left PCA territory infarction.     # CVA :   - L post cerebral A stroke as per NH papers   - Pt is on Aspirin, FD lovenoxand statins        =====================[CVS ]==============================  # Septic shock :   - likely 2/2 Urosepsis   - UA +ve   - c/w vasopressor support   -  on Meropenem 1g q12 for now , can de-escalate pending culture results   - f/u BCx ,UCx   Culture with enterococcus however ID suspects contaminant? continue Meropenem and follow up ID    # HTN :   - pt is on toprol, losartan and nifedipine   - Hold BP medications   - c/w vasopressors for BP support - on levo  - f/u Echo     =====================[RESP ]==============================  # Acute Hypoxic Resp Failure :   - Pt was intubated as pt was unresponsive   - CXR Clear   - CT chest showed Right middle lobe as well as patchy bibasilar opacities.    - c/w Mech vent     =====================[ GI ]================================  # No issues :   - NGT in place   -TF     ====================[ RENAL ]=============================   # URSULA over CKD :   - baseline Cr around 1.4   - FC in place   - on  gentle hydration w/LR @ 75 cc x 12hrs  - monitor urine output   - I&Os  - Monitor electrolytes     =====================[ ID ]================================  # Urosepsis :   -  febrile to 103 F, no wbc count or lactate , CXR clear   - UA +ve   - f/u BCx and UCx, PCt    - s/p 1 dose of vanco and zosyn in the ED   - will c/w meropenem    ===================[ HEME/ONC ]===========================  # No issues :  - Hb and Plt stable   - monitor cbc daily     =====================[ ENDO ]=============================  # Pre -diabetes : -  - Last A1c 6.2   - target CBG < 180  - FS q6 while NPO, will start on ISS   - Start diet when possible    ==================[ SKIN/ CATHETERS ]======================  # no wound or skin break   # R fem line - pending removal  - LIJ placed, pending placement    ==================[ PROPHYLAXIS ]==========================   # Dvt : full dose lovenox  # Gi : Protonix     ====================[ DISPO ]==============================   - Monitor in ICU     ===================[ PROGNOSIS ]===========================  - Guarded

## 2021-02-21 NOTE — DIETITIAN INITIAL EVALUATION ADULT. - PERTINENT MEDS FT
MEDICATIONS  (STANDING):  aspirin  chewable 81 milliGRAM(s) Oral daily  atorvastatin 80 milliGRAM(s) Oral at bedtime  chlorhexidine 0.12% Liquid 15 milliLiter(s) Oral Mucosa every 12 hours  chlorhexidine 2% Cloths 1 Application(s) Topical <User Schedule>  dextrose 50% Injectable 25 Gram(s) IV Push once  enoxaparin Injectable 120 milliGRAM(s) SubCutaneous two times a day  HYDROmorphone Infusion 0.5 mG/Hr (0.5 mL/Hr) IV Continuous <Continuous>  insulin lispro (ADMELOG) corrective regimen sliding scale   SubCutaneous every 6 hours  lactated ringers. 1000 milliLiter(s) (75 mL/Hr) IV Continuous <Continuous>  meropenem  IVPB 1000 milliGRAM(s) IV Intermittent every 12 hours  midodrine 10 milliGRAM(s) Oral every 8 hours  norepinephrine Infusion 0.05 MICROgram(s)/kG/Min (5.75 mL/Hr) IV Continuous <Continuous>  pantoprazole  Injectable 40 milliGRAM(s) IV Push daily  propofol Infusion 5 MICROgram(s)/kG/Min (3.68 mL/Hr) IV Continuous <Continuous>  vancomycin  IVPB 1000 milliGRAM(s) IV Intermittent every 12 hours    MEDICATIONS  (PRN):  acetaminophen    Suspension .. 650 milliGRAM(s) Oral every 6 hours PRN Temp greater or equal to 38C (100.4F), Mild Pain (1 - 3)

## 2021-02-22 LAB
ALBUMIN SERPL ELPH-MCNC: 1.8 G/DL — LOW (ref 3.5–5)
ALBUMIN SERPL ELPH-MCNC: 2.1 G/DL — LOW (ref 3.5–5)
ALP SERPL-CCNC: 354 U/L — HIGH (ref 40–120)
ALP SERPL-CCNC: 374 U/L — HIGH (ref 40–120)
ALT FLD-CCNC: 323 U/L DA — HIGH (ref 10–60)
ALT FLD-CCNC: 368 U/L DA — HIGH (ref 10–60)
ANION GAP SERPL CALC-SCNC: 6 MMOL/L — SIGNIFICANT CHANGE UP (ref 5–17)
ANION GAP SERPL CALC-SCNC: 6 MMOL/L — SIGNIFICANT CHANGE UP (ref 5–17)
AST SERPL-CCNC: 193 U/L — HIGH (ref 10–40)
AST SERPL-CCNC: 279 U/L — HIGH (ref 10–40)
BASE EXCESS BLDA CALC-SCNC: -1 MMOL/L — SIGNIFICANT CHANGE UP (ref -2–2)
BILIRUB SERPL-MCNC: 0.9 MG/DL — SIGNIFICANT CHANGE UP (ref 0.2–1.2)
BILIRUB SERPL-MCNC: 1.1 MG/DL — SIGNIFICANT CHANGE UP (ref 0.2–1.2)
BLOOD GAS COMMENTS ARTERIAL: SIGNIFICANT CHANGE UP
BUN SERPL-MCNC: 33 MG/DL — HIGH (ref 7–18)
BUN SERPL-MCNC: 33 MG/DL — HIGH (ref 7–18)
CALCIUM SERPL-MCNC: 8.5 MG/DL — SIGNIFICANT CHANGE UP (ref 8.4–10.5)
CALCIUM SERPL-MCNC: 8.8 MG/DL — SIGNIFICANT CHANGE UP (ref 8.4–10.5)
CHLORIDE SERPL-SCNC: 110 MMOL/L — HIGH (ref 96–108)
CHLORIDE SERPL-SCNC: 110 MMOL/L — HIGH (ref 96–108)
CO2 SERPL-SCNC: 25 MMOL/L — SIGNIFICANT CHANGE UP (ref 22–31)
CO2 SERPL-SCNC: 26 MMOL/L — SIGNIFICANT CHANGE UP (ref 22–31)
CREAT SERPL-MCNC: 1.35 MG/DL — HIGH (ref 0.5–1.3)
CREAT SERPL-MCNC: 1.35 MG/DL — HIGH (ref 0.5–1.3)
GLUCOSE BLDC GLUCOMTR-MCNC: 152 MG/DL — HIGH (ref 70–99)
GLUCOSE BLDC GLUCOMTR-MCNC: 154 MG/DL — HIGH (ref 70–99)
GLUCOSE BLDC GLUCOMTR-MCNC: 158 MG/DL — HIGH (ref 70–99)
GLUCOSE BLDC GLUCOMTR-MCNC: 167 MG/DL — HIGH (ref 70–99)
GLUCOSE SERPL-MCNC: 154 MG/DL — HIGH (ref 70–99)
GLUCOSE SERPL-MCNC: 182 MG/DL — HIGH (ref 70–99)
HCO3 BLDA-SCNC: 24 MMOL/L — SIGNIFICANT CHANGE UP (ref 23–27)
HCT VFR BLD CALC: 40.9 % — SIGNIFICANT CHANGE UP (ref 39–50)
HGB BLD-MCNC: 13.2 G/DL — SIGNIFICANT CHANGE UP (ref 13–17)
HOROWITZ INDEX BLDA+IHG-RTO: 40 — SIGNIFICANT CHANGE UP
LACTATE SERPL-SCNC: 1.1 MMOL/L — SIGNIFICANT CHANGE UP (ref 0.7–2)
MAGNESIUM SERPL-MCNC: 2.2 MG/DL — SIGNIFICANT CHANGE UP (ref 1.6–2.6)
MCHC RBC-ENTMCNC: 31.6 PG — SIGNIFICANT CHANGE UP (ref 27–34)
MCHC RBC-ENTMCNC: 32.3 GM/DL — SIGNIFICANT CHANGE UP (ref 32–36)
MCV RBC AUTO: 97.8 FL — SIGNIFICANT CHANGE UP (ref 80–100)
NRBC # BLD: 0 /100 WBCS — SIGNIFICANT CHANGE UP (ref 0–0)
PCO2 BLDA: 43 MMHG — SIGNIFICANT CHANGE UP (ref 32–46)
PH BLDA: 7.36 — SIGNIFICANT CHANGE UP (ref 7.35–7.45)
PHOSPHATE SERPL-MCNC: 3.4 MG/DL — SIGNIFICANT CHANGE UP (ref 2.5–4.5)
PLATELET # BLD AUTO: 156 K/UL — SIGNIFICANT CHANGE UP (ref 150–400)
PO2 BLDA: 85 MMHG — SIGNIFICANT CHANGE UP (ref 74–108)
POTASSIUM SERPL-MCNC: 4.4 MMOL/L — SIGNIFICANT CHANGE UP (ref 3.5–5.3)
POTASSIUM SERPL-MCNC: 4.6 MMOL/L — SIGNIFICANT CHANGE UP (ref 3.5–5.3)
POTASSIUM SERPL-SCNC: 4.4 MMOL/L — SIGNIFICANT CHANGE UP (ref 3.5–5.3)
POTASSIUM SERPL-SCNC: 4.6 MMOL/L — SIGNIFICANT CHANGE UP (ref 3.5–5.3)
PROT SERPL-MCNC: 6.2 G/DL — SIGNIFICANT CHANGE UP (ref 6–8.3)
PROT SERPL-MCNC: 6.5 G/DL — SIGNIFICANT CHANGE UP (ref 6–8.3)
RBC # BLD: 4.18 M/UL — LOW (ref 4.2–5.8)
RBC # FLD: 12.1 % — SIGNIFICANT CHANGE UP (ref 10.3–14.5)
SAO2 % BLDA: 96 % — SIGNIFICANT CHANGE UP (ref 92–96)
SODIUM SERPL-SCNC: 141 MMOL/L — SIGNIFICANT CHANGE UP (ref 135–145)
SODIUM SERPL-SCNC: 142 MMOL/L — SIGNIFICANT CHANGE UP (ref 135–145)
TRIGL SERPL-MCNC: 164 MG/DL — HIGH
WBC # BLD: 5.95 K/UL — SIGNIFICANT CHANGE UP (ref 3.8–10.5)
WBC # FLD AUTO: 5.95 K/UL — SIGNIFICANT CHANGE UP (ref 3.8–10.5)

## 2021-02-22 PROCEDURE — 71045 X-RAY EXAM CHEST 1 VIEW: CPT | Mod: 26

## 2021-02-22 RX ORDER — DEXMEDETOMIDINE HYDROCHLORIDE IN 0.9% SODIUM CHLORIDE 4 UG/ML
0.5 INJECTION INTRAVENOUS
Qty: 400 | Refills: 0 | Status: DISCONTINUED | OUTPATIENT
Start: 2021-02-22 | End: 2021-02-26

## 2021-02-22 RX ORDER — ALBUMIN HUMAN 25 %
50 VIAL (ML) INTRAVENOUS EVERY 6 HOURS
Refills: 0 | Status: COMPLETED | OUTPATIENT
Start: 2021-02-22 | End: 2021-02-22

## 2021-02-22 RX ORDER — MEROPENEM 1 G/30ML
1000 INJECTION INTRAVENOUS EVERY 8 HOURS
Refills: 0 | Status: DISCONTINUED | OUTPATIENT
Start: 2021-02-22 | End: 2021-02-25

## 2021-02-22 RX ADMIN — DEXMEDETOMIDINE HYDROCHLORIDE IN 0.9% SODIUM CHLORIDE 15.1 MICROGRAM(S)/KG/HR: 4 INJECTION INTRAVENOUS at 13:46

## 2021-02-22 RX ADMIN — ENOXAPARIN SODIUM 120 MILLIGRAM(S): 100 INJECTION SUBCUTANEOUS at 17:11

## 2021-02-22 RX ADMIN — MIDODRINE HYDROCHLORIDE 10 MILLIGRAM(S): 2.5 TABLET ORAL at 04:13

## 2021-02-22 RX ADMIN — Medication 1: at 11:31

## 2021-02-22 RX ADMIN — MIDODRINE HYDROCHLORIDE 10 MILLIGRAM(S): 2.5 TABLET ORAL at 13:46

## 2021-02-22 RX ADMIN — MEROPENEM 100 MILLIGRAM(S): 1 INJECTION INTRAVENOUS at 13:45

## 2021-02-22 RX ADMIN — CHLORHEXIDINE GLUCONATE 1 APPLICATION(S): 213 SOLUTION TOPICAL at 04:11

## 2021-02-22 RX ADMIN — MEROPENEM 100 MILLIGRAM(S): 1 INJECTION INTRAVENOUS at 22:00

## 2021-02-22 RX ADMIN — CHLORHEXIDINE GLUCONATE 15 MILLILITER(S): 213 SOLUTION TOPICAL at 04:12

## 2021-02-22 RX ADMIN — ENOXAPARIN SODIUM 120 MILLIGRAM(S): 100 INJECTION SUBCUTANEOUS at 04:13

## 2021-02-22 RX ADMIN — Medication 1: at 23:58

## 2021-02-22 RX ADMIN — DEXMEDETOMIDINE HYDROCHLORIDE IN 0.9% SODIUM CHLORIDE 15.1 MICROGRAM(S)/KG/HR: 4 INJECTION INTRAVENOUS at 22:00

## 2021-02-22 RX ADMIN — Medication 1: at 17:11

## 2021-02-22 RX ADMIN — CHLORHEXIDINE GLUCONATE 15 MILLILITER(S): 213 SOLUTION TOPICAL at 17:11

## 2021-02-22 RX ADMIN — PANTOPRAZOLE SODIUM 40 MILLIGRAM(S): 20 TABLET, DELAYED RELEASE ORAL at 11:13

## 2021-02-22 RX ADMIN — Medication 50 MILLILITER(S): at 11:34

## 2021-02-22 RX ADMIN — MEROPENEM 100 MILLIGRAM(S): 1 INJECTION INTRAVENOUS at 04:12

## 2021-02-22 RX ADMIN — Medication 650 MILLIGRAM(S): at 05:46

## 2021-02-22 RX ADMIN — MIDODRINE HYDROCHLORIDE 10 MILLIGRAM(S): 2.5 TABLET ORAL at 22:00

## 2021-02-22 RX ADMIN — Medication 81 MILLIGRAM(S): at 11:13

## 2021-02-22 RX ADMIN — ATORVASTATIN CALCIUM 80 MILLIGRAM(S): 80 TABLET, FILM COATED ORAL at 22:00

## 2021-02-22 RX ADMIN — DEXMEDETOMIDINE HYDROCHLORIDE IN 0.9% SODIUM CHLORIDE 15.1 MICROGRAM(S)/KG/HR: 4 INJECTION INTRAVENOUS at 09:55

## 2021-02-22 RX ADMIN — Medication 1: at 06:41

## 2021-02-22 RX ADMIN — Medication 250 MILLIGRAM(S): at 04:12

## 2021-02-22 RX ADMIN — PROPOFOL 3.68 MICROGRAM(S)/KG/MIN: 10 INJECTION, EMULSION INTRAVENOUS at 00:29

## 2021-02-22 RX ADMIN — Medication 50 MILLILITER(S): at 17:12

## 2021-02-22 NOTE — PROGRESS NOTE ADULT - SUBJECTIVE AND OBJECTIVE BOX
Patient is a 75y old  Male who presents with a chief complaint of Unresponsive (21 Feb 2021 17:02)      INTERVAL HPI/OVERNIGHT EVENTS:  ICU Vital Signs Last 24 Hrs  T(C): 36.9 (22 Feb 2021 04:00), Max: 37.1 (22 Feb 2021 00:00)  T(F): 98.4 (22 Feb 2021 04:00), Max: 98.8 (22 Feb 2021 00:00)  HR: 62 (22 Feb 2021 08:37) (47 - 99)  BP: 103/59 (22 Feb 2021 04:00) (73/57 - 134/63)  BP(mean): 71 (22 Feb 2021 04:00) (63 - 88)  ABP: --  ABP(mean): --  RR: 20 (22 Feb 2021 04:00) (18 - 189)  SpO2: 96% (22 Feb 2021 08:37) (94% - 100%)    I&O's Summary    21 Feb 2021 07:01  -  22 Feb 2021 07:00  --------------------------------------------------------  IN: 959.4 mL / OUT: 1046 mL / NET: -86.6 mL      Mode: AC/ CMV (Assist Control/ Continuous Mandatory Ventilation)  RR (machine): 18  TV (machine): 450  FiO2: 40  PEEP: 5  ITime: 0.8  MAP: 10  PIP: 25      LABS:                        13.2   5.95  )-----------( 156      ( 22 Feb 2021 05:54 )             40.9     02-22    141  |  110<H>  |  33<H>  ----------------------------<  154<H>  4.6   |  25  |  1.35<H>    Ca    8.5      22 Feb 2021 05:54  Phos  3.4     02-22  Mg     2.2     02-22    TPro  6.2  /  Alb  1.8<L>  /  TBili  1.1  /  DBili  x   /  AST  279<H>  /  ALT  368<H>  /  AlkPhos  374<H>  02-22        CAPILLARY BLOOD GLUCOSE      POCT Blood Glucose.: 158 mg/dL (22 Feb 2021 06:42)  POCT Blood Glucose.: 135 mg/dL (21 Feb 2021 23:13)  POCT Blood Glucose.: 147 mg/dL (21 Feb 2021 18:28)    ABG - ( 22 Feb 2021 04:39 )  pH, Arterial: 7.36  pH, Blood: x     /  pCO2: 43    /  pO2: 85    / HCO3: 24    / Base Excess: -1.0  /  SaO2: 96                  RADIOLOGY & ADDITIONAL TESTS:    Consultant(s) Notes Reviewed:  [x ] YES  [ ] NO    MEDICATIONS  (STANDING):  aspirin  chewable 81 milliGRAM(s) Oral daily  atorvastatin 80 milliGRAM(s) Oral at bedtime  chlorhexidine 0.12% Liquid 15 milliLiter(s) Oral Mucosa every 12 hours  chlorhexidine 2% Cloths 1 Application(s) Topical <User Schedule>  dextrose 50% Injectable 25 Gram(s) IV Push once  enoxaparin Injectable 120 milliGRAM(s) SubCutaneous two times a day  HYDROmorphone Infusion 0.5 mG/Hr (0.5 mL/Hr) IV Continuous <Continuous>  insulin lispro (ADMELOG) corrective regimen sliding scale   SubCutaneous every 6 hours  lactated ringers. 1000 milliLiter(s) (75 mL/Hr) IV Continuous <Continuous>  meropenem  IVPB 1000 milliGRAM(s) IV Intermittent every 12 hours  midodrine 10 milliGRAM(s) Oral every 8 hours  norepinephrine Infusion 0.05 MICROgram(s)/kG/Min (5.75 mL/Hr) IV Continuous <Continuous>  pantoprazole  Injectable 40 milliGRAM(s) IV Push daily  propofol Infusion 5 MICROgram(s)/kG/Min (3.68 mL/Hr) IV Continuous <Continuous>  vancomycin  IVPB 1000 milliGRAM(s) IV Intermittent every 12 hours    MEDICATIONS  (PRN):  acetaminophen    Suspension .. 650 milliGRAM(s) Oral every 6 hours PRN Temp greater or equal to 38C (100.4F), Mild Pain (1 - 3)      PHYSICAL EXAM:  GENERAL: well built, well nourished  HEAD:  Atraumatic, Normocephalic  EYES: EOMI, PERRLA, conjunctiva and sclera clear  ENT: No tonsillar erythema, exudates, or enlargement; Moist mucous membranes, Good dentition, No lesions  NECK: Supple, No JVD, Normal thyroid, no enlarged nodes  NERVOUS SYSTEM:  Alert & Oriented X3, Good concentration; Motor Strength 5/5 B/L upper and lower extremities; DTRs 2+ intact and symmetric, sensory intact  CHEST/LUNG: B/L good air entry; No rales, rhonchi, or wheezing  HEART: S1S2 normal, no S3, Regular rate and rhythm; No murmurs, rubs, or gallops  ABDOMEN: Soft, Nontender, Nondistended; Bowel sounds present  EXTREMITIES:  2+ Peripheral Pulses, No clubbing, cyanosis, or edema  LYMPH: No lymphadenopathy noted  SKIN: No rashes or lesions    Care Discussed with Consultants/Other Providers [ x] YES  [ ] NO Patient is a 75y old  Male who presents with a chief complaint of Unresponsive (21 Feb 2021 17:02)      INTERVAL HPI/OVERNIGHT EVENTS: patient examined bed side, will dc dilaudid and change propofol to precedex for SBT , given albumin, bd culture pos for enterocci and MR coagulase neg , urine culture pos for klebsiella  ICU Vital Signs Last 24 Hrs  T(C): 36.9 (22 Feb 2021 04:00), Max: 37.1 (22 Feb 2021 00:00)  T(F): 98.4 (22 Feb 2021 04:00), Max: 98.8 (22 Feb 2021 00:00)  HR: 62 (22 Feb 2021 08:37) (47 - 99)  BP: 103/59 (22 Feb 2021 04:00) (73/57 - 134/63)  BP(mean): 71 (22 Feb 2021 04:00) (63 - 88)  ABP: --  ABP(mean): --  RR: 20 (22 Feb 2021 04:00) (18 - 189)  SpO2: 96% (22 Feb 2021 08:37) (94% - 100%)    I&O's Summary    21 Feb 2021 07:01  -  22 Feb 2021 07:00  --------------------------------------------------------  IN: 959.4 mL / OUT: 1046 mL / NET: -86.6 mL      Mode: AC/ CMV (Assist Control/ Continuous Mandatory Ventilation)  RR (machine): 18  TV (machine): 450  FiO2: 40  PEEP: 5  ITime: 0.8  MAP: 10  PIP: 25      LABS:                        13.2   5.95  )-----------( 156      ( 22 Feb 2021 05:54 )             40.9     02-22    141  |  110<H>  |  33<H>  ----------------------------<  154<H>  4.6   |  25  |  1.35<H>    Ca    8.5      22 Feb 2021 05:54  Phos  3.4     02-22  Mg     2.2     02-22    TPro  6.2  /  Alb  1.8<L>  /  TBili  1.1  /  DBili  x   /  AST  279<H>  /  ALT  368<H>  /  AlkPhos  374<H>  02-22        CAPILLARY BLOOD GLUCOSE      POCT Blood Glucose.: 158 mg/dL (22 Feb 2021 06:42)  POCT Blood Glucose.: 135 mg/dL (21 Feb 2021 23:13)  POCT Blood Glucose.: 147 mg/dL (21 Feb 2021 18:28)    ABG - ( 22 Feb 2021 04:39 )  pH, Arterial: 7.36  pH, Blood: x     /  pCO2: 43    /  pO2: 85    / HCO3: 24    / Base Excess: -1.0  /  SaO2: 96                  RADIOLOGY & ADDITIONAL TESTS:    Consultant(s) Notes Reviewed:  [x ] YES  [ ] NO    MEDICATIONS  (STANDING):  aspirin  chewable 81 milliGRAM(s) Oral daily  atorvastatin 80 milliGRAM(s) Oral at bedtime  chlorhexidine 0.12% Liquid 15 milliLiter(s) Oral Mucosa every 12 hours  chlorhexidine 2% Cloths 1 Application(s) Topical <User Schedule>  dextrose 50% Injectable 25 Gram(s) IV Push once  enoxaparin Injectable 120 milliGRAM(s) SubCutaneous two times a day  HYDROmorphone Infusion 0.5 mG/Hr (0.5 mL/Hr) IV Continuous <Continuous>  insulin lispro (ADMELOG) corrective regimen sliding scale   SubCutaneous every 6 hours  lactated ringers. 1000 milliLiter(s) (75 mL/Hr) IV Continuous <Continuous>  meropenem  IVPB 1000 milliGRAM(s) IV Intermittent every 12 hours  midodrine 10 milliGRAM(s) Oral every 8 hours  norepinephrine Infusion 0.05 MICROgram(s)/kG/Min (5.75 mL/Hr) IV Continuous <Continuous>  pantoprazole  Injectable 40 milliGRAM(s) IV Push daily  propofol Infusion 5 MICROgram(s)/kG/Min (3.68 mL/Hr) IV Continuous <Continuous>  vancomycin  IVPB 1000 milliGRAM(s) IV Intermittent every 12 hours    MEDICATIONS  (PRN):  acetaminophen    Suspension .. 650 milliGRAM(s) Oral every 6 hours PRN Temp greater or equal to 38C (100.4F), Mild Pain (1 - 3)      PHYSICAL EXAM:  GENERAL: obese , +ETT   HEAD:  Atraumatic, Normocephalic  EYES: EOMI, PERRLA, conjunctiva and sclera clear  ENT: No tonsillar erythema, exudates, or enlargement; Moist mucous membranes, Good dentition, No lesions  NECK: Supple, No JVD, Normal thyroid, no enlarged nodes  NERVOUS SYSTEM:  sedated   CHEST/LUNG: B/L good air entry; No rales, rhonchi, or wheezing  HEART: S1S2 normal, no S3, Regular rate and rhythm; No murmurs, rubs, or gallops  ABDOMEN: Soft, Nontender, Nondistended; Bowel sounds present  EXTREMITIES:  2+ Peripheral Pulses,oedema in lower extremities and scrotal oedema.   LYMPH: No lymphadenopathy noted  SKIN: No rashes or lesions    Care Discussed with Consultants/Other Providers [ x] YES  [ ] NO Patient is a 75y old  Male who presents with a chief complaint of Unresponsive (21 Feb 2021 17:02)      INTERVAL HPI/OVERNIGHT EVENTS: patient examined bed side, will dc dilaudid and change propofol to precedex for SBT , given albumin, bd culture pos for enterocci and MR coagulase neg , urine culture pos for klebsiella, vancomycin is dced, meropenem increased to q 8   ICU Vital Signs Last 24 Hrs  T(C): 36.9 (22 Feb 2021 04:00), Max: 37.1 (22 Feb 2021 00:00)  T(F): 98.4 (22 Feb 2021 04:00), Max: 98.8 (22 Feb 2021 00:00)  HR: 62 (22 Feb 2021 08:37) (47 - 99)  BP: 103/59 (22 Feb 2021 04:00) (73/57 - 134/63)  BP(mean): 71 (22 Feb 2021 04:00) (63 - 88)  ABP: --  ABP(mean): --  RR: 20 (22 Feb 2021 04:00) (18 - 189)  SpO2: 96% (22 Feb 2021 08:37) (94% - 100%)    I&O's Summary    21 Feb 2021 07:01  -  22 Feb 2021 07:00  --------------------------------------------------------  IN: 959.4 mL / OUT: 1046 mL / NET: -86.6 mL      Mode: AC/ CMV (Assist Control/ Continuous Mandatory Ventilation)  RR (machine): 18  TV (machine): 450  FiO2: 40  PEEP: 5  ITime: 0.8  MAP: 10  PIP: 25      LABS:                        13.2   5.95  )-----------( 156      ( 22 Feb 2021 05:54 )             40.9     02-22    141  |  110<H>  |  33<H>  ----------------------------<  154<H>  4.6   |  25  |  1.35<H>    Ca    8.5      22 Feb 2021 05:54  Phos  3.4     02-22  Mg     2.2     02-22    TPro  6.2  /  Alb  1.8<L>  /  TBili  1.1  /  DBili  x   /  AST  279<H>  /  ALT  368<H>  /  AlkPhos  374<H>  02-22        CAPILLARY BLOOD GLUCOSE      POCT Blood Glucose.: 158 mg/dL (22 Feb 2021 06:42)  POCT Blood Glucose.: 135 mg/dL (21 Feb 2021 23:13)  POCT Blood Glucose.: 147 mg/dL (21 Feb 2021 18:28)    ABG - ( 22 Feb 2021 04:39 )  pH, Arterial: 7.36  pH, Blood: x     /  pCO2: 43    /  pO2: 85    / HCO3: 24    / Base Excess: -1.0  /  SaO2: 96                  RADIOLOGY & ADDITIONAL TESTS:    Consultant(s) Notes Reviewed:  [x ] YES  [ ] NO    MEDICATIONS  (STANDING):  aspirin  chewable 81 milliGRAM(s) Oral daily  atorvastatin 80 milliGRAM(s) Oral at bedtime  chlorhexidine 0.12% Liquid 15 milliLiter(s) Oral Mucosa every 12 hours  chlorhexidine 2% Cloths 1 Application(s) Topical <User Schedule>  dextrose 50% Injectable 25 Gram(s) IV Push once  enoxaparin Injectable 120 milliGRAM(s) SubCutaneous two times a day  HYDROmorphone Infusion 0.5 mG/Hr (0.5 mL/Hr) IV Continuous <Continuous>  insulin lispro (ADMELOG) corrective regimen sliding scale   SubCutaneous every 6 hours  lactated ringers. 1000 milliLiter(s) (75 mL/Hr) IV Continuous <Continuous>  meropenem  IVPB 1000 milliGRAM(s) IV Intermittent every 12 hours  midodrine 10 milliGRAM(s) Oral every 8 hours  norepinephrine Infusion 0.05 MICROgram(s)/kG/Min (5.75 mL/Hr) IV Continuous <Continuous>  pantoprazole  Injectable 40 milliGRAM(s) IV Push daily  propofol Infusion 5 MICROgram(s)/kG/Min (3.68 mL/Hr) IV Continuous <Continuous>  vancomycin  IVPB 1000 milliGRAM(s) IV Intermittent every 12 hours    MEDICATIONS  (PRN):  acetaminophen    Suspension .. 650 milliGRAM(s) Oral every 6 hours PRN Temp greater or equal to 38C (100.4F), Mild Pain (1 - 3)      PHYSICAL EXAM:  GENERAL: obese , +ETT   HEAD:  Atraumatic, Normocephalic  EYES: EOMI, PERRLA, conjunctiva and sclera clear  ENT: No tonsillar erythema, exudates, or enlargement; Moist mucous membranes, Good dentition, No lesions  NECK: Supple, No JVD, Normal thyroid, no enlarged nodes  NERVOUS SYSTEM:  sedated   CHEST/LUNG: B/L good air entry; No rales, rhonchi, or wheezing  HEART: S1S2 normal, no S3, Regular rate and rhythm; No murmurs, rubs, or gallops  ABDOMEN: Soft, Nontender, Nondistended; Bowel sounds present  EXTREMITIES:  2+ Peripheral Pulses,oedema in lower extremities and scrotal oedema.   LYMPH: No lymphadenopathy noted  SKIN: No rashes or lesions    Care Discussed with Consultants/Other Providers [ x] YES  [ ] NO

## 2021-02-22 NOTE — CHART NOTE - NSCHARTNOTEFT_GEN_A_CORE
patient wife Courtney [phone] was contacted via polish  ID [phone] to give the updates , but she did not  the phone, left her voice message via polish . patient wife Courtney [phone] was contacted via polish  ID [phone] to give the updates , but she did not  the phone, left her voice message via polish .  update : the patient PCP called for updates and was given the updates as per wife wishes.

## 2021-02-22 NOTE — PROGRESS NOTE ADULT - ATTENDING COMMENTS
IMP: This is a 75 yr old man from [facility], non ambulatory with  CVA( twice first in July 2020, second 3 weeks ago), HTN, HLD, Prediabetes,BPH was brought in from NH for unresponsiveness.   ED course: Pt was unresponsive so was intubated in The ED and Femoral line was placed by the ED physician       Assessment:  1. Acute Hypoxic Respiratory Failure     2. Acute Encephalopathy   3. Septic shock   4. CVA   5. pre-diabetes   6. HTN   7. BPH     Plan;  -continue vent support  -adjust vent as per ABG  -sedated and pain control   -hemodynamic support  -monitor blood sugar   -pressors to maintain MAP>65  -monitor biomarkers daily  -change to lovenox therapeutic .

## 2021-02-22 NOTE — PROGRESS NOTE ADULT - ASSESSMENT
75 Y M from [facility] rehab, non ambulatory with PMhx of CVA( twice first in July 2020, second 3 weeks ago), HTN, HLD, Prediabetes,BPH was brought in from NH for unresponsiveness.   ED course: Pt was unresponsive so was intubated in The ED and Femoral line was placed by the ED physician   Vitals : Febrile to 103 F , , /108  Labs : wbc 6k ,lactate 1.2, Bun/CR 40/1.56  T1 0.018  ECG : NSR   CXR : clear    Pt will be admitted to ICU for Acute hypoxic Respiratory Failure and Septic shock         Assessment:  1. Acute Hypoxic Respiratory Failure     2. Acute Encephalopathy   3. Septic shock   4. CVA   5. pre-diabetes   6. HTN   7. BPH     Plan:    =====================[CNS ]==============================  # Acute Encephalopathy :    - recent stroke 3 weeks ago , alert ,oriented and follows commands as per NH papers at baseline, confused and memory loss after stroke as per wife   -  CTH no acute event ,Age-indeterminate left PCA territory infarction.     # CVA :   - L post cerebral A stroke as per NH papers   - Pt is on Aspirin, FD lovenoxand statins        =====================[CVS ]==============================  # Septic shock :   - likely 2/2 Urosepsis   - UA +ve   - c/w vasopressor support   -  on Meropenem 1g q12 for now , can de-escalate pending culture results   - f/u BCx ,UCx   Culture with enterococcus however ID suspects contaminant? continue Meropenem and follow up ID    # HTN :   - pt is on toprol, losartan and nifedipine   - Hold BP medications   - c/w vasopressors for BP support - on levo  - f/u Echo     =====================[RESP ]==============================  # Acute Hypoxic Resp Failure :   - Pt was intubated as pt was unresponsive   - CXR Clear   - CT chest showed Right middle lobe as well as patchy bibasilar opacities.    - c/w Mech vent     =====================[ GI ]================================  # No issues :   - NGT in place   -TF     ====================[ RENAL ]=============================   # URSULA over CKD :   - baseline Cr around 1.4   - FC in place   - on  gentle hydration w/LR @ 75 cc x 12hrs  - monitor urine output   - I&Os  - Monitor electrolytes     =====================[ ID ]================================  # Urosepsis :   -  febrile to 103 F, no wbc count or lactate , CXR clear   - UA +ve   - f/u BCx and UCx, PCt    - s/p 1 dose of vanco and zosyn in the ED   - will c/w meropenem    ===================[ HEME/ONC ]===========================  # No issues :  - Hb and Plt stable   - monitor cbc daily     =====================[ ENDO ]=============================  # Pre -diabetes : -  - Last A1c 6.2   - target CBG < 180  - FS q6 while NPO, will start on ISS   - Start diet when possible    ==================[ SKIN/ CATHETERS ]======================  # no wound or skin break   # R fem line - pending removal  - LIJ placed, pending placement    ==================[ PROPHYLAXIS ]==========================   # Dvt : full dose lovenox  # Gi : Protonix     ====================[ DISPO ]==============================   - Monitor in ICU     ===================[ PROGNOSIS ]===========================  - Guarded      75 Y M from [facility] rehab, non ambulatory with PMhx of CVA( twice first in July 2020, second 3 weeks ago), HTN, HLD, Prediabetes,BPH was brought in from NH for unresponsiveness.   ED course: Pt was unresponsive so was intubated in The ED and Femoral line was placed by the ED physician   Vitals : Febrile to 103 F , , /108  Labs : wbc 6k ,lactate 1.2, Bun/CR 40/1.56  T1 0.018  ECG : NSR   CXR : clear    Pt will be admitted to ICU for Acute hypoxic Respiratory Failure and Septic shock         Assessment:  1. Acute Hypoxic Respiratory Failure     2. Acute Encephalopathy   3. Septic shock   4. CVA   5. pre-diabetes   6. HTN   7. BPH     Plan:    =====================[CNS ]==============================  # Acute Encephalopathy :    - recent stroke 3 weeks ago , alert ,oriented and follows commands as per NH papers at baseline, confused and memory loss after stroke as per wife   -  CTH no acute event ,Age-indeterminate left PCA territory infarction.  -will dc dilaudid and propofol      # CVA :   - L post cerebral A stroke as per NH papers   - Pt is on Aspirin, FD lovenox and statins        =====================[CVS ]==============================  # Septic shock :   - likely 2/2 Urosepsis   - UA +ve   - c/w vasopressor support   -  on Meropenem 1g q12 for now , vanc  - f/u BCx pos to enterococci and CONS  ,UCx pos to klebsiella  -f/u repeat cultures      # HTN :   - pt is on toprol, losartan and nifedipine   - Hold BP medications   - c/w vasopressors for BP support - on levo  -  Echo grade 1 diastolic dysfuction,    =====================[RESP ]==============================  # Acute Hypoxic Resp Failure :   - Pt was intubated as pt was unresponsive   - CXR Clear   - CT chest showed Right middle lobe as well as patchy bibasilar opacities.    - c/w Mech vent     =====================[ GI ]================================  # No issues :   - NGT in place   -TF     ====================[ RENAL ]=============================   # URSULA over CKD :   - baseline Cr around 1.4   - FC in place   - monitor urine output   - I&Os  - Monitor electrolytes   -given albumin    =====================[ ID ]================================  # Urosepsis :   -  febrile to 103 F, no wbc count or lactate , CXR clear   - UA +ve   urine culture klebsiella  - s/p 1 dose of vanco and zosyn in the ED   - will c/w meropenem and vanc   f/u ID     ===================[ HEME/ONC ]===========================  # No issues :  - Hb and Plt stable   - monitor cbc daily     =====================[ ENDO ]=============================  # Pre -diabetes : -  - Last A1c 6.2   - target CBG < 180  - FS q6 while NPO, will start on ISS       ==================[ SKIN/ CATHETERS ]======================  # no wound or skin break   # R fem line -  - LIJ placed,  ==================[ PROPHYLAXIS ]==========================   # Dvt : full dose lovenox  # Gi : Protonix     ====================[ DISPO ]==============================   - Monitor in ICU     ===================[ PROGNOSIS ]===========================  - Guarded

## 2021-02-22 NOTE — PROGRESS NOTE ADULT - SUBJECTIVE AND OBJECTIVE BOX
Patient is a 75y old  Male who presents with a chief complaint of Unresponsive (22 Feb 2021 08:42)/AMS/metabolic encephaloapthy/septic hock/shock liver/acute hypoxic respiratory failure/intubation/pyelonephritis(?)/aspiration HCAP      INTERVAL HPI/OVERNIGHT EVENTS:  T(C): 35.9 (02-22-21 @ 07:00), Max: 37.1 (02-22-21 @ 00:00)  HR: 55 (02-22-21 @ 10:00) (47 - 99)  BP: 122/67 (02-22-21 @ 10:00) (73/57 - 134/63)  RR: 18 (02-22-21 @ 10:00) (18 - 189)  SpO2: 97% (02-22-21 @ 10:00) (94% - 100%)  Wt(kg): --    LABS:                        13.2   5.95  )-----------( 156      ( 22 Feb 2021 05:54 )             40.9     02-22    141  |  110<H>  |  33<H>  ----------------------------<  154<H>  4.6   |  25  |  1.35<H>    Ca    8.5      22 Feb 2021 05:54  Phos  3.4     02-22  Mg     2.2     02-22    TPro  6.2  /  Alb  1.8<L>  /  TBili  1.1  /  DBili  x   /  AST  279<H>  /  ALT  368<H>  /  AlkPhos  374<H>  02-22        CAPILLARY BLOOD GLUCOSE      POCT Blood Glucose.: 158 mg/dL (22 Feb 2021 06:42)  POCT Blood Glucose.: 135 mg/dL (21 Feb 2021 23:13)  POCT Blood Glucose.: 147 mg/dL (21 Feb 2021 18:28)    ABG - ( 22 Feb 2021 04:39 )  pH, Arterial: 7.36  pH, Blood: x     /  pCO2: 43    /  pO2: 85    / HCO3: 24    / Base Excess: -1.0  /  SaO2: 96                  RADIOLOGY & ADDITIONAL TESTS:    Consultant(s) Notes Reviewed:  [x ] YES  [ ] NO    PHYSICAL EXAM:  GENERAL: well built, well nourished  HEAD:  Atraumatic, Normocephalic  EYES: EOMI, PERRLA, conjunctiva and sclera clear  ENT: No tonsillar erythema, exudates, or enlargement; Moist mucous membranes, Good dentition, on vent/NG tube  NECK: Supple, No JVD, Normal thyroid, no enlarged nodes  NERVOUS SYSTEM:  sedated   CHEST/LUNG: B/L good air entry; No rales, rhonchi, or wheezing  HEART: S1S2 normal, no S3, Regular rate and rhythm; No murmurs, rubs, or gallops  ABDOMEN: Soft, Nontender, Nondistended; Bowel sounds present  EXTREMITIES:  2+ Peripheral Pulses, No clubbing, cyanosis, positive  edema  LYMPH: No lymphadenopathy noted  SKIN: No rashes or lesions    Care Discussed with Consultants/Other Providers [ x] YES  [ ] NO

## 2021-02-22 NOTE — PROGRESS NOTE ADULT - SUBJECTIVE AND OBJECTIVE BOX
ICU visit:  75y Male is under our care for septic shock, aspiration pneumonia with respiratory failure, bacteremia, and ?UTI. Patient remains intubated, sedated and on one pressor. No recurrent fevers for the past 24 hours. +BC for 1/4 bottles with enterococcus faecalis and staph hominis (contaminant), repeat BC are negative.    REVIEW OF SYSTEMS:  [ X ] Not able to illicit    MEDS:  meropenem  IVPB 1000 milliGRAM(s) IV Intermittent every 12 hours  norepinephrine Infusion 0.05 MICROgram(s)/kG/Min (5.75 mL/Hr) IV Continuous <Continuous>  vancomycin  IVPB 1000 milliGRAM(s) IV Intermittent every 12 hours    ALLERGIES: Allergies    No Known Allergies    Intolerances      VITALS:  ICU Vital Signs Last 24 Hrs  T(C): 35.9 (22 Feb 2021 17:00), Max: 37.1 (22 Feb 2021 00:00)  T(F): 96.7 (22 Feb 2021 17:00), Max: 98.8 (22 Feb 2021 00:00)  HR: 70 (22 Feb 2021 21:00) (46 - 99)  BP: 105/57 (22 Feb 2021 21:00) (69/44 - 174/65)  BP(mean): 72 (22 Feb 2021 21:00) (53 - 94)  ABP: --  ABP(mean): --  RR: 18 (22 Feb 2021 21:00) (18 - 189)  SpO2: 100% (22 Feb 2021 21:00) (94% - 100%)      PHYSICAL EXAM:  HEENT: +vent via ETT +salem sump  Neck: supple no LN's, left neck CVP  Respiratory: bilateral diminished breath sounds no rales  Cardiovascular: S1 S2 geovany no murmurs  Gastrointestinal: +BS with soft, large abdominal pannus; no grimacing of patient when abdomen was palpated  +jackson  Extremities: no edema  Skin: +anasarca  Ortho: n/a  Neuro: sedated      LABS/DIAGNOSTIC TESTS:                        13.2   5.95  )-----------( 156      ( 22 Feb 2021 05:54 )             40.9     WBC Count: 5.95 K/uL (02-22 @ 05:54)  WBC Count: 5.54 K/uL (02-21 @ 05:53)  WBC Count: 9.74 K/uL (02-20 @ 07:20)  WBC Count: 13.34 K/uL (02-19 @ 06:43)  WBC Count: 6.37 K/uL (02-18 @ 20:34)    02-22    142  |  110<H>  |  33<H>  ----------------------------<  182<H>  4.4   |  26  |  1.35<H> 1.27 < 1.03    Ca    8.8      22 Feb 2021 16:48  Phos  3.4     02-22  Mg     2.2     02-22    TPro  6.5  /  Alb  2.1<L>  /  TBili  0.9  /  DBili  x   /  AST  193<H>  /  ALT  323<H>  /  AlkPhos  354<H>  02-22    Lactate, Blood: 1.1 mmol/L (02-22 @ 05:54)    ABG - ( 22 Feb 2021 04:39 )  pH: 7.36  /  pCO2: 43    /  pO2: 85    / HCO3: 24    / Base Excess: -1.0  /  SaO2: 96            CULTURES:   .Blood Blood-Peripheral  02-21 @ 12:05   No growth to date.  --  --      .Blood Blood-Peripheral  02-21 @ 12:04   No growth to date.  --  --      .Blood Blood-Peripheral  02-19 @ 03:00   Growth in anaerobic bottle: Enterococcus faecalis  ***Blood Panel PCR results on this specimen are available  approximately 3 hours after the Gram stain result.***  Gram stain, PCR, and/or culture results may not always  correspond due to difference in methodologies.  ************************************************************  This PCR assay was performed by multiplex PCR. This  Assay tests for 66 bacterial and resistance gene targets.  Please refer to the [facility] test directory  at [email or web address] for details.  --  Blood Culture PCR  Enterococcus faecalis      .Urine Clean Catch (Midstream)  02-19 @ 02:34   50,000 - 99,000 CFU/mL Klebsiella pneumoniae  --  Klebsiella pneumoniae        RADIOLOGY:  no new studies

## 2021-02-22 NOTE — PROGRESS NOTE ADULT - ASSESSMENT
75 yr old male fom NH, H/O CVA recent/dementia/HTN/BPH, admit hospital for unresponsive, intubated at ER, CT chest/ABD showed PNA/aspiration(?)/blood culture positive, start pressor for septic shock//IV ABS, F/U pan culture result, ICU care, close monitor lab, titrate pressor, elevated LFT, shock liver(?), close monitor LFT , continue ICU care, vent support, DX unresponsive/acute hypoxic respiratory failure/intubatiob/septic shock/aspiration PNA/shock liver/pyelonephritis/bacteremia. Will give albumin today, titrate pressor. continue ICU care, remain  full code.

## 2021-02-22 NOTE — PROGRESS NOTE ADULT - ASSESSMENT
Septic Shock  Pneumonia with respiratory failure - likely aspiration  Fevers - none today  Bacteremia   ?UTI  ·	increased Meropenem 1 gm iv to q8hrs  ·	DC Vancomycin  Time spent - 33 mins Septic Shock  Pneumonia with respiratory failure - likely aspiration  Fevers - none today  Bacteremia - likely contaminants , meropenem will cover them as well.  ?UTI  ·	increased Meropenem 1 gm iv to q8hrs  ·	DC Vancomycin  Time spent - 33 mins

## 2021-02-23 DIAGNOSIS — J96.01 ACUTE RESPIRATORY FAILURE WITH HYPOXIA: ICD-10-CM

## 2021-02-23 DIAGNOSIS — A41.9 SEPSIS, UNSPECIFIED ORGANISM: ICD-10-CM

## 2021-02-23 DIAGNOSIS — F01.50 VASCULAR DEMENTIA WITHOUT BEHAVIORAL DISTURBANCE: ICD-10-CM

## 2021-02-23 DIAGNOSIS — Z51.5 ENCOUNTER FOR PALLIATIVE CARE: ICD-10-CM

## 2021-02-23 DIAGNOSIS — E43 UNSPECIFIED SEVERE PROTEIN-CALORIE MALNUTRITION: ICD-10-CM

## 2021-02-23 DIAGNOSIS — R53.81 OTHER MALAISE: ICD-10-CM

## 2021-02-23 LAB
ALBUMIN SERPL ELPH-MCNC: 2.1 G/DL — LOW (ref 3.5–5)
ALP SERPL-CCNC: 310 U/L — HIGH (ref 40–120)
ALT FLD-CCNC: 254 U/L DA — HIGH (ref 10–60)
ANION GAP SERPL CALC-SCNC: 6 MMOL/L — SIGNIFICANT CHANGE UP (ref 5–17)
AST SERPL-CCNC: 122 U/L — HIGH (ref 10–40)
BASE EXCESS BLDA CALC-SCNC: 3 MMOL/L — HIGH (ref -2–2)
BILIRUB SERPL-MCNC: 0.8 MG/DL — SIGNIFICANT CHANGE UP (ref 0.2–1.2)
BLOOD GAS COMMENTS ARTERIAL: SIGNIFICANT CHANGE UP
BUN SERPL-MCNC: 33 MG/DL — HIGH (ref 7–18)
CALCIUM SERPL-MCNC: 8.7 MG/DL — SIGNIFICANT CHANGE UP (ref 8.4–10.5)
CHLORIDE SERPL-SCNC: 111 MMOL/L — HIGH (ref 96–108)
CO2 SERPL-SCNC: 28 MMOL/L — SIGNIFICANT CHANGE UP (ref 22–31)
CREAT SERPL-MCNC: 1.12 MG/DL — SIGNIFICANT CHANGE UP (ref 0.5–1.3)
GLUCOSE BLDC GLUCOMTR-MCNC: 119 MG/DL — HIGH (ref 70–99)
GLUCOSE BLDC GLUCOMTR-MCNC: 138 MG/DL — HIGH (ref 70–99)
GLUCOSE BLDC GLUCOMTR-MCNC: 142 MG/DL — HIGH (ref 70–99)
GLUCOSE SERPL-MCNC: 154 MG/DL — HIGH (ref 70–99)
HCO3 BLDA-SCNC: 27 MMOL/L — SIGNIFICANT CHANGE UP (ref 23–27)
HCT VFR BLD CALC: 38.5 % — LOW (ref 39–50)
HGB BLD-MCNC: 12.8 G/DL — LOW (ref 13–17)
HOROWITZ INDEX BLDA+IHG-RTO: 40 — SIGNIFICANT CHANGE UP
INR BLD: 1.28 RATIO — HIGH (ref 0.88–1.16)
MAGNESIUM SERPL-MCNC: 2.3 MG/DL — SIGNIFICANT CHANGE UP (ref 1.6–2.6)
MCHC RBC-ENTMCNC: 31.7 PG — SIGNIFICANT CHANGE UP (ref 27–34)
MCHC RBC-ENTMCNC: 33.2 GM/DL — SIGNIFICANT CHANGE UP (ref 32–36)
MCV RBC AUTO: 95.3 FL — SIGNIFICANT CHANGE UP (ref 80–100)
NRBC # BLD: 0 /100 WBCS — SIGNIFICANT CHANGE UP (ref 0–0)
PCO2 BLDA: 40 MMHG — SIGNIFICANT CHANGE UP (ref 32–46)
PH BLDA: 7.44 — SIGNIFICANT CHANGE UP (ref 7.35–7.45)
PHOSPHATE SERPL-MCNC: 2.8 MG/DL — SIGNIFICANT CHANGE UP (ref 2.5–4.5)
PLATELET # BLD AUTO: 149 K/UL — LOW (ref 150–400)
PO2 BLDA: 84 MMHG — SIGNIFICANT CHANGE UP (ref 74–108)
POTASSIUM SERPL-MCNC: 4.4 MMOL/L — SIGNIFICANT CHANGE UP (ref 3.5–5.3)
POTASSIUM SERPL-SCNC: 4.4 MMOL/L — SIGNIFICANT CHANGE UP (ref 3.5–5.3)
PROT SERPL-MCNC: 6.4 G/DL — SIGNIFICANT CHANGE UP (ref 6–8.3)
PROTHROM AB SERPL-ACNC: 15.1 SEC — HIGH (ref 10.6–13.6)
RAPID RVP RESULT: SIGNIFICANT CHANGE UP
RBC # BLD: 4.04 M/UL — LOW (ref 4.2–5.8)
RBC # FLD: 11.9 % — SIGNIFICANT CHANGE UP (ref 10.3–14.5)
SAO2 % BLDA: 96 % — SIGNIFICANT CHANGE UP (ref 92–96)
SARS-COV-2 RNA SPEC QL NAA+PROBE: SIGNIFICANT CHANGE UP
SODIUM SERPL-SCNC: 145 MMOL/L — SIGNIFICANT CHANGE UP (ref 135–145)
TRIGL SERPL-MCNC: 123 MG/DL — SIGNIFICANT CHANGE UP
WBC # BLD: 5.98 K/UL — SIGNIFICANT CHANGE UP (ref 3.8–10.5)
WBC # FLD AUTO: 5.98 K/UL — SIGNIFICANT CHANGE UP (ref 3.8–10.5)

## 2021-02-23 PROCEDURE — 71045 X-RAY EXAM CHEST 1 VIEW: CPT | Mod: 26

## 2021-02-23 PROCEDURE — 99222 1ST HOSP IP/OBS MODERATE 55: CPT

## 2021-02-23 RX ADMIN — DEXMEDETOMIDINE HYDROCHLORIDE IN 0.9% SODIUM CHLORIDE 15.1 MICROGRAM(S)/KG/HR: 4 INJECTION INTRAVENOUS at 10:11

## 2021-02-23 RX ADMIN — DEXMEDETOMIDINE HYDROCHLORIDE IN 0.9% SODIUM CHLORIDE 15.1 MICROGRAM(S)/KG/HR: 4 INJECTION INTRAVENOUS at 04:58

## 2021-02-23 RX ADMIN — DEXMEDETOMIDINE HYDROCHLORIDE IN 0.9% SODIUM CHLORIDE 15.1 MICROGRAM(S)/KG/HR: 4 INJECTION INTRAVENOUS at 19:07

## 2021-02-23 RX ADMIN — ENOXAPARIN SODIUM 120 MILLIGRAM(S): 100 INJECTION SUBCUTANEOUS at 05:23

## 2021-02-23 RX ADMIN — MIDODRINE HYDROCHLORIDE 10 MILLIGRAM(S): 2.5 TABLET ORAL at 05:23

## 2021-02-23 RX ADMIN — MIDODRINE HYDROCHLORIDE 10 MILLIGRAM(S): 2.5 TABLET ORAL at 20:37

## 2021-02-23 RX ADMIN — MEROPENEM 100 MILLIGRAM(S): 1 INJECTION INTRAVENOUS at 20:40

## 2021-02-23 RX ADMIN — CHLORHEXIDINE GLUCONATE 1 APPLICATION(S): 213 SOLUTION TOPICAL at 05:24

## 2021-02-23 RX ADMIN — MEROPENEM 100 MILLIGRAM(S): 1 INJECTION INTRAVENOUS at 05:23

## 2021-02-23 RX ADMIN — MIDODRINE HYDROCHLORIDE 10 MILLIGRAM(S): 2.5 TABLET ORAL at 13:21

## 2021-02-23 RX ADMIN — ATORVASTATIN CALCIUM 80 MILLIGRAM(S): 80 TABLET, FILM COATED ORAL at 20:39

## 2021-02-23 RX ADMIN — PANTOPRAZOLE SODIUM 40 MILLIGRAM(S): 20 TABLET, DELAYED RELEASE ORAL at 10:10

## 2021-02-23 RX ADMIN — MEROPENEM 100 MILLIGRAM(S): 1 INJECTION INTRAVENOUS at 13:21

## 2021-02-23 RX ADMIN — CHLORHEXIDINE GLUCONATE 15 MILLILITER(S): 213 SOLUTION TOPICAL at 17:19

## 2021-02-23 RX ADMIN — CHLORHEXIDINE GLUCONATE 15 MILLILITER(S): 213 SOLUTION TOPICAL at 05:24

## 2021-02-23 RX ADMIN — Medication 81 MILLIGRAM(S): at 10:10

## 2021-02-23 RX ADMIN — ENOXAPARIN SODIUM 120 MILLIGRAM(S): 100 INJECTION SUBCUTANEOUS at 17:18

## 2021-02-23 NOTE — CONSULT NOTE ADULT - PROBLEM SELECTOR RECOMMENDATION 4
2/2 acute and chronic illnesses. Patient with albumin 2.1. Remains NPO due to critical status. High risk of skin breakdown.

## 2021-02-23 NOTE — PROGRESS NOTE ADULT - ATTENDING COMMENTS
IMP: This is a 75 yr old man from [facility], non ambulatory with  CVA( twice first in July 2020, second 3 weeks ago), HTN, HLD, Prediabetes,BPH was brought in from NH for unresponsiveness.   ED course: Pt was unresponsive so was intubated in The ED and Femoral line was placed by the ED physician       Assessment:  1. Acute Hypoxic Respiratory Failure     2. Acute Encephalopathy   3. Septic shock   4. CVA   5. pre-diabetes   6. HTN   7. BPH     Plan;  -continue vent support  -adjust vent as per ABG  -sedated and pain control   -hemodynamic support  -monitor blood sugar   -pressors to maintain MAP>65  -monitor biomarkers daily  -change to lovenox therapeutic .

## 2021-02-23 NOTE — PROGRESS NOTE ADULT - ASSESSMENT
75 yr old male fom NH, H/O CVA recent/dementia/HTN/BPH, admit hospital for unresponsive, intubated at ER, CT chest/ABD showed PNA/aspiration(?)/blood culture positive, start pressor for septic shock//IV ABS, F/U pan culture result, ICU care, close monitor lab, titrate pressor, elevated LFT, shock liver(?), close monitor LFT , continue ICU care, vent support, DX unresponsive/acute hypoxic respiratory failure/intubatiob/septic shock/aspiration PNA/shock liver/pyelonephritis/bacteremia. Will give albumin today, titrate pressor. continue ICU care, remain  full code. Palliative consult, discuss with family goal of care.

## 2021-02-23 NOTE — CHART NOTE - NSCHARTNOTEFT_GEN_A_CORE
Patient's wife/Ashish ([phone]) , the primary surrogate was contacted by [employer]  ID [phone]. patient did not  the phone , left her a voice message via [employer] . Patient's wife/Ashish ([phone]) , the primary surrogate was contacted by [employer]  ID [phone]. patient did not  the phone , left her a voice message via polish .  the patient PCP called and asked to get the updates as per wife wishes , updates were given for today and asked him to let the wife call back to tell about her wishes herself.

## 2021-02-23 NOTE — PROGRESS NOTE ADULT - SUBJECTIVE AND OBJECTIVE BOX
Patient is a 75y old  Male who presents with a chief complaint of Unresponsive (22 Feb 2021 13:10)      INTERVAL HPI/OVERNIGHT EVENTS:  ICU Vital Signs Last 24 Hrs  T(C): 36.3 (23 Feb 2021 04:00), Max: 36.4 (23 Feb 2021 00:00)  T(F): 97.4 (23 Feb 2021 04:00), Max: 97.5 (23 Feb 2021 00:00)  HR: 74 (23 Feb 2021 08:00) (46 - 74)  BP: 105/66 (23 Feb 2021 08:00) (69/44 - 174/65)  BP(mean): 79 (23 Feb 2021 08:00) (53 - 94)  ABP: --  ABP(mean): --  RR: 19 (23 Feb 2021 08:00) (18 - 20)  SpO2: 94% (23 Feb 2021 08:00) (94% - 100%)    I&O's Summary    22 Feb 2021 07:01  -  23 Feb 2021 07:00  --------------------------------------------------------  IN: 1264.1 mL / OUT: 900 mL / NET: 364.1 mL      Mode: AC/ CMV (Assist Control/ Continuous Mandatory Ventilation)  RR (machine): 18  TV (machine): 450  FiO2: 40  PEEP: 5  ITime: 0.8  MAP: 10  PIP: 26      LABS:                        12.8   5.98  )-----------( 149      ( 23 Feb 2021 04:58 )             38.5     02-23    145  |  111<H>  |  33<H>  ----------------------------<  154<H>  4.4   |  28  |  1.12    Ca    8.7      23 Feb 2021 04:58  Phos  2.8     02-23  Mg     2.3     02-23    TPro  6.4  /  Alb  2.1<L>  /  TBili  0.8  /  DBili  x   /  AST  122<H>  /  ALT  254<H>  /  AlkPhos  310<H>  02-23    PT/INR - ( 23 Feb 2021 04:58 )   PT: 15.1 sec;   INR: 1.28 ratio             CAPILLARY BLOOD GLUCOSE      POCT Blood Glucose.: 142 mg/dL (23 Feb 2021 06:35)  POCT Blood Glucose.: 152 mg/dL (22 Feb 2021 23:56)  POCT Blood Glucose.: 154 mg/dL (22 Feb 2021 17:03)  POCT Blood Glucose.: 167 mg/dL (22 Feb 2021 11:22)    ABG - ( 23 Feb 2021 04:17 )  pH, Arterial: 7.44  pH, Blood: x     /  pCO2: 40    /  pO2: 84    / HCO3: 27    / Base Excess: 3.0   /  SaO2: 96                  RADIOLOGY & ADDITIONAL TESTS:    Consultant(s) Notes Reviewed:  [x ] YES  [ ] NO    MEDICATIONS  (STANDING):  aspirin  chewable 81 milliGRAM(s) Oral daily  atorvastatin 80 milliGRAM(s) Oral at bedtime  chlorhexidine 0.12% Liquid 15 milliLiter(s) Oral Mucosa every 12 hours  chlorhexidine 2% Cloths 1 Application(s) Topical <User Schedule>  dexMEDEtomidine Infusion 0.5 MICROgram(s)/kG/Hr (15.1 mL/Hr) IV Continuous <Continuous>  dextrose 50% Injectable 25 Gram(s) IV Push once  enoxaparin Injectable 120 milliGRAM(s) SubCutaneous two times a day  insulin lispro (ADMELOG) corrective regimen sliding scale   SubCutaneous every 6 hours  lactated ringers. 1000 milliLiter(s) (75 mL/Hr) IV Continuous <Continuous>  meropenem  IVPB 1000 milliGRAM(s) IV Intermittent every 8 hours  midodrine 10 milliGRAM(s) Oral every 8 hours  norepinephrine Infusion 0.05 MICROgram(s)/kG/Min (5.75 mL/Hr) IV Continuous <Continuous>  pantoprazole  Injectable 40 milliGRAM(s) IV Push daily    MEDICATIONS  (PRN):  acetaminophen    Suspension .. 650 milliGRAM(s) Oral every 6 hours PRN Temp greater or equal to 38C (100.4F), Mild Pain (1 - 3)      PHYSICAL EXAM:  GENERAL: well built, well nourished  HEAD:  Atraumatic, Normocephalic  EYES: EOMI, PERRLA, conjunctiva and sclera clear  ENT: No tonsillar erythema, exudates, or enlargement; Moist mucous membranes, Good dentition, No lesions  NECK: Supple, No JVD, Normal thyroid, no enlarged nodes  NERVOUS SYSTEM:  Alert & Oriented X3, Good concentration; Motor Strength 5/5 B/L upper and lower extremities; DTRs 2+ intact and symmetric, sensory intact  CHEST/LUNG: B/L good air entry; No rales, rhonchi, or wheezing  HEART: S1S2 normal, no S3, Regular rate and rhythm; No murmurs, rubs, or gallops  ABDOMEN: Soft, Nontender, Nondistended; Bowel sounds present  EXTREMITIES:  2+ Peripheral Pulses, No clubbing, cyanosis, or edema  LYMPH: No lymphadenopathy noted  SKIN: No rashes or lesions    Care Discussed with Consultants/Other Providers [ x] YES  [ ] NO Patient is a 75y old  Male who presents with a chief complaint of Unresponsive (22 Feb 2021 13:10)      INTERVAL HPI/OVERNIGHT EVENT: patient examined bed side, no acute events overnight , will try SBT , repeat blood culture neg , creat improved.   ICU Vital Signs Last 24 Hrs  T(C): 36.3 (23 Feb 2021 04:00), Max: 36.4 (23 Feb 2021 00:00)  T(F): 97.4 (23 Feb 2021 04:00), Max: 97.5 (23 Feb 2021 00:00)  HR: 74 (23 Feb 2021 08:00) (46 - 74)  BP: 105/66 (23 Feb 2021 08:00) (69/44 - 174/65)  BP(mean): 79 (23 Feb 2021 08:00) (53 - 94)  ABP: --  ABP(mean): --  RR: 19 (23 Feb 2021 08:00) (18 - 20)  SpO2: 94% (23 Feb 2021 08:00) (94% - 100%)    I&O's Summary    22 Feb 2021 07:01  -  23 Feb 2021 07:00  --------------------------------------------------------  IN: 1264.1 mL / OUT: 900 mL / NET: 364.1 mL      Mode: AC/ CMV (Assist Control/ Continuous Mandatory Ventilation)  RR (machine): 18  TV (machine): 450  FiO2: 40  PEEP: 5  ITime: 0.8  MAP: 10  PIP: 26      LABS:                        12.8   5.98  )-----------( 149      ( 23 Feb 2021 04:58 )             38.5     02-23    145  |  111<H>  |  33<H>  ----------------------------<  154<H>  4.4   |  28  |  1.12    Ca    8.7      23 Feb 2021 04:58  Phos  2.8     02-23  Mg     2.3     02-23    TPro  6.4  /  Alb  2.1<L>  /  TBili  0.8  /  DBili  x   /  AST  122<H>  /  ALT  254<H>  /  AlkPhos  310<H>  02-23    PT/INR - ( 23 Feb 2021 04:58 )   PT: 15.1 sec;   INR: 1.28 ratio             CAPILLARY BLOOD GLUCOSE      POCT Blood Glucose.: 142 mg/dL (23 Feb 2021 06:35)  POCT Blood Glucose.: 152 mg/dL (22 Feb 2021 23:56)  POCT Blood Glucose.: 154 mg/dL (22 Feb 2021 17:03)  POCT Blood Glucose.: 167 mg/dL (22 Feb 2021 11:22)    ABG - ( 23 Feb 2021 04:17 )  pH, Arterial: 7.44  pH, Blood: x     /  pCO2: 40    /  pO2: 84    / HCO3: 27    / Base Excess: 3.0   /  SaO2: 96                  RADIOLOGY & ADDITIONAL TESTS:    Consultant(s) Notes Reviewed:  [x ] YES  [ ] NO    MEDICATIONS  (STANDING):  aspirin  chewable 81 milliGRAM(s) Oral daily  atorvastatin 80 milliGRAM(s) Oral at bedtime  chlorhexidine 0.12% Liquid 15 milliLiter(s) Oral Mucosa every 12 hours  chlorhexidine 2% Cloths 1 Application(s) Topical <User Schedule>  dexMEDEtomidine Infusion 0.5 MICROgram(s)/kG/Hr (15.1 mL/Hr) IV Continuous <Continuous>  dextrose 50% Injectable 25 Gram(s) IV Push once  enoxaparin Injectable 120 milliGRAM(s) SubCutaneous two times a day  insulin lispro (ADMELOG) corrective regimen sliding scale   SubCutaneous every 6 hours  lactated ringers. 1000 milliLiter(s) (75 mL/Hr) IV Continuous <Continuous>  meropenem  IVPB 1000 milliGRAM(s) IV Intermittent every 8 hours  midodrine 10 milliGRAM(s) Oral every 8 hours  norepinephrine Infusion 0.05 MICROgram(s)/kG/Min (5.75 mL/Hr) IV Continuous <Continuous>  pantoprazole  Injectable 40 milliGRAM(s) IV Push daily    MEDICATIONS  (PRN):  acetaminophen    Suspension .. 650 milliGRAM(s) Oral every 6 hours PRN Temp greater or equal to 38C (100.4F), Mild Pain (1 - 3)      PHYSICAL EXAM:  GENERAL: obese , +ETT   HEAD:  Atraumatic, Normocephalic  EYES: EOMI, PERRLA, conjunctiva and sclera clear  ENT: No tonsillar erythema, exudates, or enlargement; Moist mucous membranes, Good dentition, No lesions  NECK: Supple, No JVD, Normal thyroid, no enlarged nodes  NERVOUS SYSTEM: sedated   CHEST/LUNG: B/L good air entry; No rales, rhonchi, or wheezing  HEART: S1S2 normal, no S3, Regular rate and rhythm; No murmurs, rubs, or gallops  ABDOMEN: Soft, Nontender, Bowel sounds present, +distended   EXTREMITIES:  2+ Peripheral Pulses, bilateral LEs oedema and scrotal oedema.   LYMPH: No lymphadenopathy noted  SKIN: No rashes or lesions    Care Discussed with Consultants/Other Providers [ x] YES  [ ] NO Patient is a 75y old  Male who presents with a chief complaint of Unresponsive (22 Feb 2021 13:10)      INTERVAL HPI/OVERNIGHT EVENT: Patient examined bed side, no acute events overnight , will try SBT , repeat blood culture neg , creat improved.   ICU Vital Signs Last 24 Hrs  T(C): 36.3 (23 Feb 2021 04:00), Max: 36.4 (23 Feb 2021 00:00)  T(F): 97.4 (23 Feb 2021 04:00), Max: 97.5 (23 Feb 2021 00:00)  HR: 74 (23 Feb 2021 08:00) (46 - 74)  BP: 105/66 (23 Feb 2021 08:00) (69/44 - 174/65)  BP(mean): 79 (23 Feb 2021 08:00) (53 - 94)  ABP: --  ABP(mean): --  RR: 19 (23 Feb 2021 08:00) (18 - 20)  SpO2: 94% (23 Feb 2021 08:00) (94% - 100%)    I&O's Summary    22 Feb 2021 07:01  -  23 Feb 2021 07:00  --------------------------------------------------------  IN: 1264.1 mL / OUT: 900 mL / NET: 364.1 mL      Mode: AC/ CMV (Assist Control/ Continuous Mandatory Ventilation)  RR (machine): 18  TV (machine): 450  FiO2: 40  PEEP: 5  ITime: 0.8  MAP: 10  PIP: 26      LABS:                        12.8   5.98  )-----------( 149      ( 23 Feb 2021 04:58 )             38.5     02-23    145  |  111<H>  |  33<H>  ----------------------------<  154<H>  4.4   |  28  |  1.12    Ca    8.7      23 Feb 2021 04:58  Phos  2.8     02-23  Mg     2.3     02-23    TPro  6.4  /  Alb  2.1<L>  /  TBili  0.8  /  DBili  x   /  AST  122<H>  /  ALT  254<H>  /  AlkPhos  310<H>  02-23    PT/INR - ( 23 Feb 2021 04:58 )   PT: 15.1 sec;   INR: 1.28 ratio             CAPILLARY BLOOD GLUCOSE      POCT Blood Glucose.: 142 mg/dL (23 Feb 2021 06:35)  POCT Blood Glucose.: 152 mg/dL (22 Feb 2021 23:56)  POCT Blood Glucose.: 154 mg/dL (22 Feb 2021 17:03)  POCT Blood Glucose.: 167 mg/dL (22 Feb 2021 11:22)    ABG - ( 23 Feb 2021 04:17 )  pH, Arterial: 7.44  pH, Blood: x     /  pCO2: 40    /  pO2: 84    / HCO3: 27    / Base Excess: 3.0   /  SaO2: 96                  RADIOLOGY & ADDITIONAL TESTS:    Consultant(s) Notes Reviewed:  [x ] YES  [ ] NO    MEDICATIONS  (STANDING):  aspirin  chewable 81 milliGRAM(s) Oral daily  atorvastatin 80 milliGRAM(s) Oral at bedtime  chlorhexidine 0.12% Liquid 15 milliLiter(s) Oral Mucosa every 12 hours  chlorhexidine 2% Cloths 1 Application(s) Topical <User Schedule>  dexMEDEtomidine Infusion 0.5 MICROgram(s)/kG/Hr (15.1 mL/Hr) IV Continuous <Continuous>  dextrose 50% Injectable 25 Gram(s) IV Push once  enoxaparin Injectable 120 milliGRAM(s) SubCutaneous two times a day  insulin lispro (ADMELOG) corrective regimen sliding scale   SubCutaneous every 6 hours  lactated ringers. 1000 milliLiter(s) (75 mL/Hr) IV Continuous <Continuous>  meropenem  IVPB 1000 milliGRAM(s) IV Intermittent every 8 hours  midodrine 10 milliGRAM(s) Oral every 8 hours  norepinephrine Infusion 0.05 MICROgram(s)/kG/Min (5.75 mL/Hr) IV Continuous <Continuous>  pantoprazole  Injectable 40 milliGRAM(s) IV Push daily    MEDICATIONS  (PRN):  acetaminophen    Suspension .. 650 milliGRAM(s) Oral every 6 hours PRN Temp greater or equal to 38C (100.4F), Mild Pain (1 - 3)      PHYSICAL EXAM:  GENERAL: obese , +ETT   HEAD:  Atraumatic, Normocephalic  EYES: EOMI, PERRLA, conjunctiva and sclera clear  ENT: No tonsillar erythema, exudates, or enlargement; Moist mucous membranes, Good dentition, No lesions  NECK: Supple, No JVD, Normal thyroid, no enlarged nodes  NERVOUS SYSTEM: sedated   CHEST/LUNG: B/L good air entry; No rales, rhonchi, or wheezing  HEART: S1S2 normal, no S3, Regular rate and rhythm; No murmurs, rubs, or gallops  ABDOMEN: Soft, Nontender, Bowel sounds present, +distended   EXTREMITIES:  2+ Peripheral Pulses, bilateral LEs oedema and scrotal oedema.   LYMPH: No lymphadenopathy noted  SKIN: No rashes or lesions    Care Discussed with Consultants/Other Providers [ x] YES  [ ] NO Patient is a 75y old  Male who presents with a chief complaint of Unresponsive (22 Feb 2021 13:10)      INTERVAL HPI/OVERNIGHT EVENT: Patient examined bed side, no acute events overnight ,  SBT failed as patient was tachypneic, repeat blood culture neg , creat improved.   ICU Vital Signs Last 24 Hrs  T(C): 36.3 (23 Feb 2021 04:00), Max: 36.4 (23 Feb 2021 00:00)  T(F): 97.4 (23 Feb 2021 04:00), Max: 97.5 (23 Feb 2021 00:00)  HR: 74 (23 Feb 2021 08:00) (46 - 74)  BP: 105/66 (23 Feb 2021 08:00) (69/44 - 174/65)  BP(mean): 79 (23 Feb 2021 08:00) (53 - 94)  ABP: --  ABP(mean): --  RR: 19 (23 Feb 2021 08:00) (18 - 20)  SpO2: 94% (23 Feb 2021 08:00) (94% - 100%)    I&O's Summary    22 Feb 2021 07:01  -  23 Feb 2021 07:00  --------------------------------------------------------  IN: 1264.1 mL / OUT: 900 mL / NET: 364.1 mL      Mode: AC/ CMV (Assist Control/ Continuous Mandatory Ventilation)  RR (machine): 18  TV (machine): 450  FiO2: 40  PEEP: 5  ITime: 0.8  MAP: 10  PIP: 26      LABS:                        12.8   5.98  )-----------( 149      ( 23 Feb 2021 04:58 )             38.5     02-23    145  |  111<H>  |  33<H>  ----------------------------<  154<H>  4.4   |  28  |  1.12    Ca    8.7      23 Feb 2021 04:58  Phos  2.8     02-23  Mg     2.3     02-23    TPro  6.4  /  Alb  2.1<L>  /  TBili  0.8  /  DBili  x   /  AST  122<H>  /  ALT  254<H>  /  AlkPhos  310<H>  02-23    PT/INR - ( 23 Feb 2021 04:58 )   PT: 15.1 sec;   INR: 1.28 ratio             CAPILLARY BLOOD GLUCOSE      POCT Blood Glucose.: 142 mg/dL (23 Feb 2021 06:35)  POCT Blood Glucose.: 152 mg/dL (22 Feb 2021 23:56)  POCT Blood Glucose.: 154 mg/dL (22 Feb 2021 17:03)  POCT Blood Glucose.: 167 mg/dL (22 Feb 2021 11:22)    ABG - ( 23 Feb 2021 04:17 )  pH, Arterial: 7.44  pH, Blood: x     /  pCO2: 40    /  pO2: 84    / HCO3: 27    / Base Excess: 3.0   /  SaO2: 96                  RADIOLOGY & ADDITIONAL TESTS:    Consultant(s) Notes Reviewed:  [x ] YES  [ ] NO    MEDICATIONS  (STANDING):  aspirin  chewable 81 milliGRAM(s) Oral daily  atorvastatin 80 milliGRAM(s) Oral at bedtime  chlorhexidine 0.12% Liquid 15 milliLiter(s) Oral Mucosa every 12 hours  chlorhexidine 2% Cloths 1 Application(s) Topical <User Schedule>  dexMEDEtomidine Infusion 0.5 MICROgram(s)/kG/Hr (15.1 mL/Hr) IV Continuous <Continuous>  dextrose 50% Injectable 25 Gram(s) IV Push once  enoxaparin Injectable 120 milliGRAM(s) SubCutaneous two times a day  insulin lispro (ADMELOG) corrective regimen sliding scale   SubCutaneous every 6 hours  lactated ringers. 1000 milliLiter(s) (75 mL/Hr) IV Continuous <Continuous>  meropenem  IVPB 1000 milliGRAM(s) IV Intermittent every 8 hours  midodrine 10 milliGRAM(s) Oral every 8 hours  norepinephrine Infusion 0.05 MICROgram(s)/kG/Min (5.75 mL/Hr) IV Continuous <Continuous>  pantoprazole  Injectable 40 milliGRAM(s) IV Push daily    MEDICATIONS  (PRN):  acetaminophen    Suspension .. 650 milliGRAM(s) Oral every 6 hours PRN Temp greater or equal to 38C (100.4F), Mild Pain (1 - 3)      PHYSICAL EXAM:  GENERAL: obese , +ETT   HEAD:  Atraumatic, Normocephalic  EYES: EOMI, PERRLA, conjunctiva and sclera clear  ENT: No tonsillar erythema, exudates, or enlargement; Moist mucous membranes, Good dentition, No lesions  NECK: Supple, No JVD, Normal thyroid, no enlarged nodes  NERVOUS SYSTEM: sedated   CHEST/LUNG: B/L good air entry; No rales, rhonchi, or wheezing  HEART: S1S2 normal, no S3, Regular rate and rhythm; No murmurs, rubs, or gallops  ABDOMEN: Soft, Nontender, Bowel sounds present, +distended   EXTREMITIES:  2+ Peripheral Pulses, bilateral LEs oedema and scrotal oedema.   LYMPH: No lymphadenopathy noted  SKIN: No rashes or lesions    Care Discussed with Consultants/Other Providers [ x] YES  [ ] NO

## 2021-02-23 NOTE — PROGRESS NOTE ADULT - SUBJECTIVE AND OBJECTIVE BOX
ICU VISIT  75y Male    Meds:  meropenem  IVPB 1000 milliGRAM(s) IV Intermittent every 8 hours    Allergies    No Known Allergies    Intolerances        VITALS:  Vital Signs Last 24 Hrs  T(C): 37.2 (23 Feb 2021 10:00), Max: 37.2 (23 Feb 2021 10:00)  T(F): 98.9 (23 Feb 2021 10:00), Max: 98.9 (23 Feb 2021 10:00)  HR: 81 (23 Feb 2021 18:00) (52 - 81)  BP: 116/78 (23 Feb 2021 17:00) (80/56 - 139/70)  BP(mean): 90 (23 Feb 2021 17:00) (65 - 90)  RR: 21 (23 Feb 2021 18:00) (18 - 22)  SpO2: 95% (23 Feb 2021 18:00) (92% - 100%)    LABS/DIAGNOSTIC TESTS:                          12.8   5.98  )-----------( 149      ( 23 Feb 2021 04:58 )             38.5         02-23    145  |  111<H>  |  33<H>  ----------------------------<  154<H>  4.4   |  28  |  1.12    Ca    8.7      23 Feb 2021 04:58  Phos  2.8     02-23  Mg     2.3     02-23    TPro  6.4  /  Alb  2.1<L>  /  TBili  0.8  /  DBili  x   /  AST  122<H>  /  ALT  254<H>  /  AlkPhos  310<H>  02-23      LIVER FUNCTIONS - ( 23 Feb 2021 04:58 )  Alb: 2.1 g/dL / Pro: 6.4 g/dL / ALK PHOS: 310 U/L / ALT: 254 U/L DA / AST: 122 U/L / GGT: x             CULTURES: .Blood Blood-Peripheral  02-21 @ 12:05   No growth to date.  --  --      .Blood Blood-Peripheral  02-21 @ 12:04   No growth to date.  --  --      .Blood Blood-Peripheral  02-19 @ 03:00   Growth in anaerobic bottle: Enterococcus faecalis      .Urine Clean Catch (Midstream)  02-19 @ 02:34   50,000 - 99,000 CFU/mL Klebsiella pneumoniae  --  Klebsiella pneumoniae            RADIOLOGY:< from: Xray Chest 1 View- PORTABLE-Routine (Xray Chest 1 View- PORTABLE-Routine in AM.) (02.23.21 @ 09:33) >  EXAM:  XR CHEST PORTABLE ROUTINE 1V                            PROCEDURE DATE:  02/23/2021          INTERPRETATION:  CLINICAL STATEMENT: Follow-up chest pain.    TECHNIQUE: AP view of the chest.    COMPARISON: 2/22/2021    FINDINGS/  IMPRESSION:  Study limited due to rotation.    ET tube, feeding tube, left central line again noted. No pneumothorax.    Mild increased interstitial lung markings.    Heart size cannot be accurately assessed in this projection.              VINH THOMAS MD; AttendingRadiologist  This document has been electronically signed. Feb 23 2021  4:17PM    < end of copied text >        ROS:  [  ] UNABLE TO ELICIT ICU VISIT  75y Male who remains in the ICU , he remains sedated , intubated and vent dependent and on 1 pressor, he has thick tracheal secretions still. No fevers or Leukocytosis , no overnight events. His repeat blood cultures are negative to date.    Meds:  meropenem  IVPB 1000 milliGRAM(s) IV Intermittent every 8 hours    Allergies    No Known Allergies    Intolerances        VITALS:  Vital Signs Last 24 Hrs  T(C): 37.2 (23 Feb 2021 10:00), Max: 37.2 (23 Feb 2021 10:00)  T(F): 98.9 (23 Feb 2021 10:00), Max: 98.9 (23 Feb 2021 10:00)  HR: 81 (23 Feb 2021 18:00) (52 - 81)  BP: 116/78 (23 Feb 2021 17:00) (80/56 - 139/70)  BP(mean): 90 (23 Feb 2021 17:00) (65 - 90)  RR: 21 (23 Feb 2021 18:00) (18 - 22)  SpO2: 95% (23 Feb 2021 18:00) (92% - 100%)    LABS/DIAGNOSTIC TESTS:                          12.8   5.98  )-----------( 149      ( 23 Feb 2021 04:58 )             38.5         02-23    145  |  111<H>  |  33<H>  ----------------------------<  154<H>  4.4   |  28  |  1.12    Ca    8.7      23 Feb 2021 04:58  Phos  2.8     02-23  Mg     2.3     02-23    TPro  6.4  /  Alb  2.1<L>  /  TBili  0.8  /  DBili  x   /  AST  122<H>  /  ALT  254<H>  /  AlkPhos  310<H>  02-23      LIVER FUNCTIONS - ( 23 Feb 2021 04:58 )  Alb: 2.1 g/dL / Pro: 6.4 g/dL / ALK PHOS: 310 U/L / ALT: 254 U/L DA / AST: 122 U/L / GGT: x             CULTURES: .Blood Blood-Peripheral  02-21 @ 12:05   No growth to date.  --  --      .Blood Blood-Peripheral  02-21 @ 12:04   No growth to date.  --  --      .Blood Blood-Peripheral  02-19 @ 03:00   Growth in anaerobic bottle: Enterococcus faecalis      .Urine Clean Catch (Midstream)  02-19 @ 02:34   50,000 - 99,000 CFU/mL Klebsiella pneumoniae  --  Klebsiella pneumoniae            RADIOLOGY:< from: Xray Chest 1 View- PORTABLE-Routine (Xray Chest 1 View- PORTABLE-Routine in AM.) (02.23.21 @ 09:33) >  EXAM:  XR CHEST PORTABLE ROUTINE 1V                            PROCEDURE DATE:  02/23/2021          INTERPRETATION:  CLINICAL STATEMENT: Follow-up chest pain.    TECHNIQUE: AP view of the chest.    COMPARISON: 2/22/2021    FINDINGS/  IMPRESSION:  Study limited due to rotation.    ET tube, feeding tube, left central line again noted. No pneumothorax.    Mild increased interstitial lung markings.    Heart size cannot be accurately assessed in this projection.              VINH THOMAS MD; AttendingRadiologist  This document has been electronically signed. Feb 23 2021  4:17PM    < end of copied text >        ROS:  [ x ] UNABLE TO ELICIT

## 2021-02-23 NOTE — CONSULT NOTE ADULT - PROBLEM SELECTOR RECOMMENDATION 6
See GOC section above. Patient's wife/Ashish ([phone]) is primary surrogate. Patient is a FULL CODE.

## 2021-02-23 NOTE — CONSULT NOTE ADULT - CONVERSATION DETAILS
2/23/21: Utilized [employer]  ID#[id number]. Spoke with wife on the phone. Attempted to discuss establishing a more efficient communication plan as she also wants patient's PCP to be involved and encouraged establishing communication with both wife and PCP simultaneously to address patient's status and any questions/concerns together. However, wife reported she makes decisions and hung up the phone.

## 2021-02-23 NOTE — CONSULT NOTE ADULT - ATTENDING COMMENTS
74 y/o M with hx CVA x2, nonambulatory, NH resident, under ICU care for acute respiratory failure/septic shock 2/2 Aspiration PNA/bacteremia. Wife is surrogate. Remains FULL CODE at this time. Palliative will follow for support and ongoing GOC discussions pending clinical course.

## 2021-02-23 NOTE — PROGRESS NOTE ADULT - ASSESSMENT
Septic Shock  Pneumonia with respiratory failure - likely aspiration  Fevers - resolved  Bacteremia - likely contaminants , meropenem will cover them as well.  ?UTI  ·	Cont Meropenem 1 gm iv q 8hrs  Time spent - 31 mins

## 2021-02-23 NOTE — CONSULT NOTE ADULT - PROBLEM SELECTOR RECOMMENDATION 5
Patient s/p 2 CVAs (1st one reported July 2020 and 2nd approx 3 weeks ago). Patient from [facility] - per report, is nonambulatory, confused/+ memory loss. Left message for facility to obtain additional information. Patient requires 24 hr care. Patient s/p 2 CVAs (1st one reported July 2020 and 2nd approx 3 weeks ago). Patient from [facility] - per report, is nonambulatory, alert/follows commands with +confused/memory loss. Left message for facility to obtain additional information. Patient requires 24 hr care.

## 2021-02-23 NOTE — CONSULT NOTE ADULT - PROBLEM SELECTOR RECOMMENDATION 9
2/2 shock due to aspiration PNA/bacteremia. 2/22 CXR indicated better aeration right lung base; no new consolidation. Patient remains sedated/intubated, on IV abx, pressor + midodrine. Remains NPO. Wife/Jadniga is primary surrogate. Patient is a FULL CODE.

## 2021-02-23 NOTE — CONSULT NOTE ADULT - PROBLEM SELECTOR RECOMMENDATION 2
2/2 aspiration PNA/bacteremia. 2/22 CXR indicated better aeration right lung base; no new consolidation. Patient remains sedated/intubated, on IV abx, pressor + midodrine. Remains NPO. Patient is a FULL CODE.

## 2021-02-23 NOTE — CONSULT NOTE ADULT - PROBLEM SELECTOR RECOMMENDATION 3
FAST 7C? Per report, patient s/p 2 CVAs (1st one July 2020, 2nd one approx 3 weeks ago); from [facility], non-ambulatory with reported confusion and memory loss. Left message for facility to obtain additional information. Patient requires 24 hr care. FAST 7C? Per report, patient s/p 2 CVAs (1st one July 2020, 2nd one approx 3 weeks ago); from [facility], non-ambulatory, alert/follows commands with  reported confusion and memory loss. Left message for facility to obtain additional information. Patient requires 24 hr care.

## 2021-02-23 NOTE — PROGRESS NOTE ADULT - SUBJECTIVE AND OBJECTIVE BOX
Patient is a 75y old  Male who presents with a chief complaint of Unresponsive (23 Feb 2021 08:25)/septic shock/metabolic encephalopathy/shock liver, palliative consult      INTERVAL HPI/OVERNIGHT EVENTS:  T(C): 37.2 (02-23-21 @ 10:00), Max: 37.2 (02-23-21 @ 10:00)  HR: 76 (02-23-21 @ 09:00) (46 - 76)  BP: 89/59 (02-23-21 @ 10:00) (69/44 - 174/65)  RR: 21 (02-23-21 @ 10:00) (18 - 22)  SpO2: 94% (02-23-21 @ 10:00) (93% - 100%)  Wt(kg): --    LABS:                        12.8   5.98  )-----------( 149      ( 23 Feb 2021 04:58 )             38.5     02-23    145  |  111<H>  |  33<H>  ----------------------------<  154<H>  4.4   |  28  |  1.12    Ca    8.7      23 Feb 2021 04:58  Phos  2.8     02-23  Mg     2.3     02-23    TPro  6.4  /  Alb  2.1<L>  /  TBili  0.8  /  DBili  x   /  AST  122<H>  /  ALT  254<H>  /  AlkPhos  310<H>  02-23    PT/INR - ( 23 Feb 2021 04:58 )   PT: 15.1 sec;   INR: 1.28 ratio             CAPILLARY BLOOD GLUCOSE      POCT Blood Glucose.: 142 mg/dL (23 Feb 2021 06:35)  POCT Blood Glucose.: 152 mg/dL (22 Feb 2021 23:56)  POCT Blood Glucose.: 154 mg/dL (22 Feb 2021 17:03)  POCT Blood Glucose.: 167 mg/dL (22 Feb 2021 11:22)    ABG - ( 23 Feb 2021 04:17 )  pH, Arterial: 7.44  pH, Blood: x     /  pCO2: 40    /  pO2: 84    / HCO3: 27    / Base Excess: 3.0   /  SaO2: 96                  RADIOLOGY & ADDITIONAL TESTS:    Consultant(s) Notes Reviewed:  [x ] YES  [ ] NO    PHYSICAL EXAM:  GENERAL: well built, well nourished  HEAD:  Atraumatic, Normocephalic  EYES: EOMI, PERRLA, conjunctiva and sclera clear  ENT: No tonsillar erythema, exudates, or enlargement; Moist mucous membranes, Good dentition, No lesions, on vent/NG tube in place  NECK: Supple, No JVD, Normal thyroid, no enlarged nodes  NERVOUS SYSTEM:  Alert & Oriented X3, Good concentration; Motor Strength 5/5 B/L upper and lower extremities; DTRs 2+ intact and symmetric, sensory intact  CHEST/LUNG: B/L good air entry; No rales, rhonchi, or wheezing  HEART: S1S2 normal, no S3, Regular rate and rhythm; No murmurs, rubs, or gallops  ABDOMEN: Soft, Nontender, Nondistended; Bowel sounds present  EXTREMITIES:  2+ Peripheral Pulses, No clubbing, cyanosis, positive  edema  LYMPH: No lymphadenopathy noted  SKIN: No rashes or lesions    Care Discussed with Consultants/Other Providers [ x] YES  [ ] NO

## 2021-02-23 NOTE — CONSULT NOTE ADULT - SUBJECTIVE AND OBJECTIVE BOX
HPI:  75 [facility] with PMhx of CVA x 2 (first in July 2020, second approx 3 weeks ago), HTN, HLD, Prediabetes, BPH was brought in from NH for unresponsiveness. As per NH papers, pt was lethargic and became unresponsive so EMS was called. Per wife, patient was confused and with memory loss after the CVA when he was admitted to [facility] and then discharged to Rehab on 2/8/21. Pt was walking with walker after the first stroke but now non-ambulatory. No other history available. GOC : FULL CODE  (18 Feb 2021 22:53)      PAST MEDICAL & SURGICAL HISTORY:  BPH (benign prostatic hyperplasia)  Hyperlipidemia  Hypertension  Diabetes  Stroke  7/2020 , 1/2021  S/P cholecystectomy    SOCIAL HISTORY:    Admitted from: [place] [facility];   Substance abuse history:  none           Tobacco hx:  none                Alcohol hx:  none           Home Opioid hx:  Scientologist:  Sikh                                  Preferred Language: Polish     Surrogate/HCP/Guardian:  Courtney Dudley (wife): [phone]    FAMILY HISTORY: Unable to obtain 2/2 mental status    Baseline ADLs (prior to admission): Per report, patient nonambulatory, +confusion and memory loss. Left message for facility to obtain additional information.    No Known Allergies    Present Symptoms:    Review of Systems:  Unable to obtain due to poor mentation - patient sedated/intubated     MEDICATIONS  (STANDING):  aspirin  chewable 81 milliGRAM(s) Oral daily  atorvastatin 80 milliGRAM(s) Oral at bedtime  chlorhexidine 0.12% Liquid 15 milliLiter(s) Oral Mucosa every 12 hours  chlorhexidine 2% Cloths 1 Application(s) Topical <User Schedule>  dexMEDEtomidine Infusion 0.5 MICROgram(s)/kG/Hr (15.1 mL/Hr) IV Continuous <Continuous>  dextrose 50% Injectable 25 Gram(s) IV Push once  enoxaparin Injectable 120 milliGRAM(s) SubCutaneous two times a day  insulin lispro (ADMELOG) corrective regimen sliding scale   SubCutaneous every 6 hours  lactated ringers. 1000 milliLiter(s) (75 mL/Hr) IV Continuous <Continuous>  meropenem  IVPB 1000 milliGRAM(s) IV Intermittent every 8 hours  midodrine 10 milliGRAM(s) Oral every 8 hours  norepinephrine Infusion 0.05 MICROgram(s)/kG/Min (5.75 mL/Hr) IV Continuous <Continuous>  pantoprazole  Injectable 40 milliGRAM(s) IV Push daily    MEDICATIONS  (PRN):  acetaminophen    Suspension .. 650 milliGRAM(s) Oral every 6 hours PRN Temp greater or equal to 38C (100.4F), Mild Pain (1 - 3)    PHYSICAL EXAM:    Vital Signs Last 24 Hrs  T(C): 37.2 (23 Feb 2021 10:00), Max: 37.2 (23 Feb 2021 10:00)  T(F): 98.9 (23 Feb 2021 10:00), Max: 98.9 (23 Feb 2021 10:00)  HR: 76 (23 Feb 2021 09:00) (46 - 76)  BP: 89/59 (23 Feb 2021 10:00) (69/44 - 174/65)  BP(mean): 69 (23 Feb 2021 10:00) (53 - 94)  RR: 21 (23 Feb 2021 10:00) (18 - 22)  SpO2: 94% (23 Feb 2021 10:00) (93% - 100%)    General: Patient sedated/intubated, on pressor + midodrine.    Karnofsky Performance Score/Palliative Performance Status Version2:     20%    HEENT:   ET tube   Lungs: comfortable, on vent. Continues precedex  CV: on pressor + midodrine  GI:   incontinent; [place] sump, obese  :    jackson  Musculoskeletal: patient sedated/intubated, dependent on ADLs  Skin: unable to assess   Neuro: cognitive impairment at baseline; now sedated/intubated, dependent on ADLs    Oral intake ability: unable/only mouth care   Diet: NPO    LABS:                        12.8   5.98  )-----------( 149      ( 23 Feb 2021 04:58 )             38.5     02-23    145  |  111<H>  |  33<H>  ----------------------------<  154<H>  4.4   |  28  |  1.12    Ca    8.7      23 Feb 2021 04:58  Phos  2.8     02-23  Mg     2.3     02-23    TPro  6.4  /  Alb  2.1<L>  /  TBili  0.8  /  DBili  x   /  AST  122<H>  /  ALT  254<H>  /  AlkPhos  310<H>  02-23    RADIOLOGY & ADDITIONAL STUDIES:  ct< from: CT Head No Cont (02.19.21 @ 02:44) >  EXAM:  CT BRAIN                        PROCEDURE DATE:  02/19/2021    INTERPRETATION:  HISTORY: Altered mental status.  COMPARISON: None.  TECHNIQUE: Axial noncontrast CT images from the skull base to the vertex were obtained and submitted for interpretation. Coronal and sagittal reformatted images were performed. Bone and soft tissue windows were evaluated.    FINDINGS:  There is no acute intracranial hemorrhage, midline shift, or abnormal extra-axial fluid collection.  Age-indeterminate left occipital and posterior mesial temporal infarction with loss of gray-white differentiation in the left posterior cerebral artery (PCA) territory is present.  Chronic left cerebellar infarction with ex vacuo dilatation of the fourthventricle.  Patchy and confluent regions of periventricular and deep cerebral white matter hypoattenuation due to chronic microangiopathic ischemic changes. Atheromatous calcifications along the carotid siphons are present.  Mild centrally predominant cerebral volume loss noted.  No evidence of hydrocephalus. Basal cisterns are patent.  Bilateral ethmoid and maxillary sinus mucosal thickening bilateral maxillary sinus polyps and retention cyst noted. Nasopharyngeal secretions likely due to indwelling support devices. Mastoid air cells are clear. Calvarium is intact.  Endotracheal and nasogastric tubes are partially imaged.    IMPRESSION:  Age-indeterminate left PCA territory infarction. Consider MRI for further evaluation.  No acute intracranial bleeding.  Chronic left cerebellar infarction.  < end of copied text >    < from: CT Chest No Cont (02.19.21 @ 02:44) >  EXAM:  CT ABDOMEN AND PELVIS                        EXAM:  CT CHEST                        PROCEDURE DATE:  02/19/2021    INTERPRETATION:  CLINICAL INFORMATION:  Fever.  COMPARISON: None.  PROCEDURE:  CT of the Chest, Abdomen and Pelvis was performed without intravenous contrast.  Intravenous contrast: None.  Oral contrast: None.  Sagittal and coronal reformats were performed.  Postprocessed chest MIP reformatted images were created and reviewed.    FINDINGS:  CHEST:  LINES AND TUBES: Endotracheal tube and endogastric tube appear in place.  LUNGS AND LARGE AIRWAYS: Patent central airways. Right middle lobe as well as patchy bibasilar opacities. Findings nonspecific, likely represent developing pneumonia or aspiration.  PLEURA: No pleural effusion.  VESSELS: Atherosclerotic disease of the aorta and its branches.  HEART: Trace pericardial effusion and/or thickening with mild cardiomegaly. Coronary artery calcifications.  MEDIASTINUM AND JESSY: No lymphadenopathy.  CHESTWALL AND LOWER NECK: Bilateral gynecomastia.    ABDOMEN AND PELVIS:  LIVER: Enlarged measuring up to 20 cm in length.  BILE DUCTS: No significant dilatation.  GALLBLADDER: Cholecystectomy.  SPLEEN: Within normal limits.  PANCREAS: Within normal limits.  ADRENALS: Within normal limits.  KIDNEYS/URETERS: No hydronephrosis. Punctate nonobstructive stone in the upper pole of left kidney. Trace nonspecific bilateral perinephric stranding. 1.6 cm right renal cyst of higher attenuation than simple fluid. Consider nonemergent correlation with renal ultrasound.  BLADDER: Decompressed containing Jackson catheter.  REPRODUCTIVE ORGANS: Prominent prostate measuring up to 5.6 cm in transverse length.  BOWEL: No bowel obstruction. Colonic diverticulosis without acute diverticulitis. Appendix is not visualized without secondary findings of acute appendicitis.  PERITONEUM: No ascites.  VESSELS:  Atherosclerotic disease of the aorta and its branches.  RETROPERITONEUM: No lymphadenopathy.  ABDOMINAL WALL: A small fat containing umbilical and bilateral groin hernia is noted. Right groin femoral catheter identified.  BONES: Multilevel degenerative changes and scoliosis. Indeterminate sclerotic focus in T4 vertebral body.    IMPRESSION:  Right middle lobe as well as patchy bibasilar opacities. Findings nonspecific, likely represent developing pneumonia or aspiration.  No acute bowel inflammatory changes or obstruction.  No hydronephrosis. Punctate nonobstructive stone in the upper pole of left kidney. Trace nonspecific bilateral perinephric stranding.  1.6 cm right renal cyst of higher attenuation than simple fluid. Consider nonemergent correlation with renal ultrasound.  Additional findings as mentioned above.  < end of copied text >    < from: Xray Chest 1 View- PORTABLE-Routine (Xray Chest 1 View- PORTABLE-Routine in AM.) (02.22.21 @ 09:06) >  EXAM:  XR CHEST PORTABLE ROUTINE 1V                        PROCEDURE DATE:  02/22/2021    INTERPRETATION:  CLINICAL STATEMENT: Follow-up chest pain.  TECHNIQUE: AP view of the chest.  COMPARISON: 2/21/2021  FINDINGS/  IMPRESSION:  ET tube, feeding tube, left central line again noted. No pneumothorax.  Better aeration right lung base. No new consolidation  Heart size cannot be accurately assessed in this projection, but appear enlarged.  < end of copied text >    < from: Transthoracic Echocardiogram (02.20.21 @ 07:14) >  Patient name: MANNIE MORENO  YOB: 1945   Age: 75 (M)   MR#: [id number]  Study Date: 2/20/2021  Location: [address] [place], [place] [address]ographer: Carolyn Espino [facility]  Study quality: Technically difficult  Referring Physician:  MELANY RUBI MD  Blood Pressure: 103/68 mmHg  Height: 170 cm  Weight: 121 kg  BSA: 2.3 m2  ------------------------------------------------------------------------    PROCEDURE: Transthoracic echocardiogram with 2-D, M-Mode  and complete spectral and color flow Doppler.  INDICATION: Abnormal electrocardiogram [ECG] [EKG] (R94.31)  HISTORY:  ------------------------------------------------------------------------  DIMENSIONS:  Dimensions:     Normal Values:  LA:     4.7 cm    2.0 - 4.0 cm  Ao:     4.2 cm    2.0 - 3.8 cm  SEPTUM: 1.2 cm    0.6 - 1.2 cm  PWT:    1.0 cm    0.6 - 1.1 cm  LVIDd:  5.5 cm    3.0 - 5.6 cm  LVIDs:  4.1 cm    1.8 - 4.0 cm    Derived Variables:  LVMI: 106 g/m2  RWT: 0.36  Ejection Fraction Visual Estimate: 55 %    ------------------------------------------------------------------------  OBSERVATIONS:  Mitral Valve: Normal mitral valve.  Aortic Root: Aortic Root: 4.2 cm.    Aortic Valve: Normal trileaflet aortic valve.  Left Atrium: Normal left atrium.  Left Ventricle: Endocardium not well visualized; grossly  normal left ventricular systolic function. Normal left  ventricular internal dimensions and wall thicknesses. Grade  I diastolic dysfunction (Impaired relaxation).  Right Heart: Normal right atrium. Normal right ventricular  size and systolic function (TAPSE  2.7cm). Normal tricuspid  valve. Pulmonic valve not well seen.  Pericardium/PleuraNormal pericardium with no pericardial  effusion.  Hemodynamic: Unable to estimate RVSP.  ------------------------------------------------------------------------  CONCLUSIONS:  1. Normal mitral valve.  2. Normal trileaflet aortic valve.  3. Aortic Root: 4.2 cm.  4. Normal left atrium.  5. Normal left ventricular internal dimensions and wall  thicknesses.  6. Endocardium not well visualized; grossly normal left  ventricular systolic function.  7. Grade I diastolic dysfunction (Impaired relaxation).  8. Normal right atrium.  9. Normal right ventricular size and systolic function  (TAPSE  2.7cm).  10. Unable to estimate RVSP.  11. Normal tricuspid valve.  12. Pulmonic valve not well seen.  13. Normal pericardium with no pericardial effusion.  < end of copied text >      ADVANCE DIRECTIVES: Full Code HPI:  75 [facility] with PMhx of CVA x 2 (first in July 2020, second approx 3 weeks ago), HTN, HLD, Prediabetes, BPH was brought in from NH for unresponsiveness. As per NH papers, pt was lethargic and became unresponsive so EMS was called. Per wife, patient was confused and with memory loss after the CVA when he was admitted to [facility] and then discharged to Rehab on 2/8/21. Pt was walking with walker after the first stroke but now non-ambulatory. No other history available. GOC : FULL CODE  (18 Feb 2021 22:53)      PAST MEDICAL & SURGICAL HISTORY:  BPH (benign prostatic hyperplasia)  Hyperlipidemia  Hypertension  Diabetes  Stroke  7/2020 , 1/2021  S/P cholecystectomy    SOCIAL HISTORY:    Admitted from: [facility];   Substance abuse history:  none           Tobacco hx:  none                Alcohol hx:  none           Home Opioid hx:  Christianity:  Latter day                                  Preferred Language: Polish     Surrogate/HCP/Guardian:  Courtney Dudley (wife): [phone]    FAMILY HISTORY: Unable to obtain 2/2 mental status    Baseline ADLs (prior to admission): Per report, patient alert/follows commands, nonambulatory, +confusion and memory loss. Left message for facility to obtain additional information.    No Known Allergies    Present Symptoms:    Review of Systems:  Unable to obtain due to poor mentation - patient sedated/intubated     MEDICATIONS  (STANDING):  aspirin  chewable 81 milliGRAM(s) Oral daily  atorvastatin 80 milliGRAM(s) Oral at bedtime  chlorhexidine 0.12% Liquid 15 milliLiter(s) Oral Mucosa every 12 hours  chlorhexidine 2% Cloths 1 Application(s) Topical <User Schedule>  dexMEDEtomidine Infusion 0.5 MICROgram(s)/kG/Hr (15.1 mL/Hr) IV Continuous <Continuous>  dextrose 50% Injectable 25 Gram(s) IV Push once  enoxaparin Injectable 120 milliGRAM(s) SubCutaneous two times a day  insulin lispro (ADMELOG) corrective regimen sliding scale   SubCutaneous every 6 hours  lactated ringers. 1000 milliLiter(s) (75 mL/Hr) IV Continuous <Continuous>  meropenem  IVPB 1000 milliGRAM(s) IV Intermittent every 8 hours  midodrine 10 milliGRAM(s) Oral every 8 hours  norepinephrine Infusion 0.05 MICROgram(s)/kG/Min (5.75 mL/Hr) IV Continuous <Continuous>  pantoprazole  Injectable 40 milliGRAM(s) IV Push daily    MEDICATIONS  (PRN):  acetaminophen    Suspension .. 650 milliGRAM(s) Oral every 6 hours PRN Temp greater or equal to 38C (100.4F), Mild Pain (1 - 3)    PHYSICAL EXAM:    Vital Signs Last 24 Hrs  T(C): 37.2 (23 Feb 2021 10:00), Max: 37.2 (23 Feb 2021 10:00)  T(F): 98.9 (23 Feb 2021 10:00), Max: 98.9 (23 Feb 2021 10:00)  HR: 76 (23 Feb 2021 09:00) (46 - 76)  BP: 89/59 (23 Feb 2021 10:00) (69/44 - 174/65)  BP(mean): 69 (23 Feb 2021 10:00) (53 - 94)  RR: 21 (23 Feb 2021 10:00) (18 - 22)  SpO2: 94% (23 Feb 2021 10:00) (93% - 100%)    General: Patient sedated/intubated, on pressor + midodrine.    Karnofsky Performance Score/Palliative Performance Status Version2:     20%    HEENT:   ET tube   Lungs: comfortable, on vent. Continues precedex  CV: on pressor + midodrine  GI:   incontinent; [place] sump, obese  :    jackson  Musculoskeletal: patient sedated/intubated, dependent on ADLs  Skin: unable to assess   Neuro: cognitive impairment at baseline; now sedated/intubated, dependent on ADLs    Oral intake ability: unable/only mouth care   Diet: NPO    LABS:                        12.8   5.98  )-----------( 149      ( 23 Feb 2021 04:58 )             38.5     02-23    145  |  111<H>  |  33<H>  ----------------------------<  154<H>  4.4   |  28  |  1.12    Ca    8.7      23 Feb 2021 04:58  Phos  2.8     02-23  Mg     2.3     02-23    TPro  6.4  /  Alb  2.1<L>  /  TBili  0.8  /  DBili  x   /  AST  122<H>  /  ALT  254<H>  /  AlkPhos  310<H>  02-23    RADIOLOGY & ADDITIONAL STUDIES:  ct< from: CT Head No Cont (02.19.21 @ 02:44) >  EXAM:  CT BRAIN                        PROCEDURE DATE:  02/19/2021    INTERPRETATION:  HISTORY: Altered mental status.  COMPARISON: None.  TECHNIQUE: Axial noncontrast CT images from the skull base to the vertex were obtained and submitted for interpretation. Coronal and sagittal reformatted images were performed. Bone and soft tissue windows were evaluated.    FINDINGS:  There is no acute intracranial hemorrhage, midline shift, or abnormal extra-axial fluid collection.  Age-indeterminate left occipital and posterior mesial temporal infarction with loss of gray-white differentiation in the left posterior cerebral artery (PCA) territory is present.  Chronic left cerebellar infarction with ex vacuo dilatation of the fourthventricle.  Patchy and confluent regions of periventricular and deep cerebral white matter hypoattenuation due to chronic microangiopathic ischemic changes. Atheromatous calcifications along the carotid siphons are present.  Mild centrally predominant cerebral volume loss noted.  No evidence of hydrocephalus. Basal cisterns are patent.  Bilateral ethmoid and maxillary sinus mucosal thickening bilateral maxillary sinus polyps and retention cyst noted. Nasopharyngeal secretions likely due to indwelling support devices. Mastoid air cells are clear. Calvarium is intact.  Endotracheal and nasogastric tubes are partially imaged.    IMPRESSION:  Age-indeterminate left PCA territory infarction. Consider MRI for further evaluation.  No acute intracranial bleeding.  Chronic left cerebellar infarction.  < end of copied text >    < from: CT Chest No Cont (02.19.21 @ 02:44) >  EXAM:  CT ABDOMEN AND PELVIS                        EXAM:  CT CHEST                        PROCEDURE DATE:  02/19/2021    INTERPRETATION:  CLINICAL INFORMATION:  Fever.  COMPARISON: None.  PROCEDURE:  CT of the Chest, Abdomen and Pelvis was performed without intravenous contrast.  Intravenous contrast: None.  Oral contrast: None.  Sagittal and coronal reformats were performed.  Postprocessed chest MIP reformatted images were created and reviewed.    FINDINGS:  CHEST:  LINES AND TUBES: Endotracheal tube and endogastric tube appear in place.  LUNGS AND LARGE AIRWAYS: Patent central airways. Right middle lobe as well as patchy bibasilar opacities. Findings nonspecific, likely represent developing pneumonia or aspiration.  PLEURA: No pleural effusion.  VESSELS: Atherosclerotic disease of the aorta and its branches.  HEART: Trace pericardial effusion and/or thickening with mild cardiomegaly. Coronary artery calcifications.  MEDIASTINUM AND JESSY: No lymphadenopathy.  CHESTWALL AND LOWER NECK: Bilateral gynecomastia.    ABDOMEN AND PELVIS:  LIVER: Enlarged measuring up to 20 cm in length.  BILE DUCTS: No significant dilatation.  GALLBLADDER: Cholecystectomy.  SPLEEN: Within normal limits.  PANCREAS: Within normal limits.  ADRENALS: Within normal limits.  KIDNEYS/URETERS: No hydronephrosis. Punctate nonobstructive stone in the upper pole of left kidney. Trace nonspecific bilateral perinephric stranding. 1.6 cm right renal cyst of higher attenuation than simple fluid. Consider nonemergent correlation with renal ultrasound.  BLADDER: Decompressed containing Jackson catheter.  REPRODUCTIVE ORGANS: Prominent prostate measuring up to 5.6 cm in transverse length.  BOWEL: No bowel obstruction. Colonic diverticulosis without acute diverticulitis. Appendix is not visualized without secondary findings of acute appendicitis.  PERITONEUM: No ascites.  VESSELS:  Atherosclerotic disease of the aorta and its branches.  RETROPERITONEUM: No lymphadenopathy.  ABDOMINAL WALL: A small fat containing umbilical and bilateral groin hernia is noted. Right groin femoral catheter identified.  BONES: Multilevel degenerative changes and scoliosis. Indeterminate sclerotic focus in T4 vertebral body.    IMPRESSION:  Right middle lobe as well as patchy bibasilar opacities. Findings nonspecific, likely represent developing pneumonia or aspiration.  No acute bowel inflammatory changes or obstruction.  No hydronephrosis. Punctate nonobstructive stone in the upper pole of left kidney. Trace nonspecific bilateral perinephric stranding.  1.6 cm right renal cyst of higher attenuation than simple fluid. Consider nonemergent correlation with renal ultrasound.  Additional findings as mentioned above.  < end of copied text >    < from: Xray Chest 1 View- PORTABLE-Routine (Xray Chest 1 View- PORTABLE-Routine in AM.) (02.22.21 @ 09:06) >  EXAM:  XR CHEST PORTABLE ROUTINE 1V                        PROCEDURE DATE:  02/22/2021    INTERPRETATION:  CLINICAL STATEMENT: Follow-up chest pain.  TECHNIQUE: AP view of the chest.  COMPARISON: 2/21/2021  FINDINGS/  IMPRESSION:  ET tube, feeding tube, left central line again noted. No pneumothorax.  Better aeration right lung base. No new consolidation  Heart size cannot be accurately assessed in this projection, but appear enlarged.  < end of copied text >    < from: Transthoracic Echocardiogram (02.20.21 @ 07:14) >  Patient name: MANNIE MORENO  YOB: 1945   Age: 75 (M)   MR#: [id number]  Study Date: 2/20/2021  Location: [address] [place], [place] [address]ographer: Carolyn Espino [facility]  Study quality: Technically difficult  Referring Physician:  MELANY RUBI MD  Blood Pressure: 103/68 mmHg  Height: 170 cm  Weight: 121 kg  BSA: 2.3 m2  ------------------------------------------------------------------------    PROCEDURE: Transthoracic echocardiogram with 2-D, M-Mode  and complete spectral and color flow Doppler.  INDICATION: Abnormal electrocardiogram [ECG] [EKG] (R94.31)  HISTORY:  ------------------------------------------------------------------------  DIMENSIONS:  Dimensions:     Normal Values:  LA:     4.7 cm    2.0 - 4.0 cm  Ao:     4.2 cm    2.0 - 3.8 cm  SEPTUM: 1.2 cm    0.6 - 1.2 cm  PWT:    1.0 cm    0.6 - 1.1 cm  LVIDd:  5.5 cm    3.0 - 5.6 cm  LVIDs:  4.1 cm    1.8 - 4.0 cm    Derived Variables:  LVMI: 106 g/m2  RWT: 0.36  Ejection Fraction Visual Estimate: 55 %    ------------------------------------------------------------------------  OBSERVATIONS:  Mitral Valve: Normal mitral valve.  Aortic Root: Aortic Root: 4.2 cm.    Aortic Valve: Normal trileaflet aortic valve.  Left Atrium: Normal left atrium.  Left Ventricle: Endocardium not well visualized; grossly  normal left ventricular systolic function. Normal left  ventricular internal dimensions and wall thicknesses. Grade  I diastolic dysfunction (Impaired relaxation).  Right Heart: Normal right atrium. Normal right ventricular  size and systolic function (TAPSE  2.7cm). Normal tricuspid  valve. Pulmonic valve not well seen.  Pericardium/PleuraNormal pericardium with no pericardial  effusion.  Hemodynamic: Unable to estimate RVSP.  ------------------------------------------------------------------------  CONCLUSIONS:  1. Normal mitral valve.  2. Normal trileaflet aortic valve.  3. Aortic Root: 4.2 cm.  4. Normal left atrium.  5. Normal left ventricular internal dimensions and wall  thicknesses.  6. Endocardium not well visualized; grossly normal left  ventricular systolic function.  7. Grade I diastolic dysfunction (Impaired relaxation).  8. Normal right atrium.  9. Normal right ventricular size and systolic function  (TAPSE  2.7cm).  10. Unable to estimate RVSP.  11. Normal tricuspid valve.  12. Pulmonic valve not well seen.  13. Normal pericardium with no pericardial effusion.  < end of copied text >      ADVANCE DIRECTIVES: Full Code

## 2021-02-23 NOTE — PROGRESS NOTE ADULT - ASSESSMENT
75 Y M from [facility] rehab, non ambulatory with PMhx of CVA( twice first in July 2020, second 3 weeks ago), HTN, HLD, Prediabetes,BPH was brought in from NH for unresponsiveness.   ED course: Pt was unresponsive so was intubated in The ED and Femoral line was placed by the ED physician   Vitals : Febrile to 103 F , , /108  Labs : wbc 6k ,lactate 1.2, Bun/CR 40/1.56  T1 0.018  ECG : NSR   CXR : clear    Pt will be admitted to ICU for Acute hypoxic Respiratory Failure and Septic shock         Assessment:  1. Acute Hypoxic Respiratory Failure     2. Acute Encephalopathy   3. Septic shock   4. CVA   5. pre-diabetes   6. HTN   7. BPH     Plan:    =====================[CNS ]==============================  # Acute Encephalopathy :    - recent stroke 3 weeks ago , alert ,oriented and follows commands as per NH papers at baseline, confused and memory loss after stroke as per wife   -  CTH no acute event ,Age-indeterminate left PCA territory infarction.  -will dc dilaudid and propofol      # CVA :   - L post cerebral A stroke as per NH papers   - Pt is on Aspirin, FD lovenox and statins        =====================[CVS ]==============================  # Septic shock :   - likely 2/2 Urosepsis   - UA +ve   - c/w vasopressor support   -  on Meropenem 1g q12 for now , vanc  - f/u BCx pos to enterococci and CONS  ,UCx pos to klebsiella  -f/u repeat cultures      # HTN :   - pt is on toprol, losartan and nifedipine   - Hold BP medications   - c/w vasopressors for BP support - on levo  -  Echo grade 1 diastolic dysfuction,    =====================[RESP ]==============================  # Acute Hypoxic Resp Failure :   - Pt was intubated as pt was unresponsive   - CXR Clear   - CT chest showed Right middle lobe as well as patchy bibasilar opacities.    - c/w Mech vent     =====================[ GI ]================================  # No issues :   - NGT in place   -TF     ====================[ RENAL ]=============================   # URSULA over CKD :   - baseline Cr around 1.4   - FC in place   - monitor urine output   - I&Os  - Monitor electrolytes   -given albumin    =====================[ ID ]================================  # Urosepsis :   -  febrile to 103 F, no wbc count or lactate , CXR clear   - UA +ve   urine culture klebsiella  - s/p 1 dose of vanco and zosyn in the ED   - will c/w meropenem and vanc   f/u ID     ===================[ HEME/ONC ]===========================  # No issues :  - Hb and Plt stable   - monitor cbc daily     =====================[ ENDO ]=============================  # Pre -diabetes : -  - Last A1c 6.2   - target CBG < 180  - FS q6 while NPO, will start on ISS       ==================[ SKIN/ CATHETERS ]======================  # no wound or skin break   # R fem line -  - LIJ placed,  ==================[ PROPHYLAXIS ]==========================   # Dvt : full dose lovenox  # Gi : Protonix     ====================[ DISPO ]==============================   - Monitor in ICU     ===================[ PROGNOSIS ]===========================  - Guarded      75 Y M from [facility] rehab, non ambulatory with PMhx of CVA( twice first in July 2020, second 3 weeks ago), HTN, HLD, Prediabetes,BPH was brought in from NH for unresponsiveness.   ED course: Pt was unresponsive so was intubated in The ED and Femoral line was placed by the ED physician   Vitals : Febrile to 103 F , , /108  Labs : wbc 6k ,lactate 1.2, Bun/CR 40/1.56  T1 0.018  ECG : NSR   CXR : clear    Pt will be admitted to ICU for Acute hypoxic Respiratory Failure and Septic shock         Assessment:  1. Acute Hypoxic Respiratory Failure     2. Acute Encephalopathy   3. Septic shock   4. CVA   5. pre-diabetes   6. HTN   7. BPH     Plan:    =====================[CNS ]==============================  # Acute Encephalopathy :    - recent stroke 3 weeks ago , alert ,oriented and follows commands as per NH papers at baseline, confused and memory loss after stroke as per wife   -  CTH no acute event ,Age-indeterminate left PCA territory infarction.  -on precedex    # CVA :   - L post cerebral A stroke as per NH papers   - Pt is on Aspirin, FD lovenox and statins        =====================[CVS ]==============================  # Septic shock :   - likely 2/2 Urosepsis   - UA +ve   - c/w vasopressor support   -  on Meropenem 1g q12 for now , vanc  - f/u BCx pos to enterococci and CONS  ,UCx pos to klebsiella  -f/u repeat cultures      # HTN :   - pt is on toprol, losartan and nifedipine   - Hold BP medications   - c/w vasopressors for BP support - on levo  -  Echo grade 1 diastolic dysfuction,    =====================[RESP ]==============================  # Acute Hypoxic Resp Failure :   - Pt was intubated as pt was unresponsive   - CXR Clear   - CT chest showed Right middle lobe as well as patchy bibasilar opacities.    - c/w Mech vent     =====================[ GI ]================================  # No issues :   - NGT in place   -TF     ====================[ RENAL ]=============================   # URSULA over CKD :   - baseline Cr around 1.4   - FC in place   - monitor urine output   - I&Os  - Monitor electrolytes   -given albumin    =====================[ ID ]================================  # Urosepsis :   -  febrile to 103 F, no wbc count or lactate , CXR clear   - UA +ve   urine culture klebsiella  - s/p 1 dose of vanco and zosyn in the ED   - will c/w meropenem and vanc   f/u ID     ===================[ HEME/ONC ]===========================  # No issues :  - Hb and Plt stable   - monitor cbc daily     =====================[ ENDO ]=============================  # Pre -diabetes : -  - Last A1c 6.2   - target CBG < 180  - FS q6 while NPO, will start on ISS       ==================[ SKIN/ CATHETERS ]======================  # no wound or skin break   # R fem line -  - LIJ placed,  ==================[ PROPHYLAXIS ]==========================   # Dvt : full dose lovenox  # Gi : Protonix     ====================[ DISPO ]==============================   - Monitor in ICU     ===================[ PROGNOSIS ]===========================  - Guarded      75 Y M from [facility] rehab, non ambulatory with PMhx of CVA( twice first in July 2020, second 3 weeks ago), HTN, HLD, Prediabetes,BPH was brought in from NH for unresponsiveness.   ED course: Pt was unresponsive so was intubated in The ED and Femoral line was placed by the ED physician   Vitals : Febrile to 103 F , , /108  Labs : wbc 6k ,lactate 1.2, Bun/CR 40/1.56  T1 0.018  ECG : NSR   CXR : clear    Pt will be admitted to ICU for Acute hypoxic Respiratory Failure and Septic shock         Assessment:  1. Acute Hypoxic Respiratory Failure     2. Acute Encephalopathy   3. Septic shock   4. CVA   5. pre-diabetes   6. HTN   7. BPH     Plan:    =====================[CNS ]==============================  # Acute Encephalopathy :    - recent stroke 3 weeks ago , alert ,oriented and follows commands as per NH papers at baseline, confused and memory loss after stroke as per wife   -  CTH no acute event ,Age-indeterminate left PCA territory infarction.  -on precedex    # CVA :   - L post cerebral A stroke as per NH papers   - Pt is on Aspirin, FD lovenox and statins        =====================[CVS ]==============================  # Septic shock :   - likely 2/2 Urosepsis   - UA +ve   - c/w vasopressor support   -  on Meropenem 1g q12 for now , vanc  - f/u BCx pos to enterococci and CONS  ,UCx pos to klebsiella  -f/u repeat cultures neg till date       # HTN :   - pt is on toprol, losartan and nifedipine   - Hold BP medications   - c/w vasopressors for BP support - on levo  -  Echo grade 1 diastolic dysfuction,    =====================[RESP ]==============================  # Acute Hypoxic Resp Failure :   - Pt was intubated as pt was unresponsive   - CXR Clear   - CT chest showed Right middle lobe as well as patchy bibasilar opacities.    - c/w Mech vent     =====================[ GI ]================================  # No issues :   - NGT in place   -TF     ====================[ RENAL ]=============================   # URSULA over CKD :   - baseline Cr around 1.4   - FC in place   - monitor urine output   - I&Os  - Monitor electrolytes   -given albumin    =====================[ ID ]================================  # Urosepsis :   -  febrile to 103 F, no wbc count or lactate , CXR clear   - UA +ve   urine culture klebsiella  - s/p 1 dose of vanco and zosyn in the ED   - will c/w meropenem       ===================[ HEME/ONC ]===========================  # No issues :  - Hb and Plt stable   - monitor cbc daily     =====================[ ENDO ]=============================  # Pre -diabetes : -  - Last A1c 6.2   - target CBG < 180  - FS q6 while NPO, will start on ISS       ==================[ SKIN/ CATHETERS ]======================  # no wound or skin break   # R fem line -  - LIJ placed,  ==================[ PROPHYLAXIS ]==========================   # Dvt : full dose lovenox  # Gi : Protonix     ====================[ DISPO ]==============================   - Monitor in ICU     ===================[ PROGNOSIS ]===========================  - Guarded      75 Y M from [facility] rehab, non ambulatory with PMhx of CVA( twice first in July 2020, second 3 weeks ago), HTN, HLD, Prediabetes,BPH was brought in from NH for unresponsiveness.   ED course: Pt was unresponsive so was intubated in The ED and Femoral line was placed by the ED physician   Vitals : Febrile to 103 F , , /108  Labs : wbc 6k ,lactate 1.2, Bun/CR 40/1.56  T1 0.018  ECG : NSR   CXR : clear    Pt will be admitted to ICU for Acute hypoxic Respiratory Failure and Septic shock         Assessment:  1. Acute Hypoxic Respiratory Failure     2. Acute Encephalopathy   3. Septic shock   4. CVA   5. pre-diabetes   6. HTN   7. BPH     Plan:    =====================[CNS ]==============================  # Acute Encephalopathy :    - recent stroke 3 weeks ago , alert ,oriented and follows commands as per NH papers at baseline, confused and memory loss after stroke as per wife   -  CTH no acute event ,Age-indeterminate left PCA territory infarction.  -on precedex , will try to titrate     # CVA :   - L post cerebral A stroke as per NH papers   - Pt is on Aspirin, FD lovenox and statins        =====================[CVS ]==============================  # Septic shock :   - likely 2/2 Urosepsis   - UA +ve   - c/w vasopressor support   -  on Meropenem 1g q12 for now , vanc  - BCx pos to enterococci and CONS  ,UCx pos to klebsiella  - repeat cultures neg till date       # HTN :   - pt is on toprol, losartan and nifedipine   - Hold BP medications   - c/w vasopressors for BP support - on levo  -  Echo grade 1 diastolic dysfuction,    =====================[RESP ]==============================  # Acute Hypoxic Resp Failure :   - Pt was intubated as pt was unresponsive   - CXR Clear   - CT chest showed Right middle lobe as well as patchy bibasilar opacities.    - c/w Mech vent     =====================[ GI ]================================  # No issues :   - NGT in place   -TF     ====================[ RENAL ]=============================   # URSULA over CKD :   - baseline Cr around 1.4   - FC in place   - monitor urine output   - I&Os  - Monitor electrolytes   -given albumin    =====================[ ID ]================================  # Urosepsis :   -  febrile to 103 F, no wbc count or lactate , CXR clear   - UA +ve   urine culture klebsiella  - s/p 1 dose of vanco and zosyn in the ED   - will c/w meropenem       ===================[ HEME/ONC ]===========================  # No issues :  - Hb and Plt stable   - monitor cbc daily     =====================[ ENDO ]=============================  # Pre -diabetes : -  - Last A1c 6.2   - target CBG < 180  - FS q6 while NPO, will start on ISS       ==================[ SKIN/ CATHETERS ]======================  # no wound or skin break   # R fem line -  - LIJ placed,  ==================[ PROPHYLAXIS ]==========================   # Dvt : full dose lovenox  # Gi : Protonix     ====================[ DISPO ]==============================   - Monitor in ICU     ===================[ PROGNOSIS ]===========================  - Guarded

## 2021-02-24 LAB
ALBUMIN SERPL ELPH-MCNC: 2 G/DL — LOW (ref 3.5–5)
ALP SERPL-CCNC: 243 U/L — HIGH (ref 40–120)
ALT FLD-CCNC: 198 U/L DA — HIGH (ref 10–60)
ANION GAP SERPL CALC-SCNC: 5 MMOL/L — SIGNIFICANT CHANGE UP (ref 5–17)
AST SERPL-CCNC: 105 U/L — HIGH (ref 10–40)
BASE EXCESS BLDA CALC-SCNC: 4.3 MMOL/L — HIGH (ref -2–2)
BILIRUB SERPL-MCNC: 0.5 MG/DL — SIGNIFICANT CHANGE UP (ref 0.2–1.2)
BLOOD GAS COMMENTS ARTERIAL: SIGNIFICANT CHANGE UP
BUN SERPL-MCNC: 38 MG/DL — HIGH (ref 7–18)
CALCIUM SERPL-MCNC: 8.8 MG/DL — SIGNIFICANT CHANGE UP (ref 8.4–10.5)
CHLORIDE SERPL-SCNC: 111 MMOL/L — HIGH (ref 96–108)
CO2 SERPL-SCNC: 29 MMOL/L — SIGNIFICANT CHANGE UP (ref 22–31)
CREAT SERPL-MCNC: 1.23 MG/DL — SIGNIFICANT CHANGE UP (ref 0.5–1.3)
GLUCOSE BLDC GLUCOMTR-MCNC: 114 MG/DL — HIGH (ref 70–99)
GLUCOSE BLDC GLUCOMTR-MCNC: 117 MG/DL — HIGH (ref 70–99)
GLUCOSE BLDC GLUCOMTR-MCNC: 124 MG/DL — HIGH (ref 70–99)
GLUCOSE BLDC GLUCOMTR-MCNC: 133 MG/DL — HIGH (ref 70–99)
GLUCOSE BLDC GLUCOMTR-MCNC: 133 MG/DL — HIGH (ref 70–99)
GLUCOSE SERPL-MCNC: 133 MG/DL — HIGH (ref 70–99)
HCO3 BLDA-SCNC: 28 MMOL/L — HIGH (ref 23–27)
HCT VFR BLD CALC: 38.2 % — LOW (ref 39–50)
HGB BLD-MCNC: 12.4 G/DL — LOW (ref 13–17)
HOROWITZ INDEX BLDA+IHG-RTO: 40 — SIGNIFICANT CHANGE UP
INR BLD: 1.23 RATIO — HIGH (ref 0.88–1.16)
MAGNESIUM SERPL-MCNC: 2.2 MG/DL — SIGNIFICANT CHANGE UP (ref 1.6–2.6)
MCHC RBC-ENTMCNC: 31.2 PG — SIGNIFICANT CHANGE UP (ref 27–34)
MCHC RBC-ENTMCNC: 32.5 GM/DL — SIGNIFICANT CHANGE UP (ref 32–36)
MCV RBC AUTO: 96 FL — SIGNIFICANT CHANGE UP (ref 80–100)
NRBC # BLD: 0 /100 WBCS — SIGNIFICANT CHANGE UP (ref 0–0)
PCO2 BLDA: 39 MMHG — SIGNIFICANT CHANGE UP (ref 32–46)
PH BLDA: 7.47 — HIGH (ref 7.35–7.45)
PHOSPHATE SERPL-MCNC: 2.8 MG/DL — SIGNIFICANT CHANGE UP (ref 2.5–4.5)
PLATELET # BLD AUTO: 153 K/UL — SIGNIFICANT CHANGE UP (ref 150–400)
PO2 BLDA: 79 MMHG — SIGNIFICANT CHANGE UP (ref 74–108)
POTASSIUM SERPL-MCNC: 4.3 MMOL/L — SIGNIFICANT CHANGE UP (ref 3.5–5.3)
POTASSIUM SERPL-SCNC: 4.3 MMOL/L — SIGNIFICANT CHANGE UP (ref 3.5–5.3)
PROT SERPL-MCNC: 6.2 G/DL — SIGNIFICANT CHANGE UP (ref 6–8.3)
PROTHROM AB SERPL-ACNC: 14.5 SEC — HIGH (ref 10.6–13.6)
RBC # BLD: 3.98 M/UL — LOW (ref 4.2–5.8)
RBC # FLD: 12.1 % — SIGNIFICANT CHANGE UP (ref 10.3–14.5)
SAO2 % BLDA: 96 % — SIGNIFICANT CHANGE UP (ref 92–96)
SODIUM SERPL-SCNC: 145 MMOL/L — SIGNIFICANT CHANGE UP (ref 135–145)
TRIGL SERPL-MCNC: 130 MG/DL — SIGNIFICANT CHANGE UP
WBC # BLD: 6.74 K/UL — SIGNIFICANT CHANGE UP (ref 3.8–10.5)
WBC # FLD AUTO: 6.74 K/UL — SIGNIFICANT CHANGE UP (ref 3.8–10.5)

## 2021-02-24 PROCEDURE — 71045 X-RAY EXAM CHEST 1 VIEW: CPT | Mod: 26

## 2021-02-24 RX ADMIN — MIDODRINE HYDROCHLORIDE 10 MILLIGRAM(S): 2.5 TABLET ORAL at 13:01

## 2021-02-24 RX ADMIN — Medication 81 MILLIGRAM(S): at 11:07

## 2021-02-24 RX ADMIN — CHLORHEXIDINE GLUCONATE 15 MILLILITER(S): 213 SOLUTION TOPICAL at 17:03

## 2021-02-24 RX ADMIN — MEROPENEM 100 MILLIGRAM(S): 1 INJECTION INTRAVENOUS at 04:48

## 2021-02-24 RX ADMIN — CHLORHEXIDINE GLUCONATE 1 APPLICATION(S): 213 SOLUTION TOPICAL at 04:48

## 2021-02-24 RX ADMIN — ENOXAPARIN SODIUM 120 MILLIGRAM(S): 100 INJECTION SUBCUTANEOUS at 17:03

## 2021-02-24 RX ADMIN — MIDODRINE HYDROCHLORIDE 10 MILLIGRAM(S): 2.5 TABLET ORAL at 04:48

## 2021-02-24 RX ADMIN — MEROPENEM 100 MILLIGRAM(S): 1 INJECTION INTRAVENOUS at 13:01

## 2021-02-24 RX ADMIN — MIDODRINE HYDROCHLORIDE 10 MILLIGRAM(S): 2.5 TABLET ORAL at 20:27

## 2021-02-24 RX ADMIN — ATORVASTATIN CALCIUM 80 MILLIGRAM(S): 80 TABLET, FILM COATED ORAL at 20:27

## 2021-02-24 RX ADMIN — ENOXAPARIN SODIUM 120 MILLIGRAM(S): 100 INJECTION SUBCUTANEOUS at 04:48

## 2021-02-24 RX ADMIN — MEROPENEM 100 MILLIGRAM(S): 1 INJECTION INTRAVENOUS at 20:27

## 2021-02-24 RX ADMIN — PANTOPRAZOLE SODIUM 40 MILLIGRAM(S): 20 TABLET, DELAYED RELEASE ORAL at 11:07

## 2021-02-24 RX ADMIN — CHLORHEXIDINE GLUCONATE 15 MILLILITER(S): 213 SOLUTION TOPICAL at 04:48

## 2021-02-24 NOTE — PROGRESS NOTE ADULT - ASSESSMENT
Septic Shock - resolved  Pneumonia with respiratory failure - likely aspiration  Fevers - low grade  Bacteremia - likely contaminants , meropenem will cover them as well.  ?UTI  ·	Cont Meropenem 1 gm iv q 8hrs  Time spent - 30 mins

## 2021-02-24 NOTE — PROGRESS NOTE ADULT - SUBJECTIVE AND OBJECTIVE BOX
Patient is a 75y old  Male who presents with a chief complaint of Unresponsive (23 Feb 2021 18:04)      INTERVAL HPI/OVERNIGHT EVENTS:  ICU Vital Signs Last 24 Hrs  T(C): 37.8 (24 Feb 2021 04:00), Max: 37.8 (23 Feb 2021 20:00)  T(F): 100 (24 Feb 2021 04:00), Max: 100 (23 Feb 2021 20:00)  HR: 69 (24 Feb 2021 09:40) (60 - 88)  BP: 103/58 (24 Feb 2021 09:00) (80/56 - 139/70)  BP(mean): 72 (24 Feb 2021 09:00) (65 - 91)  ABP: --  ABP(mean): --  RR: 18 (24 Feb 2021 09:00) (18 - 22)  SpO2: 94% (24 Feb 2021 09:40) (91% - 97%)    I&O's Summary    23 Feb 2021 07:01  -  24 Feb 2021 07:00  --------------------------------------------------------  IN: 1111.4 mL / OUT: 1005 mL / NET: 106.4 mL    24 Feb 2021 07:01  -  24 Feb 2021 10:00  --------------------------------------------------------  IN: 64.2 mL / OUT: 95 mL / NET: -30.8 mL      Mode: AC/ CMV (Assist Control/ Continuous Mandatory Ventilation)  RR (machine): 18  TV (machine): 450  FiO2: 40  PEEP: 5  ITime: 0.8  MAP: 10  PIP: 26      LABS:                        12.4   6.74  )-----------( 153      ( 24 Feb 2021 04:43 )             38.2     02-24    145  |  111<H>  |  38<H>  ----------------------------<  133<H>  4.3   |  29  |  1.23    Ca    8.8      24 Feb 2021 04:43  Phos  2.8     02-24  Mg     2.2     02-24    TPro  6.2  /  Alb  2.0<L>  /  TBili  0.5  /  DBili  x   /  AST  105<H>  /  ALT  198<H>  /  AlkPhos  243<H>  02-24    PT/INR - ( 24 Feb 2021 04:43 )   PT: 14.5 sec;   INR: 1.23 ratio             CAPILLARY BLOOD GLUCOSE      POCT Blood Glucose.: 133 mg/dL (24 Feb 2021 04:36)  POCT Blood Glucose.: 117 mg/dL (24 Feb 2021 00:06)  POCT Blood Glucose.: 119 mg/dL (23 Feb 2021 16:09)  POCT Blood Glucose.: 138 mg/dL (23 Feb 2021 10:51)    ABG - ( 24 Feb 2021 03:39 )  pH, Arterial: 7.47  pH, Blood: x     /  pCO2: 39    /  pO2: 79    / HCO3: 28    / Base Excess: 4.3   /  SaO2: 96                  RADIOLOGY & ADDITIONAL TESTS:    Consultant(s) Notes Reviewed:  [x ] YES  [ ] NO    MEDICATIONS  (STANDING):  aspirin  chewable 81 milliGRAM(s) Oral daily  atorvastatin 80 milliGRAM(s) Oral at bedtime  chlorhexidine 0.12% Liquid 15 milliLiter(s) Oral Mucosa every 12 hours  chlorhexidine 2% Cloths 1 Application(s) Topical <User Schedule>  dexMEDEtomidine Infusion 0.5 MICROgram(s)/kG/Hr (15.1 mL/Hr) IV Continuous <Continuous>  dextrose 50% Injectable 25 Gram(s) IV Push once  enoxaparin Injectable 120 milliGRAM(s) SubCutaneous two times a day  insulin lispro (ADMELOG) corrective regimen sliding scale   SubCutaneous every 6 hours  lactated ringers. 1000 milliLiter(s) (75 mL/Hr) IV Continuous <Continuous>  meropenem  IVPB 1000 milliGRAM(s) IV Intermittent every 8 hours  midodrine 10 milliGRAM(s) Oral every 8 hours  norepinephrine Infusion 0.05 MICROgram(s)/kG/Min (5.75 mL/Hr) IV Continuous <Continuous>  pantoprazole  Injectable 40 milliGRAM(s) IV Push daily    MEDICATIONS  (PRN):  acetaminophen    Suspension .. 650 milliGRAM(s) Oral every 6 hours PRN Temp greater or equal to 38C (100.4F), Mild Pain (1 - 3)      PHYSICAL EXAM:  GENERAL: well built, well nourished  HEAD:  Atraumatic, Normocephalic  EYES: EOMI, PERRLA, conjunctiva and sclera clear  ENT: No tonsillar erythema, exudates, or enlargement; Moist mucous membranes, Good dentition, No lesions  NECK: Supple, No JVD, Normal thyroid, no enlarged nodes  NERVOUS SYSTEM:  Alert & Oriented X3, Good concentration; Motor Strength 5/5 B/L upper and lower extremities; DTRs 2+ intact and symmetric, sensory intact  CHEST/LUNG: B/L good air entry; No rales, rhonchi, or wheezing  HEART: S1S2 normal, no S3, Regular rate and rhythm; No murmurs, rubs, or gallops  ABDOMEN: Soft, Nontender, Nondistended; Bowel sounds present  EXTREMITIES:  2+ Peripheral Pulses, No clubbing, cyanosis, or edema  LYMPH: No lymphadenopathy noted  SKIN: No rashes or lesions    Care Discussed with Consultants/Other Providers [ x] YES  [ ] NO Patient is a 75y old  Male who presents with a chief complaint of Unresponsive (23 Feb 2021 18:04)      INTERVAL HPI/OVERNIGHT EVENTS:patient examined be dsesperanza, SBT at 1 pm after titrating precedex  ICU Vital Signs Last 24 Hrs  T(C): 37.8 (24 Feb 2021 04:00), Max: 37.8 (23 Feb 2021 20:00)  T(F): 100 (24 Feb 2021 04:00), Max: 100 (23 Feb 2021 20:00)  HR: 69 (24 Feb 2021 09:40) (60 - 88)  BP: 103/58 (24 Feb 2021 09:00) (80/56 - 139/70)  BP(mean): 72 (24 Feb 2021 09:00) (65 - 91)  ABP: --  ABP(mean): --  RR: 18 (24 Feb 2021 09:00) (18 - 22)  SpO2: 94% (24 Feb 2021 09:40) (91% - 97%)    I&O's Summary    23 Feb 2021 07:01  -  24 Feb 2021 07:00  --------------------------------------------------------  IN: 1111.4 mL / OUT: 1005 mL / NET: 106.4 mL    24 Feb 2021 07:01  -  24 Feb 2021 10:00  --------------------------------------------------------  IN: 64.2 mL / OUT: 95 mL / NET: -30.8 mL      Mode: AC/ CMV (Assist Control/ Continuous Mandatory Ventilation)  RR (machine): 18  TV (machine): 450  FiO2: 40  PEEP: 5  ITime: 0.8  MAP: 10  PIP: 26      LABS:                        12.4   6.74  )-----------( 153      ( 24 Feb 2021 04:43 )             38.2     02-24    145  |  111<H>  |  38<H>  ----------------------------<  133<H>  4.3   |  29  |  1.23    Ca    8.8      24 Feb 2021 04:43  Phos  2.8     02-24  Mg     2.2     02-24    TPro  6.2  /  Alb  2.0<L>  /  TBili  0.5  /  DBili  x   /  AST  105<H>  /  ALT  198<H>  /  AlkPhos  243<H>  02-24    PT/INR - ( 24 Feb 2021 04:43 )   PT: 14.5 sec;   INR: 1.23 ratio             CAPILLARY BLOOD GLUCOSE      POCT Blood Glucose.: 133 mg/dL (24 Feb 2021 04:36)  POCT Blood Glucose.: 117 mg/dL (24 Feb 2021 00:06)  POCT Blood Glucose.: 119 mg/dL (23 Feb 2021 16:09)  POCT Blood Glucose.: 138 mg/dL (23 Feb 2021 10:51)    ABG - ( 24 Feb 2021 03:39 )  pH, Arterial: 7.47  pH, Blood: x     /  pCO2: 39    /  pO2: 79    / HCO3: 28    / Base Excess: 4.3   /  SaO2: 96                  RADIOLOGY & ADDITIONAL TESTS:    Consultant(s) Notes Reviewed:  [x ] YES  [ ] NO    MEDICATIONS  (STANDING):  aspirin  chewable 81 milliGRAM(s) Oral daily  atorvastatin 80 milliGRAM(s) Oral at bedtime  chlorhexidine 0.12% Liquid 15 milliLiter(s) Oral Mucosa every 12 hours  chlorhexidine 2% Cloths 1 Application(s) Topical <User Schedule>  dexMEDEtomidine Infusion 0.5 MICROgram(s)/kG/Hr (15.1 mL/Hr) IV Continuous <Continuous>  dextrose 50% Injectable 25 Gram(s) IV Push once  enoxaparin Injectable 120 milliGRAM(s) SubCutaneous two times a day  insulin lispro (ADMELOG) corrective regimen sliding scale   SubCutaneous every 6 hours  lactated ringers. 1000 milliLiter(s) (75 mL/Hr) IV Continuous <Continuous>  meropenem  IVPB 1000 milliGRAM(s) IV Intermittent every 8 hours  midodrine 10 milliGRAM(s) Oral every 8 hours  norepinephrine Infusion 0.05 MICROgram(s)/kG/Min (5.75 mL/Hr) IV Continuous <Continuous>  pantoprazole  Injectable 40 milliGRAM(s) IV Push daily    MEDICATIONS  (PRN):  acetaminophen    Suspension .. 650 milliGRAM(s) Oral every 6 hours PRN Temp greater or equal to 38C (100.4F), Mild Pain (1 - 3)      PHYSICAL EXAM:  GENERAL:obese , +ETT   HEAD:  Atraumatic, Normocephalic  EYES: EOMI, PERRLA, conjunctiva and sclera clear  ENT: No tonsillar erythema, exudates, or enlargement; Moist mucous membranes, Good dentition, No lesions  NECK: Supple, No JVD, Normal thyroid, no enlarged nodes  NERVOUS SYSTEM:  Alert after stopping sedation   CHEST/LUNG: B/L good air entry; No rales, rhonchi, or wheezing  HEART: S1S2 normal, no S3, Regular rate and rhythm; No murmurs, rubs, or gallops  ABDOMEN: Soft, Nontender, Nondistended; Bowel sounds present  EXTREMITIES:  2+ Peripheral Pulses, bilateral LE oedema and scrotal oedema  LYMPH: No lymphadenopathy noted  SKIN: No rashes or lesions    Care Discussed with Consultants/Other Providers [ x] YES  [ ] NO

## 2021-02-24 NOTE — PROGRESS NOTE ADULT - ASSESSMENT
75 yr old male fom NH, H/O CVA recent/dementia/HTN/BPH, admit hospital for unresponsive, intubated at ER, CT chest/ABD showed PNA/aspiration(?)/blood culture positive, start pressor for septic shock//IV ABS, F/U pan culture result, ICU care, close monitor lab, titrate pressor, elevated LFT, shock liver(?), close monitor LFT , continue ICU care, vent support, DX unresponsive/acute hypoxic respiratory failure/intubatiob/septic shock/aspiration PNA/shock liver/pyelonephritis/bacteremia. titrate pressor. continue ICU care. Palliative follow up, discuss with family goal of care. Prognosis poor.

## 2021-02-24 NOTE — PROGRESS NOTE ADULT - ATTENDING COMMENTS
IMP: This is a 75 yr old man from [facility], non ambulatory with  CVA( twice first in July 2020, second 3 weeks ago), HTN, HLD, Prediabetes,BPH was brought in from NH for unresponsiveness.   ED course: Pt was unresponsive so was intubated in The ED and Femoral line was placed by the ED physician       Assessment:  1. Acute Hypoxic Respiratory Failure     2. Acute Encephalopathy   3. Septic shock   4. CVA   5. pre-diabetes   6. HTN   7. BPH     Plan;  -off pressors   -decrease sedation   -continue vent support  -adjust vent as per ABG  -sedated and pain control   -hemodynamic support  -monitor blood sugar   -pressors to maintain MAP>65  -monitor biomarkers daily  -change to lovenox therapeutic .

## 2021-02-24 NOTE — PROGRESS NOTE ADULT - SUBJECTIVE AND OBJECTIVE BOX
Patient is a 75y old  Male who presents with a chief complaint of Unresponsive (24 Feb 2021 10:00)/AMS/metabolic encephalopathy/septic shock/respiratory failure/intubation/shock liver, continue supportive care/pressor/IV ABS, palliative follow up      INTERVAL HPI/OVERNIGHT EVENTS:  T(C): 36.9 (02-24-21 @ 09:00), Max: 37.8 (02-23-21 @ 20:00)  HR: 71 (02-24-21 @ 11:00) (60 - 88)  BP: 109/61 (02-24-21 @ 11:00) (80/56 - 139/70)  RR: 18 (02-24-21 @ 11:00) (18 - 22)  SpO2: 96% (02-24-21 @ 11:00) (91% - 97%)  Wt(kg): --    LABS:                        12.4   6.74  )-----------( 153      ( 24 Feb 2021 04:43 )             38.2     02-24    145  |  111<H>  |  38<H>  ----------------------------<  133<H>  4.3   |  29  |  1.23    Ca    8.8      24 Feb 2021 04:43  Phos  2.8     02-24  Mg     2.2     02-24    TPro  6.2  /  Alb  2.0<L>  /  TBili  0.5  /  DBili  x   /  AST  105<H>  /  ALT  198<H>  /  AlkPhos  243<H>  02-24    PT/INR - ( 24 Feb 2021 04:43 )   PT: 14.5 sec;   INR: 1.23 ratio             CAPILLARY BLOOD GLUCOSE      POCT Blood Glucose.: 124 mg/dL (24 Feb 2021 10:50)  POCT Blood Glucose.: 133 mg/dL (24 Feb 2021 04:36)  POCT Blood Glucose.: 117 mg/dL (24 Feb 2021 00:06)  POCT Blood Glucose.: 119 mg/dL (23 Feb 2021 16:09)    ABG - ( 24 Feb 2021 03:39 )  pH, Arterial: 7.47  pH, Blood: x     /  pCO2: 39    /  pO2: 79    / HCO3: 28    / Base Excess: 4.3   /  SaO2: 96                  RADIOLOGY & ADDITIONAL TESTS:    Consultant(s) Notes Reviewed:  [x ] YES  [ ] NO    PHYSICAL EXAM:  GENERAL: well built, well nourished  HEAD:  Atraumatic, Normocephalic  EYES: EOMI, PERRLA, conjunctiva and sclera clear  ENT: No tonsillar erythema, exudates, or enlargement; Moist mucous membranes, Good dentition, No lesions, on vent/NG tube in place  NECK: Supple, No JVD, Normal thyroid, no enlarged nodes  NERVOUS SYSTEM:  sedated  CHEST/LUNG: B/L good air entry; No rales, rhonchi, or wheezing  HEART: S1S2 normal, no S3, Regular rate and rhythm; No murmurs, rubs, or gallops  ABDOMEN: Soft, Nontender, Nondistended; Bowel sounds present  EXTREMITIES:  2+ Peripheral Pulses, No clubbing, cyanosis, positive  edema  LYMPH: No lymphadenopathy noted  SKIN: No rashes or lesions    Care Discussed with Consultants/Other Providers [ x] YES  [ ] NO

## 2021-02-24 NOTE — PROGRESS NOTE ADULT - SUBJECTIVE AND OBJECTIVE BOX
ICU VISIT  75y Male    Meds:  meropenem  IVPB 1000 milliGRAM(s) IV Intermittent every 8 hours    Allergies    No Known Allergies    Intolerances        VITALS:  Vital Signs Last 24 Hrs  T(C): 37.6 (24 Feb 2021 15:08), Max: 37.8 (23 Feb 2021 20:00)  T(F): 99.6 (24 Feb 2021 15:08), Max: 100 (23 Feb 2021 20:00)  HR: 77 (24 Feb 2021 16:22) (60 - 109)  BP: 109/71 (24 Feb 2021 16:00) (93/54 - 144/89)  BP(mean): 83 (24 Feb 2021 16:00) (66 - 105)  RR: 18 (24 Feb 2021 16:00) (18 - 34)  SpO2: 95% (24 Feb 2021 16:22) (91% - 97%)    LABS/DIAGNOSTIC TESTS:                          12.4   6.74  )-----------( 153      ( 24 Feb 2021 04:43 )             38.2         02-24    145  |  111<H>  |  38<H>  ----------------------------<  133<H>  4.3   |  29  |  1.23    Ca    8.8      24 Feb 2021 04:43  Phos  2.8     02-24  Mg     2.2     02-24    TPro  6.2  /  Alb  2.0<L>  /  TBili  0.5  /  DBili  x   /  AST  105<H>  /  ALT  198<H>  /  AlkPhos  243<H>  02-24      LIVER FUNCTIONS - ( 24 Feb 2021 04:43 )  Alb: 2.0 g/dL / Pro: 6.2 g/dL / ALK PHOS: 243 U/L / ALT: 198 U/L DA / AST: 105 U/L / GGT: x             CULTURES: .Blood Blood-Peripheral  02-21 @ 12:05   No growth to date.  --  --      .Blood Blood-Peripheral  02-21 @ 12:04   No growth to date.  --  --      .Blood Blood-Peripheral  02-19 @ 03:00   Growth in anaerobic bottle: Enterococcus faecalis      .Urine Clean Catch (Midstream)  02-19 @ 02:34   50,000 - 99,000 CFU/mL Klebsiella pneumoniae  --  Klebsiella pneumoniae            RADIOLOGY:< from: Xray Chest 1 View- PORTABLE-Routine (Xray Chest 1 View- PORTABLE-Routine in AM.) (02.24.21 @ 09:53) >  EXAM:  XR CHEST PORTABLE ROUTINE 1V                            PROCEDURE DATE:  02/24/2021          INTERPRETATION:  CLINICAL STATEMENT: Follow-up chest pain.    TECHNIQUE: AP view of the chest.    COMPARISON: 2/23/2021    FINDINGS/  IMPRESSION:  ET tube, feeding tube, left central line again noted. No pneumothorax.    Small bilateral pleural effusions and/or adjacent mild opacities. Mild opacity right midlung zone.    Heart size cannot be accurately assessed in this projection.              VINH THOMAS MD; Attending Radiologist  This document has been electronically signed. Feb 24 2021  1:21PM    < end of copied text >        ROS:  [  ] UNABLE TO ELICIT ICU VISIT  75y Male who remains in the ICU, he is sedated , intubated and vent dependent but did fairly well this morning with a SBT for about 2 hours. He has some low grade fever only and WBC count is WNL. He is opening his eyes a little to tactile stimuli. Repeat blood cultures remain negative to date. His anasarca has improved also.    Meds:  meropenem  IVPB 1000 milliGRAM(s) IV Intermittent every 8 hours    Allergies    No Known Allergies    Intolerances        VITALS:  Vital Signs Last 24 Hrs  T(C): 37.6 (24 Feb 2021 15:08), Max: 37.8 (23 Feb 2021 20:00)  T(F): 99.6 (24 Feb 2021 15:08), Max: 100 (23 Feb 2021 20:00)  HR: 77 (24 Feb 2021 16:22) (60 - 109)  BP: 109/71 (24 Feb 2021 16:00) (93/54 - 144/89)  BP(mean): 83 (24 Feb 2021 16:00) (66 - 105)  RR: 18 (24 Feb 2021 16:00) (18 - 34)  SpO2: 95% (24 Feb 2021 16:22) (91% - 97%)    LABS/DIAGNOSTIC TESTS:                          12.4   6.74  )-----------( 153      ( 24 Feb 2021 04:43 )             38.2         02-24    145  |  111<H>  |  38<H>  ----------------------------<  133<H>  4.3   |  29  |  1.23    Ca    8.8      24 Feb 2021 04:43  Phos  2.8     02-24  Mg     2.2     02-24    TPro  6.2  /  Alb  2.0<L>  /  TBili  0.5  /  DBili  x   /  AST  105<H>  /  ALT  198<H>  /  AlkPhos  243<H>  02-24      LIVER FUNCTIONS - ( 24 Feb 2021 04:43 )  Alb: 2.0 g/dL / Pro: 6.2 g/dL / ALK PHOS: 243 U/L / ALT: 198 U/L DA / AST: 105 U/L / GGT: x             CULTURES: .Blood Blood-Peripheral  02-21 @ 12:05   No growth to date.  --  --      .Blood Blood-Peripheral  02-21 @ 12:04   No growth to date.  --  --      .Blood Blood-Peripheral  02-19 @ 03:00   Growth in anaerobic bottle: Enterococcus faecalis      .Urine Clean Catch (Midstream)  02-19 @ 02:34   50,000 - 99,000 CFU/mL Klebsiella pneumoniae  --  Klebsiella pneumoniae            RADIOLOGY:< from: Xray Chest 1 View- PORTABLE-Routine (Xray Chest 1 View- PORTABLE-Routine in AM.) (02.24.21 @ 09:53) >  EXAM:  XR CHEST PORTABLE ROUTINE 1V                            PROCEDURE DATE:  02/24/2021          INTERPRETATION:  CLINICAL STATEMENT: Follow-up chest pain.    TECHNIQUE: AP view of the chest.    COMPARISON: 2/23/2021    FINDINGS/  IMPRESSION:  ET tube, feeding tube, left central line again noted. No pneumothorax.    Small bilateral pleural effusions and/or adjacent mild opacities. Mild opacity right midlung zone.    Heart size cannot be accurately assessed in this projection.              VINH THOMAS MD; Attending Radiologist  This document has been electronically signed. Feb 24 2021  1:21PM    < end of copied text >        ROS:  [ x ] UNABLE TO ELICIT

## 2021-02-24 NOTE — PROGRESS NOTE ADULT - CONSTITUTIONAL DETAILS
well-developed/well-groomed/well-nourished/no distress/obese
well-developed/well-nourished/no distress/obese

## 2021-02-24 NOTE — CHART NOTE - NSCHARTNOTEFT_GEN_A_CORE
Patient's wife/Ashish ([phone]) , the primary surrogate was contacted by polish  ID [phone]. patient did not  the phone , left her a voice message via polish .

## 2021-02-24 NOTE — PROGRESS NOTE ADULT - RS GEN PE MLT RESP DETAILS PC
breath sounds equal/good air movement/no rhonchi/no wheezes/rales
breath sounds equal/good air movement/no rhonchi/no wheezes/rales

## 2021-02-24 NOTE — PROGRESS NOTE ADULT - GASTROINTESTINAL DETAILS
soft/no distention/bowel sounds normal/no guarding/no rigidity
soft/no distention/bowel sounds normal/no guarding/no rigidity

## 2021-02-25 LAB
ALBUMIN SERPL ELPH-MCNC: 2.1 G/DL — LOW (ref 3.5–5)
ALP SERPL-CCNC: 213 U/L — HIGH (ref 40–120)
ALT FLD-CCNC: 164 U/L DA — HIGH (ref 10–60)
ANION GAP SERPL CALC-SCNC: 1 MMOL/L — LOW (ref 5–17)
AST SERPL-CCNC: 100 U/L — HIGH (ref 10–40)
BASE EXCESS BLDA CALC-SCNC: 3.1 MMOL/L — HIGH (ref -2–2)
BILIRUB SERPL-MCNC: 0.6 MG/DL — SIGNIFICANT CHANGE UP (ref 0.2–1.2)
BLOOD GAS COMMENTS ARTERIAL: SIGNIFICANT CHANGE UP
BUN SERPL-MCNC: 41 MG/DL — HIGH (ref 7–18)
CALCIUM SERPL-MCNC: 8.8 MG/DL — SIGNIFICANT CHANGE UP (ref 8.4–10.5)
CHLORIDE SERPL-SCNC: 113 MMOL/L — HIGH (ref 96–108)
CO2 SERPL-SCNC: 30 MMOL/L — SIGNIFICANT CHANGE UP (ref 22–31)
CREAT SERPL-MCNC: 1.14 MG/DL — SIGNIFICANT CHANGE UP (ref 0.5–1.3)
GLUCOSE BLDC GLUCOMTR-MCNC: 115 MG/DL — HIGH (ref 70–99)
GLUCOSE BLDC GLUCOMTR-MCNC: 120 MG/DL — HIGH (ref 70–99)
GLUCOSE BLDC GLUCOMTR-MCNC: 122 MG/DL — HIGH (ref 70–99)
GLUCOSE BLDC GLUCOMTR-MCNC: 131 MG/DL — HIGH (ref 70–99)
GLUCOSE SERPL-MCNC: 110 MG/DL — HIGH (ref 70–99)
HCO3 BLDA-SCNC: 25 MMOL/L — SIGNIFICANT CHANGE UP (ref 23–27)
HCT VFR BLD CALC: 38.1 % — LOW (ref 39–50)
HGB BLD-MCNC: 12.5 G/DL — LOW (ref 13–17)
HOROWITZ INDEX BLDA+IHG-RTO: 40 — SIGNIFICANT CHANGE UP
INR BLD: 1.29 RATIO — HIGH (ref 0.88–1.16)
MAGNESIUM SERPL-MCNC: 2.4 MG/DL — SIGNIFICANT CHANGE UP (ref 1.6–2.6)
MCHC RBC-ENTMCNC: 32.1 PG — SIGNIFICANT CHANGE UP (ref 27–34)
MCHC RBC-ENTMCNC: 32.8 GM/DL — SIGNIFICANT CHANGE UP (ref 32–36)
MCV RBC AUTO: 97.9 FL — SIGNIFICANT CHANGE UP (ref 80–100)
NRBC # BLD: 0 /100 WBCS — SIGNIFICANT CHANGE UP (ref 0–0)
PCO2 BLDA: 32 MMHG — SIGNIFICANT CHANGE UP (ref 32–46)
PH BLDA: 7.51 — HIGH (ref 7.35–7.45)
PHOSPHATE SERPL-MCNC: 3.3 MG/DL — SIGNIFICANT CHANGE UP (ref 2.5–4.5)
PLATELET # BLD AUTO: 172 K/UL — SIGNIFICANT CHANGE UP (ref 150–400)
PO2 BLDA: 74 MMHG — SIGNIFICANT CHANGE UP (ref 74–108)
POTASSIUM SERPL-MCNC: 4.4 MMOL/L — SIGNIFICANT CHANGE UP (ref 3.5–5.3)
POTASSIUM SERPL-SCNC: 4.4 MMOL/L — SIGNIFICANT CHANGE UP (ref 3.5–5.3)
PROT SERPL-MCNC: 6.5 G/DL — SIGNIFICANT CHANGE UP (ref 6–8.3)
PROTHROM AB SERPL-ACNC: 15.2 SEC — HIGH (ref 10.6–13.6)
RBC # BLD: 3.89 M/UL — LOW (ref 4.2–5.8)
RBC # FLD: 12.4 % — SIGNIFICANT CHANGE UP (ref 10.3–14.5)
SAO2 % BLDA: 95 % — SIGNIFICANT CHANGE UP (ref 92–96)
SODIUM SERPL-SCNC: 144 MMOL/L — SIGNIFICANT CHANGE UP (ref 135–145)
TRIGL SERPL-MCNC: 141 MG/DL — SIGNIFICANT CHANGE UP
WBC # BLD: 6.74 K/UL — SIGNIFICANT CHANGE UP (ref 3.8–10.5)
WBC # FLD AUTO: 6.74 K/UL — SIGNIFICANT CHANGE UP (ref 3.8–10.5)

## 2021-02-25 PROCEDURE — 71045 X-RAY EXAM CHEST 1 VIEW: CPT | Mod: 26

## 2021-02-25 RX ORDER — CEFTRIAXONE 500 MG/1
1000 INJECTION, POWDER, FOR SOLUTION INTRAMUSCULAR; INTRAVENOUS ONCE
Refills: 0 | Status: COMPLETED | OUTPATIENT
Start: 2021-02-25 | End: 2021-02-25

## 2021-02-25 RX ORDER — CEFTRIAXONE 500 MG/1
INJECTION, POWDER, FOR SOLUTION INTRAMUSCULAR; INTRAVENOUS
Refills: 0 | Status: COMPLETED | OUTPATIENT
Start: 2021-02-25 | End: 2021-03-03

## 2021-02-25 RX ORDER — CEFTRIAXONE 500 MG/1
1000 INJECTION, POWDER, FOR SOLUTION INTRAMUSCULAR; INTRAVENOUS EVERY 24 HOURS
Refills: 0 | Status: COMPLETED | OUTPATIENT
Start: 2021-02-26 | End: 2021-03-02

## 2021-02-25 RX ADMIN — MEROPENEM 100 MILLIGRAM(S): 1 INJECTION INTRAVENOUS at 04:19

## 2021-02-25 RX ADMIN — ENOXAPARIN SODIUM 120 MILLIGRAM(S): 100 INJECTION SUBCUTANEOUS at 05:12

## 2021-02-25 RX ADMIN — Medication 81 MILLIGRAM(S): at 11:56

## 2021-02-25 RX ADMIN — ATORVASTATIN CALCIUM 80 MILLIGRAM(S): 80 TABLET, FILM COATED ORAL at 21:22

## 2021-02-25 RX ADMIN — MIDODRINE HYDROCHLORIDE 10 MILLIGRAM(S): 2.5 TABLET ORAL at 21:23

## 2021-02-25 RX ADMIN — ENOXAPARIN SODIUM 120 MILLIGRAM(S): 100 INJECTION SUBCUTANEOUS at 17:01

## 2021-02-25 RX ADMIN — Medication 650 MILLIGRAM(S): at 05:13

## 2021-02-25 RX ADMIN — CHLORHEXIDINE GLUCONATE 15 MILLILITER(S): 213 SOLUTION TOPICAL at 04:19

## 2021-02-25 RX ADMIN — MIDODRINE HYDROCHLORIDE 10 MILLIGRAM(S): 2.5 TABLET ORAL at 04:19

## 2021-02-25 RX ADMIN — CHLORHEXIDINE GLUCONATE 15 MILLILITER(S): 213 SOLUTION TOPICAL at 17:02

## 2021-02-25 RX ADMIN — CEFTRIAXONE 100 MILLIGRAM(S): 500 INJECTION, POWDER, FOR SOLUTION INTRAMUSCULAR; INTRAVENOUS at 21:44

## 2021-02-25 RX ADMIN — DEXMEDETOMIDINE HYDROCHLORIDE IN 0.9% SODIUM CHLORIDE 15.1 MICROGRAM(S)/KG/HR: 4 INJECTION INTRAVENOUS at 00:09

## 2021-02-25 RX ADMIN — CHLORHEXIDINE GLUCONATE 1 APPLICATION(S): 213 SOLUTION TOPICAL at 04:20

## 2021-02-25 RX ADMIN — MEROPENEM 100 MILLIGRAM(S): 1 INJECTION INTRAVENOUS at 13:47

## 2021-02-25 RX ADMIN — PANTOPRAZOLE SODIUM 40 MILLIGRAM(S): 20 TABLET, DELAYED RELEASE ORAL at 11:55

## 2021-02-25 RX ADMIN — MIDODRINE HYDROCHLORIDE 10 MILLIGRAM(S): 2.5 TABLET ORAL at 13:47

## 2021-02-25 NOTE — PROGRESS NOTE ADULT - ASSESSMENT
75 Y M from [facility] rehab, non ambulatory with PMhx of CVA( twice first in July 2020, second 3 weeks ago), HTN, HLD, Prediabetes,BPH was brought in from NH for unresponsiveness.   ED course: Pt was unresponsive so was intubated in The ED and Femoral line was placed by the ED physician   Vitals : Febrile to 103 F , , /108  Labs : wbc 6k ,lactate 1.2, Bun/CR 40/1.56  T1 0.018  ECG : NSR   CXR : clear    Pt will be admitted to ICU for Acute hypoxic Respiratory Failure and Septic shock         Assessment:  1. Acute Hypoxic Respiratory Failure     2. Acute Encephalopathy   3. Septic shock   4. CVA   5. pre-diabetes   6. HTN   7. BPH     Plan:    =====================[CNS ]==============================  # Acute Encephalopathy :    - recent stroke 3 weeks ago , alert ,oriented and follows commands as per NH papers at baseline, confused and memory loss after stroke as per wife   -  CTH no acute event ,Age-indeterminate left PCA territory infarction.  -on precedex , titrated today     # CVA :   - L post cerebral A stroke as per NH papers   - Pt is on Aspirin, FD lovenox and statins        =====================[CVS ]==============================  # Septic shock :   - likely 2/2 Urosepsis   - UA +ve   - c/w vasopressor support   -  on Meropenem 1g q12 for now , vanc  - BCx pos to enterococci and CONS  ,UCx pos to klebsiella  - repeat cultures neg till date       # HTN :   - pt is on toprol, losartan and nifedipine   - Hold BP medications   - c/w vasopressors for BP support - on levo  -  Echo grade 1 diastolic dysfuction,    =====================[RESP ]==============================  # Acute Hypoxic Resp Failure :   - Pt was intubated as pt was unresponsive   - CXR Clear   - CT chest showed Right middle lobe as well as patchy bibasilar opacities.    - c/w [facility] vent   -SBT today     =====================[ GI ]================================  # No issues :   - NGT in place   -TF     ====================[ RENAL ]=============================   # URSULA over CKD :   - baseline Cr around 1.4   - FC in place   - monitor urine output   - I&Os  - Monitor electrolytes   -given albumin    =====================[ ID ]================================  # Urosepsis :   -  febrile to 103 F, no wbc count or lactate , CXR clear   - UA +ve   urine culture klebsiella  - s/p 1 dose of vanco and zosyn in the ED   - will c/w meropenem       ===================[ HEME/ONC ]===========================  # No issues :  - Hb and Plt stable   - monitor cbc daily     =====================[ ENDO ]=============================  # Pre -diabetes : -  - Last A1c 6.2   - target CBG < 180  - FS q6 while NPO, will start on ISS       ==================[ SKIN/ CATHETERS ]======================  # no wound or skin break   # R fem line -  - LIJ placed,  ==================[ PROPHYLAXIS ]==========================   # Dvt : full dose lovenox  # Gi : Protonix     ====================[ DISPO ]==============================   - Monitor in ICU     ===================[ PROGNOSIS ]===========================  - Guarded      75 Y M from [facility] rehab, non ambulatory with PMhx of CVA( twice first in July 2020, second 3 weeks ago), HTN, HLD, Prediabetes,BPH was brought in from NH for unresponsiveness.   ED course: Pt was unresponsive so was intubated in The ED and Femoral line was placed by the ED physician   Vitals : Febrile to 103 F , , /108  Labs : wbc 6k ,lactate 1.2, Bun/CR 40/1.56  T1 0.018  ECG : NSR   CXR : clear    Pt will be admitted to ICU for Acute hypoxic Respiratory Failure and Septic shock         Assessment:  1. Acute Hypoxic Respiratory Failure     2. Acute Encephalopathy   3. Septic shock 2/2 UTI  4. CVA   5. pre-diabetes   6. HTN   7. BPH     Plan:    =====================[CNS ]==============================  # Acute Encephalopathy :    - recent stroke 3 weeks ago , alert ,oriented and follows commands as per NH papers at baseline, confused and memory loss after stroke as per wife   -  CTH no acute event ,Age-indeterminate left PCA territory infarction.  -On minimal precedex with some mental status, able to move extremities.     # CVA :   - L post cerebral A stroke as per NH papers   - Pt is on Aspirin, Full Dose lovenox(in place of NOAC) and statin        =====================[CVS ]==============================  # Septic shock :   - likely 2/2 Urosepsis   - UA +ve   - On midodrine, dced levophed  -  on Meropenem 1g q12 for now , vanc dced  - Repeat Cultures negative to date  - Initial culture with enterococcus faecalis and Coag Neg staph, Urine culture with Klebsiella      # HTN :   - pt is on toprol, losartan and nifedipine   - Hold BP medications   - Levo dced, on midodrine  -  Echo grade 1 diastolic dysfuction,    =====================[RESP ]==============================  # Acute Hypoxic Resp Failure :   - Vent settings: 18/450/40/5  - ABG this AM with slight alkalosis  - minimal oxygen requirements, SBT for 2 hours today then started getting tachypneic and hypoxic.  - Will continue with SBT daily    =====================[ GI ]================================  # No issues :   - NGT in place   -TF     ====================[ RENAL ]=============================   # URSULA over CKD :   - Improved  =====================[ ID ]================================  # Urosepsis :   -  febrile to 103 F, no wbc count or lactate , CXR clear   - UA +ve   urine culture klebsiella  - will c/w meropenem  - Repeat cultures negative to date       ===================[ HEME/ONC ]===========================  # No issues :  - Hb and Plt stable   - monitor cbc daily     =====================[ ENDO ]=============================  # Pre -diabetes : -  - Last A1c 6.2   - target CBG < 180  - FS q6 while NPO, will start on ISS   - blood glucose well controlled    ==================[ SKIN/ CATHETERS ]======================  # no wound or skin break   - LIJ placed 2/21  ==================[ PROPHYLAXIS ]==========================   # Dvt : full dose lovenox  # Gi : Protonix     ====================[ DISPO ]==============================   - Monitor in ICU     ===================[ PROGNOSIS ]===========================  - Guarded

## 2021-02-25 NOTE — PHYSICAL THERAPY INITIAL EVALUATION ADULT - PASSIVE RANGE OF MOTION EXAMINATION, REHAB EVAL
pt exhibits nonverbal indicator of pain when RLE hip and knee are flexed/bilateral upper extremity Passive ROM was WNL (within normal limits)/bilateral lower extremity Passive ROM was WNL

## 2021-02-25 NOTE — PHYSICAL THERAPY INITIAL EVALUATION ADULT - ORIENTATION, REHAB EVAL
Pt responds to his name, turns head and nods head when called. Unable to assess anything else due to pt being on mechanical ventilation./person

## 2021-02-25 NOTE — PROGRESS NOTE ADULT - SUBJECTIVE AND OBJECTIVE BOX
Patient is a 75y old  Male who presents with a chief complaint of Unresponsive (25 Feb 2021 07:03)/metabolic encephalopathy/septic shock/shock liver/aspiration PNA/pyelo(?)/intubation, weaning off sedation, weaning off vent, more awake      INTERVAL HPI/OVERNIGHT EVENTS:  T(C): 37.3 (02-25-21 @ 08:00), Max: 37.8 (02-25-21 @ 04:00)  HR: 77 (02-25-21 @ 10:00) (63 - 109)  BP: 124/84 (02-25-21 @ 10:00) (93/61 - 144/89)  RR: 27 (02-25-21 @ 10:00) (18 - 34)  SpO2: 97% (02-25-21 @ 10:00) (94% - 97%)  Wt(kg): --    LABS:                        12.5   6.74  )-----------( 172      ( 25 Feb 2021 03:42 )             38.1     02-25    144  |  113<H>  |  41<H>  ----------------------------<  110<H>  4.4   |  30  |  1.14    Ca    8.8      25 Feb 2021 03:42  Phos  3.3     02-25  Mg     2.4     02-25    TPro  6.5  /  Alb  2.1<L>  /  TBili  0.6  /  DBili  x   /  AST  100<H>  /  ALT  164<H>  /  AlkPhos  213<H>  02-25    PT/INR - ( 25 Feb 2021 03:42 )   PT: 15.2 sec;   INR: 1.29 ratio             CAPILLARY BLOOD GLUCOSE      POCT Blood Glucose.: 131 mg/dL (25 Feb 2021 05:14)  POCT Blood Glucose.: 133 mg/dL (24 Feb 2021 23:49)  POCT Blood Glucose.: 114 mg/dL (24 Feb 2021 16:10)  POCT Blood Glucose.: 124 mg/dL (24 Feb 2021 10:50)    ABG - ( 25 Feb 2021 04:04 )  pH, Arterial: 7.51  pH, Blood: x     /  pCO2: 32    /  pO2: 74    / HCO3: 25    / Base Excess: 3.1   /  SaO2: 95                  RADIOLOGY & ADDITIONAL TESTS:    Consultant(s) Notes Reviewed:  [x ] YES  [ ] NO    PHYSICAL EXAM:  GENERAL: well built, well nourished  HEAD:  Atraumatic, Normocephalic  EYES: EOMI, PERRLA, conjunctiva and sclera clear  ENT: No tonsillar erythema, exudates, or enlargement; Moist mucous membranes, Good dentition, No lesions, on vent/NG tube in place  NECK: Supple, No JVD, Normal thyroid, no enlarged nodes  NERVOUS SYSTEM:  more awake  CHEST/LUNG: B/L good air entry; No rales, rhonchi, or wheezing  HEART: S1S2 normal, no S3, Regular rate and rhythm; No murmurs, rubs, or gallops  ABDOMEN: Soft, Nontender, Nondistended; Bowel sounds present  EXTREMITIES:  2+ Peripheral Pulses, No clubbing, cyanosis, positive edema  LYMPH: No lymphadenopathy noted  SKIN: No rashes or lesions    Care Discussed with Consultants/Other Providers [ x] YES  [ ] NO

## 2021-02-25 NOTE — PHYSICAL THERAPY INITIAL EVALUATION ADULT - GENERAL OBSERVATIONS, REHAB EVAL
Pt. received supine in bed, NAD, +vent, +IV with multiple lines, +NG tube, +jackson, +central line +SCDs, +BLE limb protectors.

## 2021-02-25 NOTE — PHYSICAL THERAPY INITIAL EVALUATION ADULT - FOLLOWS COMMANDS/ANSWERS QUESTIONS, REHAB EVAL
Pt responds to simple tasks such as "squeeze my hand."/able to follow single-step instructions/unable to answer questions

## 2021-02-25 NOTE — PROGRESS NOTE ADULT - ASSESSMENT
Pneumonia with respiratory failure - likely aspiration  Low grade temps  Bacteremia - likely contaminants , meropenem will cover them as well.  ?UTI  ·	Cont Meropenem 1 gm iv q 8hrs  D7  Time spent - 31 mins

## 2021-02-25 NOTE — PROGRESS NOTE ADULT - ATTENDING COMMENTS
IMP: This is a 75 yr old man from [facility], non ambulatory with  CVA( twice first in July 2020, second 3 weeks ago), HTN, HLD, Prediabetes,BPH was brought in from NH for unresponsiveness.   ED course: Pt was unresponsive so was intubated in The ED and Femoral line was placed by the ED physician       Assessment:  1. Acute Hypoxic Respiratory Failure     2. Acute Encephalopathy   3. Septic shock   4. CVA   5. pre-diabetes   6. HTN   7. BPH     Plan;  -stated on SBT, tolerated 2 hr on PS 12  -continue daily SBT  -off pressors   -decrease sedation   -continue vent support  -adjust vent as per ABG  -sedated and pain control   -hemodynamic support  -monitor blood sugar   -pressors to maintain MAP>65  -monitor biomarkers daily  -change to lovenox therapeutic .

## 2021-02-25 NOTE — PROGRESS NOTE ADULT - ASSESSMENT
75 yr old male fom NH, H/O CVA recent/dementia/HTN/BPH, admit hospital for unresponsive, intubated at ER, CT chest/ABD showed PNA/aspiration(?)/blood culture positive, start pressor for septic shock//IV ABS, F/U pan culture result, ICU care, close monitor lab, titrate pressor, elevated LFT, shock liver(?), close monitor LFT  stable,  continue ICU care, vent support, DX unresponsive/acute hypoxic respiratory failure/intubatiob/septic shock/aspiration PNA/shock liver/pyelonephritis, weaning off sedation, more awake, weaning off vent.

## 2021-02-25 NOTE — PHYSICAL THERAPY INITIAL EVALUATION ADULT - CRITERIA FOR SKILLED THERAPEUTIC INTERVENTIONS
impairments found/functional limitations in following categories/risk reduction/prevention/rehab potential/anticipated discharge recommendation

## 2021-02-25 NOTE — PHYSICAL THERAPY INITIAL EVALUATION ADULT - ADDITIONAL COMMENTS
Per H&P, pt ambulated with RW after 1st stroke (July 2020) but nonambulatory after 2nd stroke (3 weeks ago.)

## 2021-02-25 NOTE — PROGRESS NOTE ADULT - SUBJECTIVE AND OBJECTIVE BOX
ICU visit:  75y Male is under our care for aspiration pneumonia with respiratory failure, bacteremia, and ?UTI, s/p septic shock. Patient remains intubated, but is awake and able to follow some commands. Patient is also no longer on pressors.  Repeat BC remain negative. Wbc count is normal, but patient is still having scattered low grade temps.     REVIEW OF SYSTEMS:  [ X ] Not able to illicit    MEDS:  meropenem  IVPB 1000 milliGRAM(s) IV Intermittent every 8 hours    ALLERGIES: Allergies    No Known Allergies    Intolerances      VITALS:  ICU Vital Signs Last 24 Hrs  T(C): 36.7 (25 Feb 2021 16:00), Max: 37.8 (25 Feb 2021 04:00)  T(F): 98.1 (25 Feb 2021 16:00), Max: 100.1 (25 Feb 2021 04:00)  HR: 65 (25 Feb 2021 20:11) (58 - 86)  BP: 96/62 (25 Feb 2021 19:00) (79/60 - 158/74)  BP(mean): 74 (25 Feb 2021 19:00) (18 - 97)  ABP: --  ABP(mean): --  RR: 18 (25 Feb 2021 19:00) (14 - 27)  SpO2: 98% (25 Feb 2021 20:11) (88% - 98%)        PHYSICAL EXAM:  HEENT: +vent via ETT +salem sump  Neck: supple no LN's, left neck CVP  Respiratory: bilateral diminished breath sounds no rales  Cardiovascular: S1 S2 geovany no murmurs  Gastrointestinal: +BS with soft, large abdominal pannus; nontender  +jackson  Extremities: BUE edema  Skin: no rashes  Ortho: n/a  Neuro: sedated      LABS/DIAGNOSTIC TESTS:                        12.5   6.74  )-----------( 172      ( 25 Feb 2021 03:42 )             38.1   WBC Count: 6.74 K/uL (02-25 @ 03:42)  WBC Count: 6.74 K/uL (02-24 @ 04:43)  WBC Count: 5.98 K/uL (02-23 @ 04:58)  WBC Count: 5.95 K/uL (02-22 @ 05:54)  WBC Count: 5.54 K/uL (02-21 @ 05:53)    02-25    144  |  113<H>  |  41<H>  ----------------------------<  110<H>  4.4   |  30  |  1.14    Ca    8.8      25 Feb 2021 03:42  Phos  3.3     02-25  Mg     2.4     02-25    TPro  6.5  /  Alb  2.1<L>  /  TBili  0.6  /  DBili  x   /  AST  100<H>  /  ALT  164<H>  /  AlkPhos  213<H>  02-25    ABG - ( 25 Feb 2021 04:04 )  pH, Arterial: 7.51  pH, Blood: x     /  pCO2: 32    /  pO2: 74    / HCO3: 25    / Base Excess: 3.1   /  SaO2: 95            CULTURES:   .Blood Blood-Peripheral  02-21 @ 12:05   No growth to date.  --  --      .Blood Blood-Peripheral  02-21 @ 12:04   No growth to date.  --  --      .Blood Blood-Peripheral  02-19 @ 03:00   Growth in anaerobic bottle: Enterococcus faecalis  ***Blood Panel PCR results on this specimen are available  approximately 3 hours after the Gram stain result.***  Gram stain, PCR, and/or culture results may not always  correspond due to difference in methodologies.  ************************************************************  This PCR assay was performed by multiplex PCR. This  Assay tests for 66 bacterial and resistance gene targets.  Please refer to the [facility] test directory  at [email or web address] for details.  --  Blood Culture PCR  Enterococcus faecalis      .Urine Clean Catch (Midstream)  02-19 @ 02:34   50,000 - 99,000 CFU/mL Klebsiella pneumoniae  --  Klebsiella pneumoniae        RADIOLOGY:  no new studies ICU visit:  75y Male is under our care for aspiration pneumonia with respiratory failure, bacteremia, and ?UTI, s/p septic shock. Patient remains intubated, but is awake and able to follow some commands. Patient is also no longer on pressors.  Repeat BC remain negative. Wbc count is normal, but patient is still having scattered low grade temps.     REVIEW OF SYSTEMS:  [ X ] Not able to illicit    MEDS:  meropenem  IVPB 1000 milliGRAM(s) IV Intermittent every 8 hours    ALLERGIES: Allergies    No Known Allergies    Intolerances      VITALS:  ICU Vital Signs Last 24 Hrs  T(C): 36.7 (25 Feb 2021 16:00), Max: 37.8 (25 Feb 2021 04:00)  T(F): 98.1 (25 Feb 2021 16:00), Max: 100.1 (25 Feb 2021 04:00)  HR: 65 (25 Feb 2021 20:11) (58 - 86)  BP: 96/62 (25 Feb 2021 19:00) (79/60 - 158/74)  BP(mean): 74 (25 Feb 2021 19:00) (18 - 97)  ABP: --  ABP(mean): --  RR: 18 (25 Feb 2021 19:00) (14 - 27)  SpO2: 98% (25 Feb 2021 20:11) (88% - 98%)        PHYSICAL EXAM:  HEENT: +vent via ETT +salem sump  Neck: supple no LN's, left neck CVP  Respiratory: bilateral diminished breath sounds no rales  Cardiovascular: S1 S2 geovany no murmurs  Gastrointestinal: +BS with soft, large abdominal pannus; nontender  +jackson  Extremities: BUE edema  Skin: no rashes  Ortho: no jt swelling or erythema  Neuro: sedated      LABS/DIAGNOSTIC TESTS:                        12.5   6.74  )-----------( 172      ( 25 Feb 2021 03:42 )             38.1   WBC Count: 6.74 K/uL (02-25 @ 03:42)  WBC Count: 6.74 K/uL (02-24 @ 04:43)  WBC Count: 5.98 K/uL (02-23 @ 04:58)  WBC Count: 5.95 K/uL (02-22 @ 05:54)  WBC Count: 5.54 K/uL (02-21 @ 05:53)    02-25    144  |  113<H>  |  41<H>  ----------------------------<  110<H>  4.4   |  30  |  1.14    Ca    8.8      25 Feb 2021 03:42  Phos  3.3     02-25  Mg     2.4     02-25    TPro  6.5  /  Alb  2.1<L>  /  TBili  0.6  /  DBili  x   /  AST  100<H>  /  ALT  164<H>  /  AlkPhos  213<H>  02-25    ABG - ( 25 Feb 2021 04:04 )  pH, Arterial: 7.51  pH, Blood: x     /  pCO2: 32    /  pO2: 74    / HCO3: 25    / Base Excess: 3.1   /  SaO2: 95            CULTURES:   .Blood Blood-Peripheral  02-21 @ 12:05   No growth to date.  --  --      .Blood Blood-Peripheral  02-21 @ 12:04   No growth to date.  --  --      .Blood Blood-Peripheral  02-19 @ 03:00   Growth in anaerobic bottle: Enterococcus faecalis  ***Blood Panel PCR results on this specimen are available  approximately 3 hours after the Gram stain result.***  Gram stain, PCR, and/or culture results may not always  correspond due to difference in methodologies.  ************************************************************  This PCR assay was performed by multiplex PCR. This  Assay tests for 66 bacterial and resistance gene targets.  Please refer to the [facility] test directory  at [email or web address] for details.  --  Blood Culture PCR  Enterococcus faecalis      .Urine Clean Catch (Midstream)  02-19 @ 02:34   50,000 - 99,000 CFU/mL Klebsiella pneumoniae  --  Klebsiella pneumoniae        RADIOLOGY:  no new studies

## 2021-02-25 NOTE — PROGRESS NOTE ADULT - SUBJECTIVE AND OBJECTIVE BOX
Patient is a 75y old  Male who presents with a chief complaint of Unresponsive (24 Feb 2021 16:50)      INTERVAL HPI/OVERNIGHT EVENTS:  ICU Vital Signs Last 24 Hrs  T(C): 37.8 (25 Feb 2021 04:00), Max: 37.8 (25 Feb 2021 04:00)  T(F): 100.1 (25 Feb 2021 04:00), Max: 100.1 (25 Feb 2021 04:00)  HR: 68 (25 Feb 2021 06:00) (61 - 109)  BP: 124/74 (25 Feb 2021 06:00) (93/61 - 144/89)  BP(mean): 88 (25 Feb 2021 06:00) (70 - 105)  ABP: --  ABP(mean): --  RR: 18 (25 Feb 2021 06:00) (18 - 34)  SpO2: 95% (25 Feb 2021 06:00) (93% - 97%)    I&O's Summary    24 Feb 2021 07:01  -  25 Feb 2021 07:00  --------------------------------------------------------  IN: 941.2 mL / OUT: 1145 mL / NET: -203.8 mL      Mode: AC/ CMV (Assist Control/ Continuous Mandatory Ventilation)  RR (machine): 18  TV (machine): 450  FiO2: 40  PEEP: 5  ITime: 1  MAP: 13  PIP: 39      LABS:                        12.5   6.74  )-----------( 172      ( 25 Feb 2021 03:42 )             38.1     02-25    144  |  113<H>  |  41<H>  ----------------------------<  110<H>  4.4   |  30  |  1.14    Ca    8.8      25 Feb 2021 03:42  Phos  3.3     02-25  Mg     2.4     02-25    TPro  6.5  /  Alb  2.1<L>  /  TBili  0.6  /  DBili  x   /  AST  100<H>  /  ALT  164<H>  /  AlkPhos  213<H>  02-25    PT/INR - ( 25 Feb 2021 03:42 )   PT: 15.2 sec;   INR: 1.29 ratio             CAPILLARY BLOOD GLUCOSE      POCT Blood Glucose.: 131 mg/dL (25 Feb 2021 05:14)  POCT Blood Glucose.: 133 mg/dL (24 Feb 2021 23:49)  POCT Blood Glucose.: 114 mg/dL (24 Feb 2021 16:10)  POCT Blood Glucose.: 124 mg/dL (24 Feb 2021 10:50)    ABG - ( 25 Feb 2021 04:04 )  pH, Arterial: 7.51  pH, Blood: x     /  pCO2: 32    /  pO2: 74    / HCO3: 25    / Base Excess: 3.1   /  SaO2: 95                  RADIOLOGY & ADDITIONAL TESTS:    Consultant(s) Notes Reviewed:  [x ] YES  [ ] NO    MEDICATIONS  (STANDING):  aspirin  chewable 81 milliGRAM(s) Oral daily  atorvastatin 80 milliGRAM(s) Oral at bedtime  chlorhexidine 0.12% Liquid 15 milliLiter(s) Oral Mucosa every 12 hours  chlorhexidine 2% Cloths 1 Application(s) Topical <User Schedule>  dexMEDEtomidine Infusion 0.5 MICROgram(s)/kG/Hr (15.1 mL/Hr) IV Continuous <Continuous>  dextrose 50% Injectable 25 Gram(s) IV Push once  enoxaparin Injectable 120 milliGRAM(s) SubCutaneous two times a day  insulin lispro (ADMELOG) corrective regimen sliding scale   SubCutaneous every 6 hours  lactated ringers. 1000 milliLiter(s) (75 mL/Hr) IV Continuous <Continuous>  meropenem  IVPB 1000 milliGRAM(s) IV Intermittent every 8 hours  midodrine 10 milliGRAM(s) Oral every 8 hours  pantoprazole  Injectable 40 milliGRAM(s) IV Push daily    MEDICATIONS  (PRN):  acetaminophen    Suspension .. 650 milliGRAM(s) Oral every 6 hours PRN Temp greater or equal to 38C (100.4F), Mild Pain (1 - 3)      PHYSICAL EXAM:  GENERAL: well built, well nourished  HEAD:  Atraumatic, Normocephalic  EYES: EOMI, PERRLA, conjunctiva and sclera clear  ENT: No tonsillar erythema, exudates, or enlargement; Moist mucous membranes, Good dentition, No lesions  NECK: Supple, No JVD, Normal thyroid, no enlarged nodes  NERVOUS SYSTEM:  Alert & Oriented X3, Good concentration; Motor Strength 5/5 B/L upper and lower extremities; DTRs 2+ intact and symmetric, sensory intact  CHEST/LUNG: B/L good air entry; No rales, rhonchi, or wheezing  HEART: S1S2 normal, no S3, Regular rate and rhythm; No murmurs, rubs, or gallops  ABDOMEN: Soft, Nontender, Nondistended; Bowel sounds present  EXTREMITIES:  2+ Peripheral Pulses, No clubbing, cyanosis, or edema  LYMPH: No lymphadenopathy noted  SKIN: No rashes or lesions    Care Discussed with Consultants/Other Providers [ x] YES  [ ] NO Patient is a 75y old  Male who presents with a chief complaint of Unresponsive (24 Feb 2021 16:50)      INTERVAL HPI/OVERNIGHT EVENTS:  Patient alert this AM, opens eyes, follows basic commands. Was placed on SBT with pressure support of 12 for a couple of hours. Turned off when patient started getting tachypneic and hypoxic.    ICU Vital Signs Last 24 Hrs  T(C): 37.8 (25 Feb 2021 04:00), Max: 37.8 (25 Feb 2021 04:00)  T(F): 100.1 (25 Feb 2021 04:00), Max: 100.1 (25 Feb 2021 04:00)  HR: 68 (25 Feb 2021 06:00) (61 - 109)  BP: 124/74 (25 Feb 2021 06:00) (93/61 - 144/89)  BP(mean): 88 (25 Feb 2021 06:00) (70 - 105)  ABP: --  ABP(mean): --  RR: 18 (25 Feb 2021 06:00) (18 - 34)  SpO2: 95% (25 Feb 2021 06:00) (93% - 97%)    I&O's Summary    24 Feb 2021 07:01  -  25 Feb 2021 07:00  --------------------------------------------------------  IN: 941.2 mL / OUT: 1145 mL / NET: -203.8 mL      Mode: AC/ CMV (Assist Control/ Continuous Mandatory Ventilation)  RR (machine): 18  TV (machine): 450  FiO2: 40  PEEP: 5  ITime: 1  MAP: 13  PIP: 39      LABS:                        12.5   6.74  )-----------( 172      ( 25 Feb 2021 03:42 )             38.1     02-25    144  |  113<H>  |  41<H>  ----------------------------<  110<H>  4.4   |  30  |  1.14    Ca    8.8      25 Feb 2021 03:42  Phos  3.3     02-25  Mg     2.4     02-25    TPro  6.5  /  Alb  2.1<L>  /  TBili  0.6  /  DBili  x   /  AST  100<H>  /  ALT  164<H>  /  AlkPhos  213<H>  02-25    PT/INR - ( 25 Feb 2021 03:42 )   PT: 15.2 sec;   INR: 1.29 ratio             CAPILLARY BLOOD GLUCOSE      POCT Blood Glucose.: 131 mg/dL (25 Feb 2021 05:14)  POCT Blood Glucose.: 133 mg/dL (24 Feb 2021 23:49)  POCT Blood Glucose.: 114 mg/dL (24 Feb 2021 16:10)  POCT Blood Glucose.: 124 mg/dL (24 Feb 2021 10:50)    ABG - ( 25 Feb 2021 04:04 )  pH, Arterial: 7.51  pH, Blood: x     /  pCO2: 32    /  pO2: 74    / HCO3: 25    / Base Excess: 3.1   /  SaO2: 95                  RADIOLOGY & ADDITIONAL TESTS:    Consultant(s) Notes Reviewed:  [x ] YES  [ ] NO    MEDICATIONS  (STANDING):  aspirin  chewable 81 milliGRAM(s) Oral daily  atorvastatin 80 milliGRAM(s) Oral at bedtime  chlorhexidine 0.12% Liquid 15 milliLiter(s) Oral Mucosa every 12 hours  chlorhexidine 2% Cloths 1 Application(s) Topical <User Schedule>  dexMEDEtomidine Infusion 0.5 MICROgram(s)/kG/Hr (15.1 mL/Hr) IV Continuous <Continuous>  dextrose 50% Injectable 25 Gram(s) IV Push once  enoxaparin Injectable 120 milliGRAM(s) SubCutaneous two times a day  insulin lispro (ADMELOG) corrective regimen sliding scale   SubCutaneous every 6 hours  lactated ringers. 1000 milliLiter(s) (75 mL/Hr) IV Continuous <Continuous>  meropenem  IVPB 1000 milliGRAM(s) IV Intermittent every 8 hours  midodrine 10 milliGRAM(s) Oral every 8 hours  pantoprazole  Injectable 40 milliGRAM(s) IV Push daily    MEDICATIONS  (PRN):  acetaminophen    Suspension .. 650 milliGRAM(s) Oral every 6 hours PRN Temp greater or equal to 38C (100.4F), Mild Pain (1 - 3)      PHYSICAL EXAM:    GENERAL:obese , +ETT , awake, opens eyes with verbal stimulation  HEAD:  Atraumatic, Normocephalic  EYES: EOMI, PERRLA, conjunctiva and sclera clear  NERVOUS SYSTEM:  Alert, eyes opens and tracks. Minimally moves extremities when instructed to.   CHEST/LUNG: B/L good air entry; No rales, rhonchi, or wheezing  HEART: S1S2 normal, no S3, Regular rate and rhythm; No murmurs, rubs, or gallops  ABDOMEN: Soft, Nontender, Nondistended; Bowel sounds present  EXTREMITIES:  2+ Peripheral Pulses, bilateral LE oedema and scrotal oedema  LYMPH: No lymphadenopathy noted  SKIN: No rashes or lesions    Care Discussed with Consultants/Other Providers [ x] YES  [ ] NO

## 2021-02-26 LAB
ALBUMIN SERPL ELPH-MCNC: 2 G/DL — LOW (ref 3.5–5)
ALP SERPL-CCNC: 186 U/L — HIGH (ref 40–120)
ALT FLD-CCNC: 124 U/L DA — HIGH (ref 10–60)
ANION GAP SERPL CALC-SCNC: 5 MMOL/L — SIGNIFICANT CHANGE UP (ref 5–17)
AST SERPL-CCNC: 62 U/L — HIGH (ref 10–40)
BASE EXCESS BLDA CALC-SCNC: 3.2 MMOL/L — HIGH (ref -2–2)
BILIRUB SERPL-MCNC: 0.5 MG/DL — SIGNIFICANT CHANGE UP (ref 0.2–1.2)
BLOOD GAS COMMENTS ARTERIAL: SIGNIFICANT CHANGE UP
BUN SERPL-MCNC: 41 MG/DL — HIGH (ref 7–18)
CALCIUM SERPL-MCNC: 8.7 MG/DL — SIGNIFICANT CHANGE UP (ref 8.4–10.5)
CHLORIDE SERPL-SCNC: 111 MMOL/L — HIGH (ref 96–108)
CO2 SERPL-SCNC: 29 MMOL/L — SIGNIFICANT CHANGE UP (ref 22–31)
CREAT SERPL-MCNC: 1.11 MG/DL — SIGNIFICANT CHANGE UP (ref 0.5–1.3)
CULTURE RESULTS: SIGNIFICANT CHANGE UP
CULTURE RESULTS: SIGNIFICANT CHANGE UP
GLUCOSE BLDC GLUCOMTR-MCNC: 102 MG/DL — HIGH (ref 70–99)
GLUCOSE BLDC GLUCOMTR-MCNC: 106 MG/DL — HIGH (ref 70–99)
GLUCOSE BLDC GLUCOMTR-MCNC: 116 MG/DL — HIGH (ref 70–99)
GLUCOSE BLDC GLUCOMTR-MCNC: 148 MG/DL — HIGH (ref 70–99)
GLUCOSE BLDC GLUCOMTR-MCNC: 82 MG/DL — SIGNIFICANT CHANGE UP (ref 70–99)
GLUCOSE BLDC GLUCOMTR-MCNC: 90 MG/DL — SIGNIFICANT CHANGE UP (ref 70–99)
GLUCOSE BLDC GLUCOMTR-MCNC: 93 MG/DL — SIGNIFICANT CHANGE UP (ref 70–99)
GLUCOSE SERPL-MCNC: 137 MG/DL — HIGH (ref 70–99)
HCO3 BLDA-SCNC: 26 MMOL/L — SIGNIFICANT CHANGE UP (ref 23–27)
HCT VFR BLD CALC: 37.9 % — LOW (ref 39–50)
HGB BLD-MCNC: 12.2 G/DL — LOW (ref 13–17)
HOROWITZ INDEX BLDA+IHG-RTO: 40 — SIGNIFICANT CHANGE UP
MAGNESIUM SERPL-MCNC: 2.4 MG/DL — SIGNIFICANT CHANGE UP (ref 1.6–2.6)
MCHC RBC-ENTMCNC: 31.7 PG — SIGNIFICANT CHANGE UP (ref 27–34)
MCHC RBC-ENTMCNC: 32.2 GM/DL — SIGNIFICANT CHANGE UP (ref 32–36)
MCV RBC AUTO: 98.4 FL — SIGNIFICANT CHANGE UP (ref 80–100)
NRBC # BLD: 0 /100 WBCS — SIGNIFICANT CHANGE UP (ref 0–0)
PCO2 BLDA: 37 MMHG — SIGNIFICANT CHANGE UP (ref 32–46)
PH BLDA: 7.46 — HIGH (ref 7.35–7.45)
PHOSPHATE SERPL-MCNC: 2.9 MG/DL — SIGNIFICANT CHANGE UP (ref 2.5–4.5)
PLATELET # BLD AUTO: 169 K/UL — SIGNIFICANT CHANGE UP (ref 150–400)
PO2 BLDA: 81 MMHG — SIGNIFICANT CHANGE UP (ref 74–108)
POTASSIUM SERPL-MCNC: 3.9 MMOL/L — SIGNIFICANT CHANGE UP (ref 3.5–5.3)
POTASSIUM SERPL-SCNC: 3.9 MMOL/L — SIGNIFICANT CHANGE UP (ref 3.5–5.3)
PROT SERPL-MCNC: 6.5 G/DL — SIGNIFICANT CHANGE UP (ref 6–8.3)
RBC # BLD: 3.85 M/UL — LOW (ref 4.2–5.8)
RBC # FLD: 12.2 % — SIGNIFICANT CHANGE UP (ref 10.3–14.5)
SAO2 % BLDA: 96 % — SIGNIFICANT CHANGE UP (ref 92–96)
SODIUM SERPL-SCNC: 145 MMOL/L — SIGNIFICANT CHANGE UP (ref 135–145)
SPECIMEN SOURCE: SIGNIFICANT CHANGE UP
SPECIMEN SOURCE: SIGNIFICANT CHANGE UP
WBC # BLD: 6.22 K/UL — SIGNIFICANT CHANGE UP (ref 3.8–10.5)
WBC # FLD AUTO: 6.22 K/UL — SIGNIFICANT CHANGE UP (ref 3.8–10.5)

## 2021-02-26 PROCEDURE — 71045 X-RAY EXAM CHEST 1 VIEW: CPT | Mod: 26

## 2021-02-26 RX ORDER — DEXTROSE 50 % IN WATER 50 %
15 SYRINGE (ML) INTRAVENOUS ONCE
Refills: 0 | Status: DISCONTINUED | OUTPATIENT
Start: 2021-02-26 | End: 2021-02-26

## 2021-02-26 RX ORDER — INSULIN LISPRO 100/ML
VIAL (ML) SUBCUTANEOUS
Refills: 0 | Status: DISCONTINUED | OUTPATIENT
Start: 2021-02-26 | End: 2021-03-08

## 2021-02-26 RX ORDER — SODIUM CHLORIDE 9 MG/ML
1000 INJECTION, SOLUTION INTRAVENOUS
Refills: 0 | Status: DISCONTINUED | OUTPATIENT
Start: 2021-02-26 | End: 2021-02-26

## 2021-02-26 RX ORDER — GLUCAGON INJECTION, SOLUTION 0.5 MG/.1ML
1 INJECTION, SOLUTION SUBCUTANEOUS ONCE
Refills: 0 | Status: DISCONTINUED | OUTPATIENT
Start: 2021-02-26 | End: 2021-02-26

## 2021-02-26 RX ORDER — MIDODRINE HYDROCHLORIDE 2.5 MG/1
5 TABLET ORAL EVERY 8 HOURS
Refills: 0 | Status: DISCONTINUED | OUTPATIENT
Start: 2021-02-26 | End: 2021-03-03

## 2021-02-26 RX ADMIN — ENOXAPARIN SODIUM 120 MILLIGRAM(S): 100 INJECTION SUBCUTANEOUS at 17:01

## 2021-02-26 RX ADMIN — DEXMEDETOMIDINE HYDROCHLORIDE IN 0.9% SODIUM CHLORIDE 15.1 MICROGRAM(S)/KG/HR: 4 INJECTION INTRAVENOUS at 00:39

## 2021-02-26 RX ADMIN — ENOXAPARIN SODIUM 120 MILLIGRAM(S): 100 INJECTION SUBCUTANEOUS at 05:38

## 2021-02-26 RX ADMIN — Medication 81 MILLIGRAM(S): at 11:58

## 2021-02-26 RX ADMIN — CHLORHEXIDINE GLUCONATE 1 APPLICATION(S): 213 SOLUTION TOPICAL at 05:40

## 2021-02-26 RX ADMIN — CEFTRIAXONE 100 MILLIGRAM(S): 500 INJECTION, POWDER, FOR SOLUTION INTRAMUSCULAR; INTRAVENOUS at 21:27

## 2021-02-26 RX ADMIN — Medication 100 MILLIGRAM(S): at 21:35

## 2021-02-26 RX ADMIN — MIDODRINE HYDROCHLORIDE 10 MILLIGRAM(S): 2.5 TABLET ORAL at 05:39

## 2021-02-26 RX ADMIN — MIDODRINE HYDROCHLORIDE 5 MILLIGRAM(S): 2.5 TABLET ORAL at 13:11

## 2021-02-26 RX ADMIN — MIDODRINE HYDROCHLORIDE 5 MILLIGRAM(S): 2.5 TABLET ORAL at 21:27

## 2021-02-26 RX ADMIN — CHLORHEXIDINE GLUCONATE 15 MILLILITER(S): 213 SOLUTION TOPICAL at 05:40

## 2021-02-26 RX ADMIN — ATORVASTATIN CALCIUM 80 MILLIGRAM(S): 80 TABLET, FILM COATED ORAL at 21:30

## 2021-02-26 NOTE — PROGRESS NOTE ADULT - ATTENDING COMMENTS
IMP: This is a 75 yr old man from [facility], non ambulatory with  CVA( twice first in July 2020, second 3 weeks ago), HTN, HLD, Prediabetes,BPH was brought in from NH for unresponsiveness.   ED course: Pt was unresponsive so was intubated in The ED and Femoral line was placed by the ED physician       Assessment:  1. Acute Hypoxic Respiratory Failure     2. Acute Encephalopathy   3. Septic shock   4. CVA   5. pre-diabetes   6. HTN   7. BPH     Plan;  -stated on SBT, and extubated 2/26  -off pressors   -decrease sedation   -continue vent support  -adjust vent as per ABG  -sedated and pain control   -hemodynamic support  -monitor blood sugar   -pressors to maintain MAP>65  -monitor biomarkers daily  -change to lovenox therapeutic . IMP: This is a 75 [facility], non ambulatory with  CVA( twice first in July 2020, second 3 weeks ago), HTN, HLD, Prediabetes,BPH was brought in from NH for unresponsiveness.   ED course: Pt was unresponsive so was intubated in The ED and Femoral line was placed by the ED physician       Assessment:  1. Acute Hypoxic Respiratory Failure     2. Acute Encephalopathy   3. Septic shock   4. CVA   5. pre-diabetes   6. HTN   7. BPH     Plan;  -stated on SBT, and extubated 2/26  -BiPaP at night , pat has body suggestive of ELIUD  -off pressors   -decrease sedation   -continue vent support  -adjust vent as per ABG  -sedated and pain control   -hemodynamic support  -monitor blood sugar   -pressors to maintain MAP>65  -monitor biomarkers daily  -change to lovenox therapeutic .

## 2021-02-26 NOTE — PROGRESS NOTE ADULT - ASSESSMENT
75 yr old male fom NH, H/O CVA recent/dementia/HTN/BPH, admit hospital for unresponsive, intubated at ER, CT chest/ABD showed PNA/aspiration(?)/blood culture positive, start pressor for septic shock//IV ABS, F/U pan culture result, ICU care, close monitor lab, titrate pressor, elevated LFT, shock liver(?), close monitor LFT  stable,  continue ICU care, vent support, S/P extubation, swallow evaluation, continue midodrine, ABS, F/U lab

## 2021-02-26 NOTE — PROGRESS NOTE ADULT - SUBJECTIVE AND OBJECTIVE BOX
Patient is a 75y old  Male who presents with a chief complaint of Unresponsive (26 Feb 2021 14:18)      INTERVAL HPI/OVERNIGHT EVENTS:    Extubated this AM after successful SBT  ICU Vital Signs Last 24 Hrs  T(C): 37.1 (26 Feb 2021 13:00), Max: 37.3 (26 Feb 2021 00:00)  T(F): 98.7 (26 Feb 2021 13:00), Max: 99.1 (26 Feb 2021 00:00)  HR: 108 (26 Feb 2021 15:00) (54 - 113)  BP: 114/83 (26 Feb 2021 15:00) (94/65 - 134/80)  BP(mean): 92 (26 Feb 2021 15:00) (74 - 97)  ABP: --  ABP(mean): --  RR: 34 (26 Feb 2021 15:00) (14 - 34)  SpO2: 94% (26 Feb 2021 15:00) (92% - 98%)    I&O's Summary    25 Feb 2021 07:01  -  26 Feb 2021 07:00  --------------------------------------------------------  IN: 803 mL / OUT: 1015 mL / NET: -212 mL    26 Feb 2021 07:01  -  26 Feb 2021 16:02  --------------------------------------------------------  IN: 0 mL / OUT: 330 mL / NET: -330 mL      Mode: CPAP with PS  FiO2: 40  PEEP: 5  PS: 7  ITime: 1  MAP: 9  PIP: 17      LABS:                        12.2   6.22  )-----------( 169      ( 26 Feb 2021 05:10 )             37.9     02-26    145  |  111<H>  |  41<H>  ----------------------------<  137<H>  3.9   |  29  |  1.11    Ca    8.7      26 Feb 2021 05:10  Phos  2.9     02-26  Mg     2.4     02-26    TPro  6.5  /  Alb  2.0<L>  /  TBili  0.5  /  DBili  x   /  AST  62<H>  /  ALT  124<H>  /  AlkPhos  186<H>  02-26    PT/INR - ( 25 Feb 2021 03:42 )   PT: 15.2 sec;   INR: 1.29 ratio             CAPILLARY BLOOD GLUCOSE      POCT Blood Glucose.: 102 mg/dL (26 Feb 2021 11:15)  POCT Blood Glucose.: 148 mg/dL (26 Feb 2021 05:22)  POCT Blood Glucose.: 115 mg/dL (25 Feb 2021 23:33)  POCT Blood Glucose.: 122 mg/dL (25 Feb 2021 17:08)    ABG - ( 26 Feb 2021 03:42 )  pH, Arterial: 7.46  pH, Blood: x     /  pCO2: 37    /  pO2: 81    / HCO3: 26    / Base Excess: 3.2   /  SaO2: 96                  RADIOLOGY & ADDITIONAL TESTS:    Consultant(s) Notes Reviewed:  [x ] YES  [ ] NO    MEDICATIONS  (STANDING):  aspirin  chewable 81 milliGRAM(s) Oral daily  atorvastatin 80 milliGRAM(s) Oral at bedtime  cefTRIAXone   IVPB      cefTRIAXone   IVPB 1000 milliGRAM(s) IV Intermittent every 24 hours  chlorhexidine 2% Cloths 1 Application(s) Topical <User Schedule>  dextrose 50% Injectable 25 Gram(s) IV Push once  enoxaparin Injectable 120 milliGRAM(s) SubCutaneous two times a day  insulin lispro (ADMELOG) corrective regimen sliding scale   SubCutaneous every 6 hours  midodrine 5 milliGRAM(s) Oral every 8 hours    MEDICATIONS  (PRN):  acetaminophen    Suspension .. 650 milliGRAM(s) Oral every 6 hours PRN Temp greater or equal to 38C (100.4F), Mild Pain (1 - 3)      PHYSICAL EXAM: Examed in AM prior to extubation  GENERAL:obese , +ETT , awake, opens eyes with verbal stimulation  HEAD:  Atraumatic, Normocephalic  EYES: EOMI, PERRLA, conjunctiva and sclera clear  NERVOUS SYSTEM:  Alert, eyes opens and tracks. Minimally moves extremities when instructed to.   CHEST/LUNG: B/L good air entry; No rales, rhonchi, or wheezing  HEART: S1S2 normal, no S3, Regular rate and rhythm; No murmurs, rubs, or gallops  ABDOMEN: Soft, Nontender, Nondistended; Bowel sounds present  EXTREMITIES:  2+ Peripheral Pulses, bilateral LE oedema and scrotal oedema  LYMPH: No lymphadenopathy noted  SKIN: No rashes or lesions    Care Discussed with Consultants/Other Providers [ x] YES  [ ] NO

## 2021-02-26 NOTE — SWALLOW BEDSIDE ASSESSMENT ADULT - H & P REVIEW
The patient presented for follow-up to the wrong facility. She was under the impression her neurologist was here at [place] only to discover she sees Dr. Boyle through [facility]. Bertin Leahy MD  06/11/18 0833    
Consult received, EMR, labs, radiology reviewed./yes

## 2021-02-26 NOTE — SWALLOW BEDSIDE ASSESSMENT ADULT - ORAL PHASE
Decreased anterior-posterior movement of the bolus/Delayed oral transit time base-of-tongue pumping/Decreased anterior-posterior movement of the bolus/Delayed oral transit time

## 2021-02-26 NOTE — SWALLOW BEDSIDE ASSESSMENT ADULT - SWALLOW EVAL: DIAGNOSIS
Pt p/w s&s oropharyngeal dysphagia c/b impaired bolus formation, slow A-P transport w/ base-of-tongue pumping, delayed swallow trigger, & decreased hyolaryngeal elevation; overt s/s aspiration (Cough) directly following thin liquid trials. Suspected premature spillage to the hypopharynx.

## 2021-02-26 NOTE — PROGRESS NOTE ADULT - ASSESSMENT
75 Y M from [facility] rehab, non ambulatory with PMhx of CVA( twice first in July 2020, second 3 weeks ago), HTN, HLD, Prediabetes,BPH was brought in from NH for unresponsiveness.   ED course: Pt was unresponsive so was intubated in The ED and Femoral line was placed by the ED physician   Vitals : Febrile to 103 F , , /108  Labs : wbc 6k ,lactate 1.2, Bun/CR 40/1.56  T1 0.018  ECG : NSR   CXR : clear    Pt will be admitted to ICU for Acute hypoxic Respiratory Failure and Septic shock         Assessment:  1. Acute Hypoxic Respiratory Failure     2. Acute Encephalopathy   3. Septic shock 2/2 UTI  4. CVA   5. pre-diabetes   6. HTN   7. BPH     Plan:    =====================[CNS ]==============================  # Acute Encephalopathy :    - recent stroke 3 weeks ago , alert ,oriented and follows commands as per NH papers at baseline, confused and memory loss after stroke as per wife   -  CTH no acute event ,Age-indeterminate left PCA territory infarction.  -On minimal precedex with some mental status, able to move extremities.     # CVA :   - L post cerebral A stroke as per NH papers   - Pt is on Aspirin, Full Dose lovenox(in place of NOAC) and statin        =====================[CVS ]==============================  # Septic shock :   - likely 2/2 Urosepsis   - UA +ve   - midodrine tapered to 5mg TID  - meropenem switched to ceftriaxone  - Repeat Cultures negative to date  - Initial culture with enterococcus faecalis and Coag Neg staph, Urine culture with Klebsiella      # HTN :   - pt is on toprol, losartan and nifedipine   - Hold BP medications   - Levo dced, on midodrine taper  -  Echo grade 1 diastolic dysfuction,    =====================[RESP ]==============================  # Acute Hypoxic Resp Failure :   - Now [id number]  - Would monitor respiratory status closely and possibly start BiPAP    =====================[ GI ]================================  # No issues :   - NGT in place   speech and swallow ordered  ====================[ RENAL ]=============================   # URSULA over CKD :   - Improved  =====================[ ID ]================================  # Urosepsis :   -  febrile to 103 F, no wbc count or lactate , CXR clear   - UA +ve   urine culture klebsiella  - meropenem switched to ceftriaxone  - Repeat cultures negative to date       ===================[ HEME/ONC ]===========================  # No issues :  - Hb and Plt stable   - monitor cbc daily     =====================[ ENDO ]=============================  # Pre -diabetes : -  - Last A1c 6.2   - target CBG < 180  - FS q6 while NPO, will start on ISS   - blood glucose well controlled    ==================[ SKIN/ CATHETERS ]======================  # no wound or skin break   ==================[ PROPHYLAXIS ]==========================   # Dvt : full dose lovenox  # Gi : Protonix     ====================[ DISPO ]==============================   - Monitor in ICU     ===================[ PROGNOSIS ]===========================  - improving

## 2021-02-26 NOTE — SWALLOW BEDSIDE ASSESSMENT ADULT - SLP PERTINENT HISTORY OF CURRENT PROBLEM
AA+Ox2, HOB elevated to 90°. Exam given in Polish via . NGT (sump) removed prior to exam. Pt p/w s&s oropharyngeal dysphagia c/b impaired bolus formation, slow A-P transport w/ base-of-tongue pumping, delayed swallow trigger, & decreased hyolaryngeal elevation; overt s/s aspiration (Cough) directly following thin liquid trials. Suspected premature spillage to the hypopharynx.

## 2021-02-26 NOTE — SWALLOW BEDSIDE ASSESSMENT ADULT - MODE OF PRESENTATION
spoon x 3; cup x 3/cup/spoon/fed by clinician cup/fed by clinician spoon/fed by clinician cup/self fed

## 2021-02-26 NOTE — SWALLOW BEDSIDE ASSESSMENT ADULT - ORAL PREPARATORY PHASE
impaired bolus formation/Reduced oral grading poor pucker to cup presentation Reduced oral grading poor pucker to cup presentation/Anterior loss of bolus

## 2021-02-26 NOTE — SWALLOW BEDSIDE ASSESSMENT ADULT - PHARYNGEAL PHASE
Delayed pharyngeal swallow/Decreased laryngeal elevation/Multiple swallows Delayed pharyngeal swallow/Decreased laryngeal elevation/Wet vocal quality post oral intake/Cough post oral intake/Throat clear post oral intake/Delayed cough post oral intake/Multiple swallows

## 2021-02-26 NOTE — PROGRESS NOTE ADULT - SUBJECTIVE AND OBJECTIVE BOX
Patient is a 75y old  Male who presents with a chief complaint of Unresponsive (25 Feb 2021 13:35)/AMS/metabolic encephalopathy/septic shock/apiration HCAP/UTI/intubation/extubation/shock liver, continue midodrine, swallow evaluation      INTERVAL HPI/OVERNIGHT EVENTS:  T(C): 36.6 (02-26-21 @ 09:00), Max: 37.3 (02-26-21 @ 00:00)  HR: 90 (02-26-21 @ 10:00) (54 - 90)  BP: 119/76 (02-26-21 @ 10:00) (79/60 - 158/74)  RR: 29 (02-26-21 @ 10:00) (14 - 29)  SpO2: 93% (02-26-21 @ 10:00) (88% - 98%)  Wt(kg): --    LABS:                        12.2   6.22  )-----------( 169      ( 26 Feb 2021 05:10 )             37.9     02-26    145  |  111<H>  |  41<H>  ----------------------------<  137<H>  3.9   |  29  |  1.11    Ca    8.7      26 Feb 2021 05:10  Phos  2.9     02-26  Mg     2.4     02-26    TPro  6.5  /  Alb  2.0<L>  /  TBili  0.5  /  DBili  x   /  AST  62<H>  /  ALT  124<H>  /  AlkPhos  186<H>  02-26    PT/INR - ( 25 Feb 2021 03:42 )   PT: 15.2 sec;   INR: 1.29 ratio             CAPILLARY BLOOD GLUCOSE      POCT Blood Glucose.: 148 mg/dL (26 Feb 2021 05:22)  POCT Blood Glucose.: 115 mg/dL (25 Feb 2021 23:33)  POCT Blood Glucose.: 122 mg/dL (25 Feb 2021 17:08)  POCT Blood Glucose.: 120 mg/dL (25 Feb 2021 11:53)    ABG - ( 26 Feb 2021 03:42 )  pH, Arterial: 7.46  pH, Blood: x     /  pCO2: 37    /  pO2: 81    / HCO3: 26    / Base Excess: 3.2   /  SaO2: 96                  RADIOLOGY & ADDITIONAL TESTS:    Consultant(s) Notes Reviewed:  [x ] YES  [ ] NO    PHYSICAL EXAM:  GENERAL: well built, well nourished  HEAD:  Atraumatic, Normocephalic  EYES: EOMI, PERRLA, conjunctiva and sclera clear  ENT: No tonsillar erythema, exudates, or enlargement; Moist mucous membranes, Good dentition, No lesions, NG tube in place  NECK: Supple, No JVD, Normal thyroid, no enlarged nodes  NERVOUS SYSTEM:  Awake   CHEST/LUNG: B/L good air entry; No rales, rhonchi, or wheezing  HEART: S1S2 normal, no S3, Regular rate and rhythm; No murmurs, rubs, or gallops  ABDOMEN: Soft, Nontender, Nondistended; Bowel sounds present  EXTREMITIES:  2+ Peripheral Pulses, No clubbing, cyanosis, positive edema  LYMPH: No lymphadenopathy noted  SKIN: No rashes or lesions    Care Discussed with Consultants/Other Providers [ x] YES  [ ] NO

## 2021-02-27 LAB
ALBUMIN SERPL ELPH-MCNC: 2.3 G/DL — LOW (ref 3.5–5)
ALP SERPL-CCNC: 165 U/L — HIGH (ref 40–120)
ALT FLD-CCNC: 99 U/L DA — HIGH (ref 10–60)
ANION GAP SERPL CALC-SCNC: 5 MMOL/L — SIGNIFICANT CHANGE UP (ref 5–17)
AST SERPL-CCNC: 50 U/L — HIGH (ref 10–40)
BILIRUB SERPL-MCNC: 0.6 MG/DL — SIGNIFICANT CHANGE UP (ref 0.2–1.2)
BUN SERPL-MCNC: 38 MG/DL — HIGH (ref 7–18)
CALCIUM SERPL-MCNC: 8.9 MG/DL — SIGNIFICANT CHANGE UP (ref 8.4–10.5)
CHLORIDE SERPL-SCNC: 111 MMOL/L — HIGH (ref 96–108)
CO2 SERPL-SCNC: 29 MMOL/L — SIGNIFICANT CHANGE UP (ref 22–31)
CREAT SERPL-MCNC: 1.02 MG/DL — SIGNIFICANT CHANGE UP (ref 0.5–1.3)
GLUCOSE BLDC GLUCOMTR-MCNC: 111 MG/DL — HIGH (ref 70–99)
GLUCOSE BLDC GLUCOMTR-MCNC: 89 MG/DL — SIGNIFICANT CHANGE UP (ref 70–99)
GLUCOSE BLDC GLUCOMTR-MCNC: 90 MG/DL — SIGNIFICANT CHANGE UP (ref 70–99)
GLUCOSE BLDC GLUCOMTR-MCNC: 95 MG/DL — SIGNIFICANT CHANGE UP (ref 70–99)
GLUCOSE SERPL-MCNC: 94 MG/DL — SIGNIFICANT CHANGE UP (ref 70–99)
HCT VFR BLD CALC: 38.4 % — LOW (ref 39–50)
HGB BLD-MCNC: 12.3 G/DL — LOW (ref 13–17)
MAGNESIUM SERPL-MCNC: 2.5 MG/DL — SIGNIFICANT CHANGE UP (ref 1.6–2.6)
MCHC RBC-ENTMCNC: 31.1 PG — SIGNIFICANT CHANGE UP (ref 27–34)
MCHC RBC-ENTMCNC: 32 GM/DL — SIGNIFICANT CHANGE UP (ref 32–36)
MCV RBC AUTO: 97.2 FL — SIGNIFICANT CHANGE UP (ref 80–100)
MRSA PCR RESULT.: SIGNIFICANT CHANGE UP
NRBC # BLD: 0 /100 WBCS — SIGNIFICANT CHANGE UP (ref 0–0)
PHOSPHATE SERPL-MCNC: 3.3 MG/DL — SIGNIFICANT CHANGE UP (ref 2.5–4.5)
PLATELET # BLD AUTO: 184 K/UL — SIGNIFICANT CHANGE UP (ref 150–400)
POTASSIUM SERPL-MCNC: 3.7 MMOL/L — SIGNIFICANT CHANGE UP (ref 3.5–5.3)
POTASSIUM SERPL-SCNC: 3.7 MMOL/L — SIGNIFICANT CHANGE UP (ref 3.5–5.3)
PROT SERPL-MCNC: 6.5 G/DL — SIGNIFICANT CHANGE UP (ref 6–8.3)
RBC # BLD: 3.95 M/UL — LOW (ref 4.2–5.8)
RBC # FLD: 11.8 % — SIGNIFICANT CHANGE UP (ref 10.3–14.5)
S AUREUS DNA NOSE QL NAA+PROBE: SIGNIFICANT CHANGE UP
SODIUM SERPL-SCNC: 145 MMOL/L — SIGNIFICANT CHANGE UP (ref 135–145)
WBC # BLD: 6.6 K/UL — SIGNIFICANT CHANGE UP (ref 3.8–10.5)
WBC # FLD AUTO: 6.6 K/UL — SIGNIFICANT CHANGE UP (ref 3.8–10.5)

## 2021-02-27 RX ORDER — FUROSEMIDE 40 MG
20 TABLET ORAL ONCE
Refills: 0 | Status: COMPLETED | OUTPATIENT
Start: 2021-02-27 | End: 2021-02-27

## 2021-02-27 RX ADMIN — ATORVASTATIN CALCIUM 80 MILLIGRAM(S): 80 TABLET, FILM COATED ORAL at 21:51

## 2021-02-27 RX ADMIN — MIDODRINE HYDROCHLORIDE 5 MILLIGRAM(S): 2.5 TABLET ORAL at 05:39

## 2021-02-27 RX ADMIN — ENOXAPARIN SODIUM 120 MILLIGRAM(S): 100 INJECTION SUBCUTANEOUS at 05:39

## 2021-02-27 RX ADMIN — ENOXAPARIN SODIUM 120 MILLIGRAM(S): 100 INJECTION SUBCUTANEOUS at 16:45

## 2021-02-27 RX ADMIN — MIDODRINE HYDROCHLORIDE 5 MILLIGRAM(S): 2.5 TABLET ORAL at 12:10

## 2021-02-27 RX ADMIN — CEFTRIAXONE 100 MILLIGRAM(S): 500 INJECTION, POWDER, FOR SOLUTION INTRAMUSCULAR; INTRAVENOUS at 20:32

## 2021-02-27 RX ADMIN — MIDODRINE HYDROCHLORIDE 5 MILLIGRAM(S): 2.5 TABLET ORAL at 21:51

## 2021-02-27 RX ADMIN — Medication 20 MILLIGRAM(S): at 11:04

## 2021-02-27 RX ADMIN — Medication 81 MILLIGRAM(S): at 11:04

## 2021-02-27 RX ADMIN — CHLORHEXIDINE GLUCONATE 1 APPLICATION(S): 213 SOLUTION TOPICAL at 05:39

## 2021-02-27 NOTE — PROGRESS NOTE ADULT - ATTENDING COMMENTS
Assessment:  1. Acute Hypoxic Respiratory Failure     2. Acute Encephalopathy   3. Septic shock   4. CVA   5. pre-diabetes   6. HTN   7. BPH     Plan;  -Extubated 2/26  -BiPaP at night , pat has body suggestive of ELIUD, on oxygen during waking hours  -Will give Lasix 20 mg IVP to aim for euvolemic volume status  -hemodynamic monitoring, remains off vasopressors  -Taper midodrine  -IV antibiotics per ID   -monitor blood sugar   -DVT and stress ulcer prophylaxis  -Transfer out of ICU today

## 2021-02-27 NOTE — CHART NOTE - NSCHARTNOTEFT_GEN_A_CORE
75 Y M from [facility] rehab, non ambulatory with PMhx of CVA( twice first in July 2020, second 3 weeks ago), HTN, HLD, Prediabetes, BPH was brought in from NH for unresponsiveness. Patient was recently discharged to rehab after he was found to have stroke. Pt was unresponsive so was intubated in The ED and Femoral line was placed by the ED physician. Patient was admitted to ICU for hypoxic respiratory failure and septic shock 2/2 UTI . He was spiking high grade fever, lactate and CXR were normal. Blood and urine cultures were sent in ED followed by Vancomycin and zosyn empiric dosing..   Urinalysis was positive. Patient was start on Pressor support and IV Meropenem  for broad spectrum coverage. Blood cultures were growing Enterococci.. Later antibiotics were de-escalated to Rocephin as urine cultures were growing pan sensitive Klebsiella.  Repeat cultures came back negative  Patient's pressor support progressively decreased , however, he failed some breathing trials.  Patient was extubated on 2/26 after a successful SBT. He is was started on Midodrine 5mg Q8 to wean off the pressors. He had a jackson catheter placed on admission.   Patient is back to his baseline mental status as per the wife, he can not follow all the commands, likely has a language barrier as well.  Based on his current clinical condition, patient is stable to be downgraded to regular medicine floor for continuation of care  he is currently on trial of void    Patient endorsed to   Attending:  Resident/NP:       Things to follow  PT evaluation   Close vital monitoring to wean off midodrine  TOV  ID for recommendations on D/c      Incomplete 75 Y M from [facility] rehab, non ambulatory with PMhx of CVA( twice first in July 2020, second 3 weeks ago), HTN, HLD, Prediabetes, BPH was brought in from NH for unresponsiveness. Patient was recently discharged to rehab after he was found to have stroke. Pt was unresponsive so was intubated in The ED and Femoral line was placed by the ED physician. Patient was admitted to ICU for hypoxic respiratory failure and septic shock 2/2 UTI . He was spiking high grade fever, lactate and CXR were normal. Blood and urine cultures were sent in ED followed by Vancomycin and zosyn empiric dosing..   Urinalysis was positive. Patient was start on Pressor support and IV Meropenem  for broad spectrum coverage. Blood cultures were growing Enterococci.. Later antibiotics were de-escalated to Rocephin as urine cultures were growing pan sensitive Klebsiella.  Repeat cultures came back negative  Patient's pressor support progressively decreased , however, he failed some breathing trials.  Patient was extubated on 2/26 after a successful SBT. He is was started on Midodrine 5mg Q8 to wean off the pressors. He had a jackson catheter placed on admission.   Patient is back to his baseline mental status as per the wife, he can not follow all the commands, likely has a language barrier as well.  Based on his current clinical condition, patient is stable to be downgraded to regular medicine floor for continuation of care  he is currently on trial of void  Patient has questionable history of ELIUD. he was placed on BIPAP on 2/26 for hypoxia and as a precaution sicne he was recently extubated.    Patient endorsed to   Attending:  Resident/NP:       Things to follow  PT evaluation   Close vital monitoring to wean off midodrine  TOV  ID for recommendations on D/c      Incomplete 75 Y M from [facility] rehab, non ambulatory with PMhx of CVA( twice first in July 2020, second 3 weeks ago), HTN, HLD, Prediabetes, BPH was brought in from NH for unresponsiveness. Patient was recently discharged to rehab after he was found to have stroke. Pt was unresponsive so was intubated in The ED and Femoral line was placed by the ED physician. Patient was admitted to ICU for hypoxic respiratory failure and septic shock 2/2 UTI . He was spiking high grade fever, lactate and CXR were normal. Blood and urine cultures were sent in ED followed by Vancomycin and zosyn empiric dosing..   Urinalysis was positive. Patient was start on Pressor support and IV Meropenem  for broad spectrum coverage. Blood cultures were growing Enterococci.. Later antibiotics were de-escalated to Rocephin as urine cultures were growing pan sensitive Klebsiella.  Repeat cultures came back negative  Patient's pressor support progressively decreased, after a few trials of SBT, Patient was extubated on 2/26 after a successful SBT. He is was started on Midodrine 5mg Q8 to wean off the pressors. He had a jackson catheter placed on admission.   Patient is back to his baseline mental status as per the wife, AAOx0-1 due to multiple CVAs and dementia, also as per outpatient PCP. he can not follow all the commands, likely has a language barrier as well.  Based on his current clinical condition, patient is stable to be downgraded to regular medicine floor for continuation of care  he is currently on trial of void  Due to concerns for worsening increasing tachypnea after intubation, patient was placed on nocturnal BIPAP on 2/26 for hypoxia and as a precaution since he was recently extubated.    Patient endorsed to   Attending:  Resident/NP:       Things to follow  Close vital monitoring to wean off midodrine  TOV -12AM 2/28  Respiratory status at night    1) Septic shock due to UTI  - Urine culture with Klebsiella, pan sensitive, Meropenem initially was changed to Ceftriaxone.  - Blood cultures with enterococcus and coag negative staph- likely contaminant as per ID, Dr. Sosa  - Repeat blood cultures negative to date.  - taper off midodrine as tolerated, no longer has vasopressor requirements    2) Chronic Respiratory Failure  - Concerns for ELIUD given weight, was started on BiPAP at night- continue to monitor  - Extubated 2/26 after being intubated 2/18  - Continue BiPAP nocturnally as needed  - Pulm cons    3) Dementia  - AAOx0-1 as per PCP, continue supportive care    4) CVA- Continue home medications as tolerated    5) HTN  - Hold anti hypertensives as patient on midodrine taper    5) BPH  - Continue home meds as tolerated 75 Y M from [facility] rehab, non ambulatory with PMhx of CVA( twice first in July 2020, second 3 weeks ago), HTN, HLD, Prediabetes, BPH was brought in from NH for unresponsiveness. Patient was recently discharged to rehab after he was found to have stroke. Pt was unresponsive so was intubated in The ED and Femoral line was placed by the ED physician. Patient was admitted to ICU for hypoxic respiratory failure and septic shock 2/2 UTI . He was spiking high grade fever, lactate and CXR were normal. Blood and urine cultures were sent in ED followed by Vancomycin and zosyn empiric dosing..   Urinalysis was positive. Patient was start on Pressor support and IV Meropenem  for broad spectrum coverage. Blood cultures were growing Enterococci.. Later antibiotics were de-escalated to Rocephin as urine cultures were growing pan sensitive Klebsiella.  Repeat cultures came back negative  Patient's pressor support progressively decreased, after a few trials of SBT, Patient was extubated on 2/26 after a successful SBT. He is was started on Midodrine 5mg Q8 to wean off the pressors. He had a jackson catheter placed on admission.   Patient is back to his baseline mental status as per the wife, AAOx0-1 due to multiple CVAs and dementia, also as per outpatient PCP. he can not follow all the commands, likely has a language barrier as well.  Based on his current clinical condition, patient is stable to be downgraded to regular medicine floor for continuation of care  he is currently on trial of void  Due to concerns for worsening increasing tachypnea after intubation, patient was placed on nocturnal BIPAP on 2/26 for hypoxia and as a precaution since he was recently extubated.    Patient endorsed to   Attending:  Resident/NP:       Things to follow  Close vital monitoring to wean off midodrine  TOV -12AM 2/28  Respiratory status at night    1) Septic shock due to UTI  - Urine culture with Klebsiella, pan sensitive, Meropenem initially was changed to Ceftriaxone.  - Blood cultures with enterococcus and coag negative staph- likely contaminant as per ID, Dr. Sosa  - Repeat blood cultures negative to date.  - taper off midodrine as tolerated, no longer has vasopressor requirements    2) Chronic Respiratory Failure  - Concerns for ELIUD given weight, was started on BiPAP at night- continue to monitor  - Extubated 2/26 after being intubated 2/18  - Continue BiPAP nocturnally as needed  - Pulm cons    3) Dementia  - AAOx0-1 as per PCP, continue supportive care    4) CVA  -Continue home medications as tolerated  - patient was on xarelto at home which was switched to full dose lovenox during ICU stay  - Will continue with Lovenox until patient's ability to tolerate po established    5) HTN  - Hold anti hypertensives as patient on midodrine taper    5) BPH  - Continue home meds as tolerated 75 Y M from [facility] rehab, non ambulatory with PMhx of CVA( twice first in July 2020, second 3 weeks ago), HTN, HLD, Prediabetes, BPH was brought in from NH for unresponsiveness. Patient was recently discharged to rehab after he was found to have stroke. Pt was unresponsive so was intubated in The ED and Femoral line was placed by the ED physician. Patient was admitted to ICU for hypoxic respiratory failure and septic shock 2/2 UTI . He was spiking high grade fever, lactate and CXR were normal. Blood and urine cultures were sent in ED followed by Vancomycin and zosyn empiric dosing..   Urinalysis was positive. Patient was start on Pressor support and IV Meropenem  for broad spectrum coverage. Blood cultures were growing Enterococci.. Later antibiotics were de-escalated to Rocephin as urine cultures were growing pan sensitive Klebsiella.  Repeat cultures came back negative  Patient's pressor support progressively decreased, after a few trials of SBT, Patient was extubated on 2/26 after a successful SBT. He is was started on Midodrine 5mg Q8 to wean off the pressors. He had a jackson catheter placed on admission.   Patient is back to his baseline mental status as per the wife, AAOx0-1 due to multiple CVAs and dementia, also as per outpatient PCP. he can not follow all the commands, likely has a language barrier as well.  Based on his current clinical condition, patient is stable to be downgraded to regular medicine floor for continuation of care  he is currently on trial of void  Due to concerns for worsening increasing tachypnea after intubation, patient was placed on nocturnal BIPAP on 2/26 for hypoxia and as a precaution since he was recently extubated.    Patient endorsed to   Attending: Dr Daniels  NP: Kimberly Bunch to follow  Close vital monitoring to wean off midodrine  TOV -12AM 2/28  Respiratory status at night    1) Septic shock due to UTI  - Urine culture with Klebsiella, pan sensitive, Meropenem initially was changed to Ceftriaxone.  - Blood cultures with enterococcus and coag negative staph- likely contaminant as per ID, Dr. Sosa  - Repeat blood cultures negative to date.  - taper off midodrine as tolerated, no longer has vasopressor requirements    2) Chronic Respiratory Failure  - Concerns for ELIUD given weight, was started on BiPAP at night- continue to monitor  - Extubated 2/26 after being intubated 2/18  - Continue BiPAP nocturnally as needed  - Pulm cons    3) Dementia  - AAOx0-1 as per PCP, continue supportive care    4) CVA  -Continue home medications as tolerated  - patient was on xarelto at home which was switched to full dose lovenox during ICU stay  - Will continue with Lovenox until patient's ability to tolerate po established    5) HTN  - Hold anti hypertensives as patient on midodrine taper    5) BPH  - Continue home meds as tolerated 75 Y M from [facility] rehab, non ambulatory with PMhx of CVA( twice first in July 2020, second 3 weeks ago), HTN, HLD, Prediabetes, BPH was brought in from NH for unresponsiveness. Patient was recently discharged to rehab after he was found to have stroke. Pt was unresponsive so was intubated in The ED and Femoral line was placed by the ED physician. Patient was admitted to ICU for hypoxic respiratory failure and septic shock 2/2 UTI . He was spiking high grade fever, lactate and CXR were normal. Blood and urine cultures were sent in ED followed by Vancomycin and zosyn empiric dosing..   Urinalysis was positive. Patient was start on Pressor support and IV Meropenem  for broad spectrum coverage. Blood cultures were growing Enterococci.. Later antibiotics were de-escalated to Rocephin as urine cultures were growing pan sensitive Klebsiella.  Repeat cultures came back negative  Patient's pressor support progressively decreased, after a few trials of SBT, Patient was extubated on 2/26 after a successful SBT. He is was started on Midodrine 5mg Q8 to wean off the pressors. He had a jackson catheter placed on admission.   Patient is back to his baseline mental status as per the wife, AAOx0-1 due to multiple CVAs and dementia, also as per outpatient PCP. he can not follow all the commands, likely has a language barrier as well.  Based on his current clinical condition, patient is stable to be downgraded to regular medicine floor for continuation of care  he is currently on trial of void  Due to concerns for worsening increasing tachypnea after intubation, patient was placed on nocturnal BIPAP on 2/26 for hypoxia and as a precaution since he was recently extubated.    Patient endorsed to   Attending: Dr Daniels  NP: Kimberly  Patient's wife was called and informed of transfer    Things to follow  Close vital monitoring to wean off midodrine  TOV -12AM 2/28  Respiratory status at night    1) Septic shock due to UTI  - Urine culture with Klebsiella, pan sensitive, Meropenem initially was changed to Ceftriaxone.  - Blood cultures with enterococcus and coag negative staph- likely contaminant as per ID, Dr. Sosa  - Repeat blood cultures negative to date.  - taper off midodrine as tolerated, no longer has vasopressor requirements    2) Chronic Respiratory Failure  - Concerns for ELIUD given weight, was started on BiPAP at night- continue to monitor  - Extubated 2/26 after being intubated 2/18  - Continue BiPAP nocturnally as needed  - Pulm cons    3) Dementia  - AAOx0-1 as per PCP, continue supportive care    4) CVA  -Continue home medications as tolerated  - patient was on xarelto at home which was switched to full dose lovenox during ICU stay  - Will continue with Lovenox until patient's ability to tolerate po established    5) HTN  - Hold anti hypertensives as patient on midodrine taper    5) BPH  - Continue home meds as tolerated 75 Y M from [facility] rehab, non ambulatory with PMhx of CVA( twice first in July 2020, second 3 weeks ago), HTN, HLD, Prediabetes, BPH was brought in from NH for unresponsiveness. Patient was recently discharged to rehab after he was found to have stroke. Pt was unresponsive so was intubated in The ED and Femoral line was placed by the ED physician. Patient was admitted to ICU for hypoxic respiratory failure and septic shock 2/2 UTI . He was spiking high grade fever, lactate and CXR were normal. Blood and urine cultures were sent in ED followed by Vancomycin and zosyn empiric dosing..   Urinalysis was positive. Patient was start on Pressor support and IV Meropenem  for broad spectrum coverage. Blood cultures were growing Enterococci.. Later antibiotics were de-escalated to Rocephin as urine cultures were growing pan sensitive Klebsiella.  Repeat cultures came back negative  Patient's pressor support progressively decreased, after a few trials of SBT, Patient was extubated on 2/26 after a successful SBT. He is was started on Midodrine 5mg Q8 to wean off the pressors. He had a jackson catheter placed on admission.   Patient is back to his baseline mental status as per the wife, AAOx0-1 due to multiple CVAs and dementia, also as per outpatient PCP. he can not follow all the commands, likely has a language barrier as well.  Based on his current clinical condition, patient is stable to be downgraded to regular medicine floor for continuation of care  he is currently on trial of void  Due to concerns for worsening increasing tachypnea after intubation, patient was placed on nocturnal BIPAP on 2/26 for hypoxia and as a precaution since he was recently extubated.    Patient endorsed to   Attending: Dr Daniels  NP: Kimberly  Attempted to call wife, Courtney @ [phone], however there was no response. Voicemail left      Things to follow  Close vital monitoring to wean off midodrine  TOV -12AM 2/28  Respiratory status at night    1) Septic shock due to UTI  - Urine culture with Klebsiella, pan sensitive, Meropenem initially was changed to Ceftriaxone.  - Blood cultures with enterococcus and coag negative staph- likely contaminant as per ID, Dr. Sosa  - Repeat blood cultures negative to date.  - taper off midodrine as tolerated, no longer has vasopressor requirements    2) Chronic Respiratory Failure  - Concerns for ELIUD given weight, was started on BiPAP at night- continue to monitor  - Extubated 2/26 after being intubated 2/18  - Continue BiPAP nocturnally as needed  - Pulm cons    3) Dementia  - AAOx0-1 as per PCP, continue supportive care    4) CVA  -Continue home medications as tolerated  - patient was on xarelto at home which was switched to full dose lovenox during ICU stay  - Will continue with Lovenox until patient's ability to tolerate po established    5) HTN  - Hold anti hypertensives as patient on midodrine taper    5) BPH  - Continue home meds as tolerated

## 2021-02-27 NOTE — PROGRESS NOTE ADULT - ASSESSMENT
75 yr old male fom NH, H/O CVA recent/dementia/HTN/BPH, admit hospital for unresponsive, intubated at ER, CT chest/ABD showed PNA/aspiration(?)/blood culture positive, start pressor for septic shock//IV ABS, F/U pan culture result, ICU care, close monitor lab, titrate pressor, elevated LFT, shock liver, close monitor LFT  stable, S/P extubation, tolerated po diet, PT eval, D/C planning, fall precaution

## 2021-02-27 NOTE — CHART NOTE - NSCHARTNOTEFT_GEN_A_CORE
Writer spoke with patient's wife and updated her about the current clinical condition and possible downgrade .She understood the plan. She wants to be called back preferably with a polish  when the downgrade is confirmed.

## 2021-02-27 NOTE — PROGRESS NOTE ADULT - SUBJECTIVE AND OBJECTIVE BOX
Patient is a 75y old  Male who presents with a chief complaint of Unresponsive (27 Feb 2021 00:51)/metabolic encephalopathy/septic shock/aspiration PNA/intubation/extubation/URSULA/shock liver, patient improved, tolerated po diet, D/C floor, PT eval, D/C planning      INTERVAL HPI/OVERNIGHT EVENTS:  T(C): 37 (02-27-21 @ 12:00), Max: 37 (02-27-21 @ 12:00)  HR: 90 (02-27-21 @ 12:21) (74 - 109)  BP: 137/75 (02-27-21 @ 12:00) (99/68 - 137/75)  RR: 21 (02-27-21 @ 12:00) (18 - 34)  SpO2: 97% (02-27-21 @ 12:21) (93% - 100%)  Wt(kg): --    LABS:                        12.3   6.60  )-----------( 184      ( 27 Feb 2021 05:37 )             38.4     02-27    145  |  111<H>  |  38<H>  ----------------------------<  94  3.7   |  29  |  1.02    Ca    8.9      27 Feb 2021 05:37  Phos  3.3     02-27  Mg     2.5     02-27    TPro  6.5  /  Alb  2.3<L>  /  TBili  0.6  /  DBili  x   /  AST  50<H>  /  ALT  99<H>  /  AlkPhos  165<H>  02-27        CAPILLARY BLOOD GLUCOSE      POCT Blood Glucose.: 95 mg/dL (27 Feb 2021 11:08)  POCT Blood Glucose.: 89 mg/dL (27 Feb 2021 06:00)  POCT Blood Glucose.: 90 mg/dL (26 Feb 2021 22:07)  POCT Blood Glucose.: 106 mg/dL (26 Feb 2021 16:05)    ABG - ( 26 Feb 2021 03:42 )  pH, Arterial: 7.46  pH, Blood: x     /  pCO2: 37    /  pO2: 81    / HCO3: 26    / Base Excess: 3.2   /  SaO2: 96                  RADIOLOGY & ADDITIONAL TESTS:    Consultant(s) Notes Reviewed:  [x ] YES  [ ] NO    PHYSICAL EXAM:  GENERAL: well built, well nourished  HEAD:  Atraumatic, Normocephalic  EYES: EOMI, PERRLA, conjunctiva and sclera clear  ENT: No tonsillar erythema, exudates, or enlargement; Moist mucous membranes, Good dentition, No lesions  NECK: Supple, No JVD, Normal thyroid, no enlarged nodes  NERVOUS SYSTEM:  Alert & Oriented X3, Good concentration; Motor Strength 5/5 B/L upper and lower extremities; DTRs 2+ intact and symmetric, sensory intact  CHEST/LUNG: B/L good air entry; No rales, rhonchi, or wheezing  HEART: S1S2 normal, no S3, Regular rate and rhythm; No murmurs, rubs, or gallops  ABDOMEN: Soft, Nontender, Nondistended; Bowel sounds present  EXTREMITIES:  2+ Peripheral Pulses, No clubbing, cyanosis, positive  edema  LYMPH: No lymphadenopathy noted  SKIN: No rashes or lesions    Care Discussed with Consultants/Other Providers [ x] YES  [ ] NO

## 2021-02-27 NOTE — PROGRESS NOTE ADULT - SUBJECTIVE AND OBJECTIVE BOX
· Subjective and Objective:   Patient is a 75y old  Male who presents with a chief complaint of Unresponsive (26 Feb 2021 14:18)      INTERVAL HPI/OVERNIGHT EVENTS:    Extubated yesterday and he was tachypneic with RR of 30. He was on BIPAP at night and tolerated well.     PRESSORS: [ ] YES [ x] NO  WHICH:        Antimicrobial:  cefTRIAXone   IVPB      cefTRIAXone   IVPB 1000 milliGRAM(s) IV Intermittent every 24 hours    Cardiovascular:  midodrine 5 milliGRAM(s) Oral every 8 hours    Pulmonary:  guaiFENesin   Syrup  (Sugar-Free) 100 milliGRAM(s) Oral every 4 hours PRN    Hematalogic:  aspirin  chewable 81 milliGRAM(s) Oral daily  enoxaparin Injectable 120 milliGRAM(s) SubCutaneous two times a day    Other:  acetaminophen    Suspension .. 650 milliGRAM(s) Oral every 6 hours PRN  atorvastatin 80 milliGRAM(s) Oral at bedtime  chlorhexidine 2% Cloths 1 Application(s) Topical <User Schedule>  dextrose 50% Injectable 25 Gram(s) IV Push once  insulin lispro (ADMELOG) corrective regimen sliding scale   SubCutaneous Before meals and at bedtime    acetaminophen    Suspension .. 650 milliGRAM(s) Oral every 6 hours PRN  aspirin  chewable 81 milliGRAM(s) Oral daily  atorvastatin 80 milliGRAM(s) Oral at bedtime  cefTRIAXone   IVPB      cefTRIAXone   IVPB 1000 milliGRAM(s) IV Intermittent every 24 hours  chlorhexidine 2% Cloths 1 Application(s) Topical <User Schedule>  dextrose 50% Injectable 25 Gram(s) IV Push once  enoxaparin Injectable 120 milliGRAM(s) SubCutaneous two times a day  guaiFENesin   Syrup  (Sugar-Free) 100 milliGRAM(s) Oral every 4 hours PRN  insulin lispro (ADMELOG) corrective regimen sliding scale   SubCutaneous Before meals and at bedtime  midodrine 5 milliGRAM(s) Oral every 8 hours    Drug Dosing Weight  Height (cm): 170.2 (19 Feb 2021 08:00)  Weight (kg): 120.6 (19 Feb 2021 02:00)  BMI (kg/m2): 41.6 (19 Feb 2021 08:00)  BSA (m2): 2.28 (19 Feb 2021 08:00)        ICU Vital Signs Last 24 Hrs  T(C): 36.4 (27 Feb 2021 00:00), Max: 37.1 (26 Feb 2021 13:00)  T(F): 97.6 (27 Feb 2021 00:00), Max: 98.7 (26 Feb 2021 13:00)  HR: 82 (27 Feb 2021 00:10) (54 - 113)  BP: 120/78 (27 Feb 2021 00:00) (94/65 - 134/80)  BP(mean): 91 (27 Feb 2021 00:00) (76 - 97)  ABP: --  ABP(mean): --  RR: 18 (27 Feb 2021 00:00) (18 - 34)  SpO2: 96% (27 Feb 2021 00:10) (93% - 99%)      ABG - ( 26 Feb 2021 03:42 )  pH, Arterial: 7.46  pH, Blood: x     /  pCO2: 37    /  pO2: 81    / HCO3: 26    / Base Excess: 3.2   /  SaO2: 96                    02-25 @ 07:01  -  02-26 @ 07:00  --------------------------------------------------------  IN: 803 mL / OUT: 1015 mL / NET: -212 mL        Mode: CPAP with PS  FiO2: 40  PEEP: 5  PS: 7  ITime: 1  MAP: 9  PIP: 17          PHYSICAL EXAM:   GENERAL: obese , , awake, opens eyes with verbal stimulation  HEAD:  Atraumatic, Normocephalic  EYES: EOMI, PERRLA, conjunctiva and sclera clear  NERVOUS SYSTEM:  Alert, eyes opens and tracks. Minimally moves extremities when instructed to.   CHEST/LUNG: B/L good air entry; No rales, rhonchi, or wheezing and is on BIPAP  HEART: S1S2 normal, no S3, Regular rate and rhythm; No murmurs, rubs, or gallops  ABDOMEN: Soft, Nontender, Nondistended; Bowel sounds present  EXTREMITIES:  2+ Peripheral Pulses, bilateral LE oedema and scrotal oedema  LYMPH: No lymphadenopathy noted  SKIN: No rashes or lesions    Care Discussed with Consultants/Other Providers [ x] YES  [ ] NO      LABS:  CBC Full  -  ( 26 Feb 2021 05:10 )  WBC Count : 6.22 K/uL  RBC Count : 3.85 M/uL  Hemoglobin : 12.2 g/dL  Hematocrit : 37.9 %  Platelet Count - Automated : 169 K/uL  Mean Cell Volume : 98.4 fl  Mean Cell Hemoglobin : 31.7 pg  Mean Cell Hemoglobin Concentration : 32.2 gm/dL  Auto Neutrophil # : x  Auto Lymphocyte # : x  Auto Monocyte # : x  Auto Eosinophil # : x  Auto Basophil # : x  Auto Neutrophil % : x  Auto Lymphocyte % : x  Auto Monocyte % : x  Auto Eosinophil % : x  Auto Basophil % : x    02-26    145  |  111<H>  |  41<H>  ----------------------------<  137<H>  3.9   |  29  |  1.11    Ca    8.7      26 Feb 2021 05:10  Phos  2.9     02-26  Mg     2.4     02-26    TPro  6.5  /  Alb  2.0<L>  /  TBili  0.5  /  DBili  x   /  AST  62<H>  /  ALT  124<H>  /  AlkPhos  186<H>  02-26    PT/INR - ( 25 Feb 2021 03:42 )   PT: 15.2 sec;   INR: 1.29 ratio                 RADIOLOGY & ADDITIONAL STUDIES REVIEWED:  ***    [ ]GOALS OF CARE DISCUSSION WITH PATIENT/FAMILY/PROXY:    CRITICAL CARE TIME SPENT: 35 minutes   · Subjective and Objective:   Patient is a 75y old  Male who presents with a chief complaint of Unresponsive (26 Feb 2021 14:18)      INTERVAL HPI/OVERNIGHT EVENTS:    Extubated yesterday and he was tachypneic with RR of 30. He was on BIPAP at night (as precaution since he was extubated and also has questionable ELIUD) and tolerated well.     PRESSORS: [ ] YES [ x] NO  WHICH:        Antimicrobial:  cefTRIAXone   IVPB      cefTRIAXone   IVPB 1000 milliGRAM(s) IV Intermittent every 24 hours    Cardiovascular:  midodrine 5 milliGRAM(s) Oral every 8 hours    Pulmonary:  guaiFENesin   Syrup  (Sugar-Free) 100 milliGRAM(s) Oral every 4 hours PRN    Hematalogic:  aspirin  chewable 81 milliGRAM(s) Oral daily  enoxaparin Injectable 120 milliGRAM(s) SubCutaneous two times a day    Other:  acetaminophen    Suspension .. 650 milliGRAM(s) Oral every 6 hours PRN  atorvastatin 80 milliGRAM(s) Oral at bedtime  chlorhexidine 2% Cloths 1 Application(s) Topical <User Schedule>  dextrose 50% Injectable 25 Gram(s) IV Push once  insulin lispro (ADMELOG) corrective regimen sliding scale   SubCutaneous Before meals and at bedtime    acetaminophen    Suspension .. 650 milliGRAM(s) Oral every 6 hours PRN  aspirin  chewable 81 milliGRAM(s) Oral daily  atorvastatin 80 milliGRAM(s) Oral at bedtime  cefTRIAXone   IVPB      cefTRIAXone   IVPB 1000 milliGRAM(s) IV Intermittent every 24 hours  chlorhexidine 2% Cloths 1 Application(s) Topical <User Schedule>  dextrose 50% Injectable 25 Gram(s) IV Push once  enoxaparin Injectable 120 milliGRAM(s) SubCutaneous two times a day  guaiFENesin   Syrup  (Sugar-Free) 100 milliGRAM(s) Oral every 4 hours PRN  insulin lispro (ADMELOG) corrective regimen sliding scale   SubCutaneous Before meals and at bedtime  midodrine 5 milliGRAM(s) Oral every 8 hours    Drug Dosing Weight  Height (cm): 170.2 (19 Feb 2021 08:00)  Weight (kg): 120.6 (19 Feb 2021 02:00)  BMI (kg/m2): 41.6 (19 Feb 2021 08:00)  BSA (m2): 2.28 (19 Feb 2021 08:00)        ICU Vital Signs Last 24 Hrs  T(C): 36.4 (27 Feb 2021 00:00), Max: 37.1 (26 Feb 2021 13:00)  T(F): 97.6 (27 Feb 2021 00:00), Max: 98.7 (26 Feb 2021 13:00)  HR: 82 (27 Feb 2021 00:10) (54 - 113)  BP: 120/78 (27 Feb 2021 00:00) (94/65 - 134/80)  BP(mean): 91 (27 Feb 2021 00:00) (76 - 97)  ABP: --  ABP(mean): --  RR: 18 (27 Feb 2021 00:00) (18 - 34)  SpO2: 96% (27 Feb 2021 00:10) (93% - 99%)      ABG - ( 26 Feb 2021 03:42 )  pH, Arterial: 7.46  pH, Blood: x     /  pCO2: 37    /  pO2: 81    / HCO3: 26    / Base Excess: 3.2   /  SaO2: 96                    02-25 @ 07:01  -  02-26 @ 07:00  --------------------------------------------------------  IN: 803 mL / OUT: 1015 mL / NET: -212 mL        Mode: CPAP with PS  FiO2: 40  PEEP: 5  PS: 7  ITime: 1  MAP: 9  PIP: 17          PHYSICAL EXAM:   GENERAL: obese , , awake, opens eyes with verbal stimulation  HEAD:  Atraumatic, Normocephalic  EYES: EOMI, PERRLA, conjunctiva and sclera clear  NERVOUS SYSTEM:  Alert, eyes opens and tracks. Minimally moves extremities when instructed to.   CHEST/LUNG: B/L good air entry; No rales, rhonchi, or wheezing and is on BIPAP  HEART: S1S2 normal, no S3, Regular rate and rhythm; No murmurs, rubs, or gallops  ABDOMEN: Soft, Nontender, Nondistended; Bowel sounds present  EXTREMITIES:  2+ Peripheral Pulses, bilateral LE oedema and scrotal oedema  LYMPH: No lymphadenopathy noted  SKIN: No rashes or lesions    Care Discussed with Consultants/Other Providers [ x] YES  [ ] NO      LABS:  CBC Full  -  ( 26 Feb 2021 05:10 )  WBC Count : 6.22 K/uL  RBC Count : 3.85 M/uL  Hemoglobin : 12.2 g/dL  Hematocrit : 37.9 %  Platelet Count - Automated : 169 K/uL  Mean Cell Volume : 98.4 fl  Mean Cell Hemoglobin : 31.7 pg  Mean Cell Hemoglobin Concentration : 32.2 gm/dL  Auto Neutrophil # : x  Auto Lymphocyte # : x  Auto Monocyte # : x  Auto Eosinophil # : x  Auto Basophil # : x  Auto Neutrophil % : x  Auto Lymphocyte % : x  Auto Monocyte % : x  Auto Eosinophil % : x  Auto Basophil % : x    02-26    145  |  111<H>  |  41<H>  ----------------------------<  137<H>  3.9   |  29  |  1.11    Ca    8.7      26 Feb 2021 05:10  Phos  2.9     02-26  Mg     2.4     02-26    TPro  6.5  /  Alb  2.0<L>  /  TBili  0.5  /  DBili  x   /  AST  62<H>  /  ALT  124<H>  /  AlkPhos  186<H>  02-26    PT/INR - ( 25 Feb 2021 03:42 )   PT: 15.2 sec;   INR: 1.29 ratio                 RADIOLOGY & ADDITIONAL STUDIES REVIEWED:  ***    [ ]GOALS OF CARE DISCUSSION WITH PATIENT/FAMILY/PROXY:    CRITICAL CARE TIME SPENT: 35 minutes   · Subjective and Objective:   Patient is a 75y old  Male who presents with a chief complaint of Unresponsive (26 Feb 2021 14:18)      INTERVAL HPI/OVERNIGHT EVENTS:    Extubated yesterday and he was tachypneic with RR of 30. He was on BIPAP at night (as precaution since he was extubated and also has questionable ELIUD) and tolerated well.     PRESSORS: [ ] YES [ x] NO  WHICH:        Antimicrobial:  cefTRIAXone   IVPB      cefTRIAXone   IVPB 1000 milliGRAM(s) IV Intermittent every 24 hours    Cardiovascular:  midodrine 5 milliGRAM(s) Oral every 8 hours    Pulmonary:  guaiFENesin   Syrup  (Sugar-Free) 100 milliGRAM(s) Oral every 4 hours PRN    Hematalogic:  aspirin  chewable 81 milliGRAM(s) Oral daily  enoxaparin Injectable 120 milliGRAM(s) SubCutaneous two times a day    Other:  acetaminophen    Suspension .. 650 milliGRAM(s) Oral every 6 hours PRN  atorvastatin 80 milliGRAM(s) Oral at bedtime  chlorhexidine 2% Cloths 1 Application(s) Topical <User Schedule>  dextrose 50% Injectable 25 Gram(s) IV Push once  insulin lispro (ADMELOG) corrective regimen sliding scale   SubCutaneous Before meals and at bedtime    acetaminophen    Suspension .. 650 milliGRAM(s) Oral every 6 hours PRN  aspirin  chewable 81 milliGRAM(s) Oral daily  atorvastatin 80 milliGRAM(s) Oral at bedtime  cefTRIAXone   IVPB      cefTRIAXone   IVPB 1000 milliGRAM(s) IV Intermittent every 24 hours  chlorhexidine 2% Cloths 1 Application(s) Topical <User Schedule>  dextrose 50% Injectable 25 Gram(s) IV Push once  enoxaparin Injectable 120 milliGRAM(s) SubCutaneous two times a day  guaiFENesin   Syrup  (Sugar-Free) 100 milliGRAM(s) Oral every 4 hours PRN  insulin lispro (ADMELOG) corrective regimen sliding scale   SubCutaneous Before meals and at bedtime  midodrine 5 milliGRAM(s) Oral every 8 hours    Drug Dosing Weight  Height (cm): 170.2 (19 Feb 2021 08:00)  Weight (kg): 120.6 (19 Feb 2021 02:00)  BMI (kg/m2): 41.6 (19 Feb 2021 08:00)  BSA (m2): 2.28 (19 Feb 2021 08:00)        ICU Vital Signs Last 24 Hrs  T(C): 36.4 (27 Feb 2021 00:00), Max: 37.1 (26 Feb 2021 13:00)  T(F): 97.6 (27 Feb 2021 00:00), Max: 98.7 (26 Feb 2021 13:00)  HR: 82 (27 Feb 2021 00:10) (54 - 113)  BP: 120/78 (27 Feb 2021 00:00) (94/65 - 134/80)  BP(mean): 91 (27 Feb 2021 00:00) (76 - 97)  ABP: --  ABP(mean): --  RR: 18 (27 Feb 2021 00:00) (18 - 34)  SpO2: 96% (27 Feb 2021 00:10) (93% - 99%)      ABG - ( 26 Feb 2021 03:42 )  pH, Arterial: 7.46  pH, Blood: x     /  pCO2: 37    /  pO2: 81    / HCO3: 26    / Base Excess: 3.2   /  SaO2: 96                    02-25 @ 07:01  -  02-26 @ 07:00  --------------------------------------------------------  IN: 803 mL / OUT: 1015 mL / NET: -212 mL        Mode: CPAP with PS  FiO2: 40  PEEP: 5  PS: 7  ITime: 1  MAP: 9  PIP: 17          PHYSICAL EXAM:   GENERAL: obese , , awake, opens eyes with verbal stimulation  HEAD:  Atraumatic, Normocephalic  EYES: EOMI, PERRLA, conjunctiva and sclera clear  NERVOUS SYSTEM:  Alert, eyes opens and tracks. Minimally moves extremities when instructed to. Right hemiparesis  CHEST/LUNG: B/L good air entry; No rales, rhonchi, or wheezing and is on BIPAP  HEART: S1S2 normal, no S3, Regular rate and rhythm; No murmurs, rubs, or gallops  ABDOMEN: Soft, Nontender, Nondistended; Bowel sounds present  EXTREMITIES:  2+ Peripheral Pulses, bilateral LE oedema and scrotal oedema  LYMPH: No lymphadenopathy noted  SKIN: No rashes or lesions    Care Discussed with Consultants/Other Providers [ x] YES  [ ] NO      LABS:  CBC Full  -  ( 26 Feb 2021 05:10 )  WBC Count : 6.22 K/uL  RBC Count : 3.85 M/uL  Hemoglobin : 12.2 g/dL  Hematocrit : 37.9 %  Platelet Count - Automated : 169 K/uL  Mean Cell Volume : 98.4 fl  Mean Cell Hemoglobin : 31.7 pg  Mean Cell Hemoglobin Concentration : 32.2 gm/dL  Auto Neutrophil # : x  Auto Lymphocyte # : x  Auto Monocyte # : x  Auto Eosinophil # : x  Auto Basophil # : x  Auto Neutrophil % : x  Auto Lymphocyte % : x  Auto Monocyte % : x  Auto Eosinophil % : x  Auto Basophil % : x    02-26    145  |  111<H>  |  41<H>  ----------------------------<  137<H>  3.9   |  29  |  1.11    Ca    8.7      26 Feb 2021 05:10  Phos  2.9     02-26  Mg     2.4     02-26    TPro  6.5  /  Alb  2.0<L>  /  TBili  0.5  /  DBili  x   /  AST  62<H>  /  ALT  124<H>  /  AlkPhos  186<H>  02-26    PT/INR - ( 25 Feb 2021 03:42 )   PT: 15.2 sec;   INR: 1.29 ratio                 RADIOLOGY & ADDITIONAL STUDIES REVIEWED:  ***    [ ]GOALS OF CARE DISCUSSION WITH PATIENT/FAMILY/PROXY:    CRITICAL CARE TIME SPENT: 35 minutes

## 2021-02-27 NOTE — PROGRESS NOTE ADULT - ASSESSMENT
Assessment and Plan:   · Assessment	  75 Y M from [facility] rehab, non ambulatory with PMhx of CVA( twice first in July 2020, second 3 weeks ago), HTN, HLD, Prediabetes,BPH was brought in from NH for unresponsiveness.   ED course: Pt was unresponsive so was intubated in The ED and Femoral line was placed by the ED physician   Vitals : Febrile to 103 F , , /108  Labs : wbc 6k ,lactate 1.2, Bun/CR 40/1.56  T1 0.018  ECG : NSR   CXR : clear    Pt will be admitted to ICU for Acute hypoxic Respiratory Failure and Septic shock         Assessment:  1. Acute Hypoxic Respiratory Failure     2. Acute Encephalopathy   3. Septic shock 2/2 UTI  4. CVA   5. pre-diabetes   6. HTN   7. BPH     Plan:    =====================[CNS ]==============================  # Acute Encephalopathy :    - recent stroke 3 weeks ago , alert ,oriented and follows commands as per NH papers at baseline, confused and memory loss after stroke as per wife   -  CTH no acute event ,Age-indeterminate left PCA territory infarction.      # CVA :   - L post cerebral A stroke as per NH papers   - Pt is on Aspirin, Full Dose lovenox(in place of NOAC) and statin        =====================[CVS ]==============================  # Sepsis  - likely 2/2 Urosepsis   - UA +ve   - midodrine tapered to 5mg TID  continue with  ceftriaxone  - Repeat Cultures negative to date  - Initial culture with enterococcus faecalis and Coag Neg staph, Urine culture with Klebsiella      # HTN :   - pt is on toprol, losartan and nifedipine   - Hold BP medications   - Levo dced, on midodrine taper  -  Echo grade 1 diastolic dysfuction,    =====================[RESP ]==============================  # Acute Hypoxic Resp Failure :   started on  BiPAP at night     =====================[ GI ]================================  # No issues :    speech and swallow ordered  ====================[ RENAL ]=============================   # URSULA over CKD :   - Improved  =====================[ ID ]================================  # Urosepsis :   - UA +ve   urine culture klebsiella  continue with ceftriaxone  - Repeat cultures negative to date       ===================[ HEME/ONC ]===========================  # No issues :  - Hb and Plt stable   - monitor cbc daily     =====================[ ENDO ]=============================  # Pre -diabetes : -  - Last A1c 6.2   - target CBG < 180  - FS q6 while NPO, will start on ISS   - blood glucose well controlled    ==================[ SKIN/ CATHETERS ]======================  # no wound or skin break   ==================[ PROPHYLAXIS ]==========================   # Dvt : full dose lovenox  # Gi : Protonix     ====================[ DISPO ]==============================   - Monitor in ICU     ===================[ PROGNOSIS ]===========================  - improving   Assessment and Plan:   · Assessment	  75 Y M from [facility] rehab, non ambulatory with PMhx of CVA( twice first in July 2020, second 3 weeks ago), HTN, HLD, Prediabetes,BPH was brought in from NH for unresponsiveness.   ED course: Pt was unresponsive so was intubated in The ED and Femoral line was placed by the ED physician   Vitals : Febrile to 103 F , , /108  Labs : wbc 6k ,lactate 1.2, Bun/CR 40/1.56  T1 0.018  ECG : NSR   CXR : clear    Pt will be admitted to ICU for Acute hypoxic Respiratory Failure and Septic shock         Assessment:  1. Acute Hypoxic Respiratory Failure     2. Acute Encephalopathy   3. Septic shock 2/2 UTI  4. CVA   5. pre-diabetes   6. HTN   7. BPH     Plan:    =====================[CNS ]==============================  # Acute Encephalopathy :    - recent stroke 3 weeks ago , alert ,oriented and follows commands as per NH papers at baseline, confused and memory loss after stroke as per wife   - Patient is mainly polish speaking, is back to his baseline mental status   -  CTH no acute event ,Age-indeterminate left PCA territory infarction.      # CVA :   - L post cerebral A stroke as per NH papers   - Pt is on Aspirin, Full Dose lovenox(in place of NOAC) and statin        =====================[CVS ]==============================  # Sepsis  - likely 2/2 Urosepsis   - UA +ve   - midodrine tapered to 5mg TID, Will continue for now   continue with  ceftriaxone  - Repeat Cultures negative to date  - Initial culture with enterococcus faecalis and Coag Neg staph, Urine culture with Klebsiella      # HTN :   - pt is on Toprol, losartan and nifedipine   - Hold BP medications   - Levo dced, on midodrine taper  -  Echo grade 1 diastolic dysfunction,    =====================[RESP ]==============================  # Acute Hypoxic Resp Failure :   started on  BiPAP at night , Currently on NC     =====================[ GI ]================================  # No issues :    speech and swallow recommends puree diet  ====================[ RENAL ]=============================   # URSULA over CKD :   - Improved  - Will give Lasix 20mg   - Remove jackson and TOV    =====================[ ID ]================================  # Urosepsis :   - UA +ve   urine culture klebsiella  continue with ceftriaxone  - Repeat cultures negative to date       ===================[ HEME/ONC ]===========================  # No issues :  - Hb and Plt stable   - monitor cbc daily     =====================[ ENDO ]=============================  # Pre -diabetes : -  - Last A1c 6.2   - target CBG < 180  - FS q6 while NPO, will start on ISS   - blood glucose well controlled    ==================[ SKIN/ CATHETERS ]======================  # no wound or skin break   ==================[ PROPHYLAXIS ]==========================   # Dvt : full dose Lovenox  # Gi : Protonix     ====================[ DISPO ]==============================   - Plan to downgrade to medicine floor   - PT evaluation     ===================[ PROGNOSIS ]===========================  - improving

## 2021-02-28 ENCOUNTER — TRANSCRIPTION ENCOUNTER (OUTPATIENT)
Age: 76
End: 2021-02-28

## 2021-02-28 LAB
ALBUMIN SERPL ELPH-MCNC: 2.4 G/DL — LOW (ref 3.5–5)
ALP SERPL-CCNC: 160 U/L — HIGH (ref 40–120)
ALT FLD-CCNC: 86 U/L DA — HIGH (ref 10–60)
ANION GAP SERPL CALC-SCNC: 5 MMOL/L — SIGNIFICANT CHANGE UP (ref 5–17)
AST SERPL-CCNC: 47 U/L — HIGH (ref 10–40)
BASOPHILS # BLD AUTO: 0.02 K/UL — SIGNIFICANT CHANGE UP (ref 0–0.2)
BASOPHILS NFR BLD AUTO: 0.3 % — SIGNIFICANT CHANGE UP (ref 0–2)
BILIRUB SERPL-MCNC: 0.5 MG/DL — SIGNIFICANT CHANGE UP (ref 0.2–1.2)
BUN SERPL-MCNC: 37 MG/DL — HIGH (ref 7–18)
CALCIUM SERPL-MCNC: 8.8 MG/DL — SIGNIFICANT CHANGE UP (ref 8.4–10.5)
CHLORIDE SERPL-SCNC: 109 MMOL/L — HIGH (ref 96–108)
CO2 SERPL-SCNC: 29 MMOL/L — SIGNIFICANT CHANGE UP (ref 22–31)
CREAT SERPL-MCNC: 1 MG/DL — SIGNIFICANT CHANGE UP (ref 0.5–1.3)
EOSINOPHIL # BLD AUTO: 0.34 K/UL — SIGNIFICANT CHANGE UP (ref 0–0.5)
EOSINOPHIL NFR BLD AUTO: 5.6 % — SIGNIFICANT CHANGE UP (ref 0–6)
GLUCOSE BLDC GLUCOMTR-MCNC: 114 MG/DL — HIGH (ref 70–99)
GLUCOSE BLDC GLUCOMTR-MCNC: 96 MG/DL — SIGNIFICANT CHANGE UP (ref 70–99)
GLUCOSE BLDC GLUCOMTR-MCNC: 97 MG/DL — SIGNIFICANT CHANGE UP (ref 70–99)
GLUCOSE BLDC GLUCOMTR-MCNC: 97 MG/DL — SIGNIFICANT CHANGE UP (ref 70–99)
GLUCOSE SERPL-MCNC: 92 MG/DL — SIGNIFICANT CHANGE UP (ref 70–99)
HCT VFR BLD CALC: 39.6 % — SIGNIFICANT CHANGE UP (ref 39–50)
HGB BLD-MCNC: 13 G/DL — SIGNIFICANT CHANGE UP (ref 13–17)
IMM GRANULOCYTES NFR BLD AUTO: 0.8 % — SIGNIFICANT CHANGE UP (ref 0–1.5)
LYMPHOCYTES # BLD AUTO: 1.51 K/UL — SIGNIFICANT CHANGE UP (ref 1–3.3)
LYMPHOCYTES # BLD AUTO: 24.8 % — SIGNIFICANT CHANGE UP (ref 13–44)
MAGNESIUM SERPL-MCNC: 2.3 MG/DL — SIGNIFICANT CHANGE UP (ref 1.6–2.6)
MCHC RBC-ENTMCNC: 32.2 PG — SIGNIFICANT CHANGE UP (ref 27–34)
MCHC RBC-ENTMCNC: 32.8 GM/DL — SIGNIFICANT CHANGE UP (ref 32–36)
MCV RBC AUTO: 98 FL — SIGNIFICANT CHANGE UP (ref 80–100)
MONOCYTES # BLD AUTO: 0.42 K/UL — SIGNIFICANT CHANGE UP (ref 0–0.9)
MONOCYTES NFR BLD AUTO: 6.9 % — SIGNIFICANT CHANGE UP (ref 2–14)
NEUTROPHILS # BLD AUTO: 3.74 K/UL — SIGNIFICANT CHANGE UP (ref 1.8–7.4)
NEUTROPHILS NFR BLD AUTO: 61.6 % — SIGNIFICANT CHANGE UP (ref 43–77)
NRBC # BLD: 0 /100 WBCS — SIGNIFICANT CHANGE UP (ref 0–0)
PHOSPHATE SERPL-MCNC: 3.8 MG/DL — SIGNIFICANT CHANGE UP (ref 2.5–4.5)
PLATELET # BLD AUTO: 188 K/UL — SIGNIFICANT CHANGE UP (ref 150–400)
POTASSIUM SERPL-MCNC: 4.1 MMOL/L — SIGNIFICANT CHANGE UP (ref 3.5–5.3)
POTASSIUM SERPL-SCNC: 4.1 MMOL/L — SIGNIFICANT CHANGE UP (ref 3.5–5.3)
PROT SERPL-MCNC: 6.9 G/DL — SIGNIFICANT CHANGE UP (ref 6–8.3)
RBC # BLD: 4.04 M/UL — LOW (ref 4.2–5.8)
RBC # FLD: 11.9 % — SIGNIFICANT CHANGE UP (ref 10.3–14.5)
SODIUM SERPL-SCNC: 143 MMOL/L — SIGNIFICANT CHANGE UP (ref 135–145)
WBC # BLD: 6.08 K/UL — SIGNIFICANT CHANGE UP (ref 3.8–10.5)
WBC # FLD AUTO: 6.08 K/UL — SIGNIFICANT CHANGE UP (ref 3.8–10.5)

## 2021-02-28 RX ADMIN — CEFTRIAXONE 100 MILLIGRAM(S): 500 INJECTION, POWDER, FOR SOLUTION INTRAMUSCULAR; INTRAVENOUS at 17:38

## 2021-02-28 RX ADMIN — Medication 81 MILLIGRAM(S): at 14:20

## 2021-02-28 RX ADMIN — ENOXAPARIN SODIUM 120 MILLIGRAM(S): 100 INJECTION SUBCUTANEOUS at 05:36

## 2021-02-28 RX ADMIN — ENOXAPARIN SODIUM 120 MILLIGRAM(S): 100 INJECTION SUBCUTANEOUS at 17:38

## 2021-02-28 RX ADMIN — MIDODRINE HYDROCHLORIDE 5 MILLIGRAM(S): 2.5 TABLET ORAL at 05:36

## 2021-02-28 RX ADMIN — ATORVASTATIN CALCIUM 80 MILLIGRAM(S): 80 TABLET, FILM COATED ORAL at 22:28

## 2021-02-28 RX ADMIN — CHLORHEXIDINE GLUCONATE 1 APPLICATION(S): 213 SOLUTION TOPICAL at 05:36

## 2021-02-28 RX ADMIN — MIDODRINE HYDROCHLORIDE 5 MILLIGRAM(S): 2.5 TABLET ORAL at 22:28

## 2021-02-28 NOTE — DISCHARGE NOTE PROVIDER - NSDCCPCAREPLAN_GEN_ALL_CORE_FT
PRINCIPAL DISCHARGE DIAGNOSIS  Diagnosis: Respiratory failure  Assessment and Plan of Treatment: secondary to aspiration pneumonia with hypoxia, you were intubated and admitted to ICU initially, and extubated on 2/26, placed on BIPAP at night.   You were able to maintain good oxygen saturation on room air since down graded to floor on 2/26. continue to use BIPAP at night   respiratory failure cased by aspiration pneumonia and septic shock secondary to UTI with klebsiella. Pulmonologist and infectious disease specialist followed your case during this admission . You completed the antibiotic course, no more antibiotic needed at this time.   For aspiration pneumonia likely due to dysphagia as result of recurrent stroke, speech and swallow therapist evaluated swallowing ability and recommended pureed consistency with nectar thick liquids   continue aspiration precaution  If patient is having swallowing diffculty, please have speech and swallow pathology evaluation   BIPAP setting  BIPAP V60 O2 Titration Guideline: Device O2 Percent Range (%): %, Device O2 Flow Rate Range (LPM): N/A, O2 Titration Guideline: Increase/Decrease by 10%. Notify Provider for any increase in oxygen therapy or inability to achieve targeted SpO2  FIO2  80%  inspiratory ressure 15  expiratory pressure 5  back up rate 16         SECONDARY DISCHARGE DIAGNOSES  Diagnosis: Oropharyngeal dysphagia  Assessment and Plan of Treatment: it is difficulty swallowing food or liquids  please see as above for food consistency recommendations   swallowing is also difficult when talking, laughing or lyind down so keep elevated head while and after meals   If you have worsening of swallowing, coughing on food please address it to healthcare provider      Diagnosis: Sepsis due to Klebsiella  Assessment and Plan of Treatment: completed the course of antibiotics   provide enough fluids   SEEK MEDICAL CARE IF:  You have back pain.  You develop a fever.  Your symptoms do not begin to resolve within 3 days.  SEEK IMMEDIATE MEDICAL CARE IF:  You have severe back pain or lower abdominal pain.  You develop chills.  You have nausea or vomiting.  You have continued burning or discomfort with urination.      Diagnosis: Morbid obesity  Assessment and Plan of Treatment: Morbid obesity    Diagnosis: Transaminitis  Assessment and Plan of Treatment: Transaminitis    Diagnosis: Hypotension  Assessment and Plan of Treatment: Hypotension     PRINCIPAL DISCHARGE DIAGNOSIS  Diagnosis: Respiratory failure  Assessment and Plan of Treatment: secondary to aspiration pneumonia with hypoxia, you were intubated and admitted to ICU initially, and extubated on 2/26, placed on BIPAP at night.   You were able to maintain good oxygen saturation on room air since down graded to floor on 2/26. continue to use BIPAP at night   respiratory failure cased by aspiration pneumonia and septic shock secondary to UTI with klebsiella. Pulmonologist and infectious disease specialist followed your case during this admission . You completed the antibiotic course, no more antibiotic needed at this time.   For aspiration pneumonia likely due to dysphagia as result of recurrent stroke, speech and swallow therapist evaluated swallowing ability and recommended pureed consistency with nectar thick liquids   continue aspiration precaution  If patient is having swallowing diffculty, please have speech and swallow pathology evaluation   BIPAP setting  BIPAP V60 O2 Titration Guideline: Device O2 Percent Range (%): %, Device O2 Flow Rate Range (LPM): N/A, O2 Titration Guideline: Increase/Decrease by 10%. Notify Provider for any increase in oxygen therapy or inability to achieve targeted SpO2  FIO2  80%  inspiratory ressure 15  expiratory pressure 5  back up rate 16         SECONDARY DISCHARGE DIAGNOSES  Diagnosis: Oropharyngeal dysphagia  Assessment and Plan of Treatment: it is difficulty swallowing food or liquids  please see as above for food consistency recommendations   swallowing is also difficult when talking, laughing or lyind down so keep elevated head while and after meals   If you have worsening of swallowing, coughing on food please address it to healthcare provider      Diagnosis: Stroke  Assessment and Plan of Treatment: continue xarelto 20mg at dinner   monitor excessive bleeding from nose/ gums or rectum    Diagnosis: Sepsis due to Klebsiella  Assessment and Plan of Treatment: completed the course of antibiotics   provide enough fluids   SEEK MEDICAL CARE IF:  You have back pain.  You develop a fever.  Your symptoms do not begin to resolve within 3 days.  SEEK IMMEDIATE MEDICAL CARE IF:  You have severe back pain or lower abdominal pain.  You develop chills.  You have nausea or vomiting.  You have continued burning or discomfort with urination.      Diagnosis: Morbid obesity  Assessment and Plan of Treatment: BMI >41, continue to provider diet as nutritionist recommended, moniotr monthly weight    Diagnosis: Transaminitis  Assessment and Plan of Treatment: likely due to septic shock   down trending LFTs  normal live sonogram  monitor LFTs  avoids hepatotoxic medication       Diagnosis: Hypotension  Assessment and Plan of Treatment: Patient used to take anti blood pressure medication for hypertension. during this admission pt required pressor which transition to Midodrine   now off midodrine, resume only B-bloker 25mg tow times daily  monitor Vital sign closely and adjust medication as needed     PRINCIPAL DISCHARGE DIAGNOSIS  Diagnosis: Respiratory failure  Assessment and Plan of Treatment: secondary to aspiration pneumonia with hypoxia, you were intubated and admitted to ICU initially, and extubated on 2/26, placed on BIPAP at night.   You were able to maintain good oxygen saturation on room air since down graded to floor on 2/26. continue to use BIPAP at night   respiratory failure cased by aspiration pneumonia and septic shock secondary to UTI with klebsiella. Pulmonologist and infectious disease specialist followed your case during this admission . You completed the antibiotic course, no more antibiotic needed at this time.   For aspiration pneumonia likely due to dysphagia as result of recurrent stroke, speech and swallow therapist evaluated swallowing ability and recommended pureed consistency with nectar thick liquids   continue aspiration precaution  If patient is having swallowing diffculty, please have speech and swallow pathology evaluation   BIPAP setting  BIPAP V60 O2 Titration Guideline: Device O2 Percent Range (%): %, Device O2 Flow Rate Range (LPM): N/A, O2 Titration Guideline: Increase/Decrease by 10%. Notify Provider for any increase in oxygen therapy or inability to achieve targeted SpO2  FIO2  80%  inspiratory ressure 15  expiratory pressure 5  back up rate 16         SECONDARY DISCHARGE DIAGNOSES  Diagnosis: Stroke  Assessment and Plan of Treatment: continue xarelto 20mg at dinner   monitor excessive bleeding from nose/ gums or rectum    Diagnosis: Sepsis due to Klebsiella  Assessment and Plan of Treatment: completed the course of antibiotics   provide enough fluids   SEEK MEDICAL CARE IF:  You have back pain.  You develop a fever.  Your symptoms do not begin to resolve within 3 days.  SEEK IMMEDIATE MEDICAL CARE IF:  You have severe back pain or lower abdominal pain.  You develop chills.  You have nausea or vomiting.  You have continued burning or discomfort with urination.      Diagnosis: Morbid obesity  Assessment and Plan of Treatment: BMI >41, continue to provider diet as nutritionist recommended, moniotr monthly weight    Diagnosis: Oropharyngeal dysphagia  Assessment and Plan of Treatment: it is difficulty swallowing food or liquids  please see as above for food consistency recommendations   swallowing is also difficult when talking, laughing or lyind down so keep elevated head while and after meals   If you have worsening of swallowing, coughing on food please address it to healthcare provider      Diagnosis: Transaminitis  Assessment and Plan of Treatment: likely due to septic shock   down trending LFTs  normal live sonogram  monitor LFTs  avoids hepatotoxic medication       Diagnosis: Hypernatremia  Assessment and Plan of Treatment: You have high sodium levels related to decreased oral intake. Please repeat your BMP to monitor your sodiaum level. Please follow up with your Primary care physician in a week.    Diagnosis: Hypotension  Assessment and Plan of Treatment: Patient used to take anti blood pressure medication for hypertension. during this admission pt required pressor which transition to Midodrine   now off midodrine, resume only B-bloker 25mg tow times daily  monitor Vital sign closely and adjust medication as needed

## 2021-02-28 NOTE — DISCHARGE NOTE PROVIDER - HOSPITAL COURSE
76 y/o male  [facility], non ambulatory with PMhx of CVA( twice first in July 2020, second 3 weeks ago), HTN, HLD, Prediabetes, BPH was brought in from NH for unresponsiveness. Pt was intubated in the ED and femoral line was placed. Patient was admitted to ICU for hypoxic respiratory failure and septic shock 2/2 UTI, requireing pressors.  CXR no acute fondings  Vancomycin and zosyn started   Meropenem  for broad spectrum coverage. Blood cultures grew Enterococci.  antibiotics were de-escalated to Rocephin as urine cultures were growing pan sensitive Klebsiella.  Repeat cultures came back negative  Patient's pressor support progressively decreased, after a few trials of SBT, Patient was extubated on 2/26 after a successful SBT. He was started on Midodrine 5mg   Due to concerns for worsening increasing tachypnea after intubation, patient was placed on nocturnal BIPAP on 2/26 for hypoxia. The  clinical condition improved and patient was transferred to medicine floor on 2/28.    76 [facility]  [facility] non [facility]ulatory with PMhx of CVA( twice first in July 2020, second 3 weeks ago), HTN, HLD, Prediabetes, BPH was brought in from NH for unresponsiveness. Pt was intubated in the ED and femoral line was placed. Patient was admitted to ICU for hypoxic respiratory failure likely due to aspiration pneumonia and septic shock 2/2 UTI, requireing pressors.  CXR no acute fondings  Vancomycin and zosyn started   Meropenem  for broad spectrum coverage. Blood cultures grew Enterococci.  antibiotics were de-escalated to Rocephin as urine cultures were growing pan sensitive Klebsiella.  Repeat cultures came back negative, completed antibiotic course during this admission as ID recommended.   Patient's pressor support progressively decreased, after a few trials of SBT, Patient was extubated on 2/26 after a successful SBT. He was started on Midodrine 5mg   Due to concerns for worsening increasing tachypnea after intubation, patient was placed on nocturnal BIPAP on 2/26 for hypoxia. The  clinical condition improved and patient was transferred to medicine floor on 2/28.  On medicine floor pt was stable without episode of hypoxia on RA, afebrile since down graded, BP remained  stable, d/jeffrey midodrine, resume B-B 25mg BID for tachycardia.   Due to dysphagia, pt was seen by SLP, tolerating well puree/ nectar thick liquids. PT recommended JAQUELINE. COVID tested negative x 4 times entire hospital stay.   Given clinical improvement, discussed with attending, d/c to [facility]QUELINE.   Please refer to medical records for entire hospital course since this is only brief medical summary.    74 y/o male  [facility], non ambulatory with PMhx of CVA( twice first in July 2020, second 3 weeks ago), HTN, HLD, Prediabetes, BPH was brought in from NH for unresponsiveness. Pt was intubated in the ED and femoral line was placed. Patient was admitted to ICU for hypoxic respiratory failure likely due to aspiration pneumonia and septic shock 2/2 UTI, requireing pressors.  CXR no acute fondings  Vancomycin and zosyn started   Meropenem  for broad spectrum coverage. Blood cultures grew Enterococci.  antibiotics were de-escalated to Rocephin as urine cultures were growing pan sensitive Klebsiella.  Repeat cultures came back negative, completed antibiotic course during this admission as ID recommended.   Patient's pressor support progressively decreased, after a few trials of SBT, Patient was extubated on 2/26 after a successful SBT. He was started on Midodrine 5mg   Due to concerns for worsening increasing tachypnea after intubation, patient was placed on nocturnal BIPAP on 2/26 for hypoxia. The  clinical condition improved and patient was transferred to medicine floor on 2/28.  On medicine floor pt was stable without episode of hypoxia on RA, afebrile since down graded, BP remained  stable, d/jeffrey midodrine, resume B-B 25mg BID for tachycardia.   Due to dysphagia, pt was seen by SLP, tolerating well puree/ nectar thick liquids. PT recommended JAQUELINE. COVID tested negative x 4 times entire hospital stay.   Given clinical improvement, discussed with attending, d/c to JAQUELINE.       >>>>>>>>>>>>>>>>>>>>>>>>>>>>>>>INCOMPLETE>>>>>>>>>>>>>>>>>>>>>>>>>>>>>>>> 76 y/o male  from [facility], non ambulatory with PMhx of CVA( twice first in July 2020, second 3 weeks ago), HTN, HLD, Prediabetes, BPH was brought in from NH for unresponsiveness. Pt was intubated in the ED and femoral line was placed. Patient was admitted to ICU for hypoxic respiratory failure likely due to aspiration pneumonia and septic shock 2/2 UTI, requireing pressors.  CXR no acute fondings  Vancomycin and zosyn started   Meropenem  for broad spectrum coverage. Blood cultures grew Enterococci.  antibiotics were de-escalated to Rocephin as urine cultures were growing pan sensitive Klebsiella.  Repeat cultures came back negative, completed antibiotic course during this admission as ID recommended.   Patient's pressor support progressively decreased, after a few trials of SBT, Patient was extubated on 2/26 after a successful SBT. He was started on Midodrine 5mg   Due to concerns for worsening increasing tachypnea after intubation, patient was placed on nocturnal BIPAP on 2/26 for hypoxia. The  clinical condition improved and patient was transferred to medicine floor on 2/28.  On medicine floor pt was stable without episode of hypoxia on RA, afebrile since down graded, BP remained  stable, d/jeffrey midodrine, resume Beta Blocker 25mg BID for tachycardia.   Due to dysphagia, pt was seen by SLP, tolerating well puree/ nectar thick liquids. PT recommended JAQUELINE. COVID tested negative x 4 times entire hospital stay.   Given clinical improvement, discussed with attending, d/c to JAQUELINE.       >>>>>>>>>>>>>>>>>>>>>>>>>>>>>>>INCOMPLETE>>>>>>>>>>>>>>>>>>>>>>>>>>>>>>>> 74 [facility] male  [facility], non [facility]ulatory with PMhx of CVA( twice first in July 2020, second 3 weeks ago), HTN, HLD, Prediabetes, BPH was brought in from NH for unresponsiveness. Pt was intubated in the ED and femoral line was placed. Patient was admitted to ICU for hypoxic respiratory failure likely due to aspiration pneumonia and septic shock 2/2 UTI, requireing pressors.  CXR no acute fondings  Vancomycin and zosyn started   Meropenem  for broad spectrum coverage. Blood cultures grew Enterococci.  antibiotics were de-escalated to Rocephin as urine cultures were growing pan sensitive Klebsiella.  Repeat cultures came back negative, completed antibiotic course during this admission as ID recommended.   Patient's pressor support progressively decreased, after a few trials of SBT, Patient was extubated on 2/26 after a successful SBT. He was started on Midodrine 5mg   Due to concerns for worsening increasing tachypnea after intubation, patient was placed on nocturnal BIPAP on 2/26 for hypoxia. The  clinical condition improved and patient was transferred to medicine floor on 2/28.  On medicine floor pt was stable without episode of hypoxia on RA, afebrile since down graded, BP remained  stable, d/jeffrey midodrine, resume Beta Blocker 25mg BID for tachycardia.   Due to dysphagia, pt was seen by SLP, tolerating well puree/ nectar thick liquids. PT recommended JAQUELINE. COVID tested negative x 5 times entire hospital stay.  Last negative, 3/7/2021   Given clinical improvement, discussed with attending, d/c to JAQUELINE.     Please refer to Pt's complete medical chart with documents for a full hospital course, for this is only a brief summary.   77 [facility] male  [facility], non ambulatory with PMhx of CVA( twice first in July 2020, second 3 weeks ago), HTN, HLD, Prediabetes, BPH was brought in from NH for unresponsiveness. Pt was intubated in the ED and femoral line was placed. Patient was admitted to ICU for hypoxic respiratory failure likely due to aspiration pneumonia and septic shock 2/2 UTI, requireing pressors.  CXR no acute fondings  Vancomycin and zosyn started   Meropenem  for broad spectrum coverage. Blood cultures grew Enterococci.  antibiotics were de-escalated to Rocephin as urine cultures were growing pan sensitive Klebsiella.  Repeat cultures came back negative, completed antibiotic course during this admission as ID recommended.   Patient's pressor support progressively decreased, after a few trials of SBT, Patient was extubated on 2/26 after a successful SBT. He was started on Midodrine 5mg   Due to concerns for worsening increasing tachypnea after intubation, patient was placed on nocturnal BIPAP on 2/26 for hypoxia. The  clinical condition improved and patient was transferred to medicine floor on 2/28.  On medicine floor pt was stable without episode of hypoxia on RA, afebrile since down graded, BP remained  stable, d/jeffrey midodrine, resume Beta Blocker 25mg BID for tachycardia.   Due to dysphagia, pt was seen by SLP, tolerating well puree/ nectar thick liquids. PT recommended JAQUELINE. COVID tested negative x 5 times entire hospital stay.  Last negative, 3/7/2021   Given clinical improvement, discussed with attending, d/c to JAQUELINE.     Please refer to Pt's complete medical chart with documents for a full hospital course, for this is only a brief summary.

## 2021-02-28 NOTE — PROGRESS NOTE ADULT - SUBJECTIVE AND OBJECTIVE BOX
Patient is a 75y old  Male who presents with a chief complaint of Unresponsive (27 Feb 2021 13:02)/metabolic encephalopathy/septic shock/aspiration PNA/UTI/intubation/extubation/shock liver, patient improved, tolerated po diet, PT eval, D/C planning      INTERVAL HPI/OVERNIGHT EVENTS:  T(C): 36.7 (02-28-21 @ 04:46), Max: 37.3 (02-27-21 @ 20:00)  HR: 93 (02-28-21 @ 08:35) (89 - 100)  BP: 117/81 (02-28-21 @ 04:46) (117/72 - 141/80)  RR: 20 (02-28-21 @ 04:46) (20 - 27)  SpO2: 96% (02-28-21 @ 08:35) (94% - 100%)  Wt(kg): --    LABS:                        13.0   6.08  )-----------( 188      ( 28 Feb 2021 06:01 )             39.6     02-28    143  |  109<H>  |  37<H>  ----------------------------<  92  4.1   |  29  |  1.00    Ca    8.8      28 Feb 2021 06:01  Phos  3.8     02-28  Mg     2.3     02-28    TPro  6.9  /  Alb  2.4<L>  /  TBili  0.5  /  DBili  x   /  AST  47<H>  /  ALT  86<H>  /  AlkPhos  160<H>  02-28        CAPILLARY BLOOD GLUCOSE      POCT Blood Glucose.: 97 mg/dL (28 Feb 2021 07:46)  POCT Blood Glucose.: 111 mg/dL (27 Feb 2021 20:56)  POCT Blood Glucose.: 90 mg/dL (27 Feb 2021 16:27)  POCT Blood Glucose.: 95 mg/dL (27 Feb 2021 11:08)        RADIOLOGY & ADDITIONAL TESTS:    Consultant(s) Notes Reviewed:  [x ] YES  [ ] NO    PHYSICAL EXAM:  GENERAL: well built, well nourished  HEAD:  Atraumatic, Normocephalic  EYES: EOMI, PERRLA, conjunctiva and sclera clear  ENT: No tonsillar erythema, exudates, or enlargement; Moist mucous membranes, Good dentition, No lesions  NECK: Supple, No JVD, Normal thyroid, no enlarged nodes  NERVOUS SYSTEM:  Alert & Oriented X3, Good concentration; Motor Strength 5/5 B/L upper and lower extremities; DTRs 2+ intact and symmetric, sensory intact  CHEST/LUNG: B/L good air entry; No rales, rhonchi, or wheezing  HEART: S1S2 normal, no S3, Regular rate and rhythm; No murmurs, rubs, or gallops  ABDOMEN: Soft, Nontender, Nondistended; Bowel sounds present  EXTREMITIES:  2+ Peripheral Pulses, No clubbing, cyanosis, positive   LYMPH: No lymphadenopathy noted  SKIN: No rashes or lesions    Care Discussed with Consultants/Other Providers [ x] YES  [ ] NO

## 2021-02-28 NOTE — DISCHARGE NOTE PROVIDER - NSDCMRMEDTOKEN_GEN_ALL_CORE_FT
aspirin 81 mg oral tablet, chewable: 1 tab(s) orally once a day  Cozaar 100 mg oral tablet: 1 tab(s) orally once a day  Crestor 20 mg oral tablet: 1 tab(s) orally once a day  Imdur 60 mg oral tablet, extended release: 1 tab(s) orally once a day (in the morning)  Melatonin 3 mg oral tablet: 1 tab(s) orally once a day (at bedtime)  Symbicort 160 mcg-4.5 mcg/inh inhalation aerosol: 2 puff(s) inhaled 2 times a day  tamsulosin 0.4 mg oral capsule: 1 cap(s) orally once a day  Toprol-XL 50 mg oral tablet, extended release: 1 tab(s) orally once a day  Xarelto 20 mg oral tablet: 1 tab(s) orally once a day (in the evening)   acetaminophen 160 mg/5 mL oral suspension: 20.31 milliliter(s) orally every 6 hours, As needed, Temp greater or equal to 38C (100.4F), Mild Pain (1 - 3)  aspirin 81 mg oral tablet, chewable: 1 tab(s) orally once a day  atorvastatin 80 mg oral tablet: 1 tab(s) orally once a day (at bedtime)  metoprolol succinate 25 mg oral tablet, extended release: 1 tab(s) orally once a day  hold if SBP &lt;110 HR &lt;60   ocular lubricant ophthalmic solution: 1 drop(s) to each affected eye every 4 hours, As needed, Dry Eyes  Symbicort 160 mcg-4.5 mcg/inh inhalation aerosol: 2 puff(s) inhaled 2 times a day  Xarelto 20 mg oral tablet: 1 tab(s) orally once a day (in the evening)

## 2021-03-01 DIAGNOSIS — E66.01 MORBID (SEVERE) OBESITY DUE TO EXCESS CALORIES: ICD-10-CM

## 2021-03-01 DIAGNOSIS — A41.4 SEPSIS DUE TO ANAEROBES: ICD-10-CM

## 2021-03-01 DIAGNOSIS — R74.01 ELEVATION OF LEVELS OF LIVER TRANSAMINASE LEVELS: ICD-10-CM

## 2021-03-01 DIAGNOSIS — R13.10 DYSPHAGIA, UNSPECIFIED: ICD-10-CM

## 2021-03-01 DIAGNOSIS — Z02.9 ENCOUNTER FOR ADMINISTRATIVE EXAMINATIONS, UNSPECIFIED: ICD-10-CM

## 2021-03-01 DIAGNOSIS — I95.9 HYPOTENSION, UNSPECIFIED: ICD-10-CM

## 2021-03-01 DIAGNOSIS — J18.9 PNEUMONIA, UNSPECIFIED ORGANISM: ICD-10-CM

## 2021-03-01 DIAGNOSIS — Z29.9 ENCOUNTER FOR PROPHYLACTIC MEASURES, UNSPECIFIED: ICD-10-CM

## 2021-03-01 DIAGNOSIS — I63.9 CEREBRAL INFARCTION, UNSPECIFIED: ICD-10-CM

## 2021-03-01 LAB
ALBUMIN SERPL ELPH-MCNC: 2.4 G/DL — LOW (ref 3.5–5)
ALP SERPL-CCNC: 157 U/L — HIGH (ref 40–120)
ALT FLD-CCNC: 83 U/L DA — HIGH (ref 10–60)
ANION GAP SERPL CALC-SCNC: 8 MMOL/L — SIGNIFICANT CHANGE UP (ref 5–17)
AST SERPL-CCNC: 51 U/L — HIGH (ref 10–40)
BASOPHILS # BLD AUTO: 0.02 K/UL — SIGNIFICANT CHANGE UP (ref 0–0.2)
BASOPHILS NFR BLD AUTO: 0.2 % — SIGNIFICANT CHANGE UP (ref 0–2)
BILIRUB SERPL-MCNC: 0.7 MG/DL — SIGNIFICANT CHANGE UP (ref 0.2–1.2)
BUN SERPL-MCNC: 40 MG/DL — HIGH (ref 7–18)
CALCIUM SERPL-MCNC: 9.5 MG/DL — SIGNIFICANT CHANGE UP (ref 8.4–10.5)
CHLORIDE SERPL-SCNC: 108 MMOL/L — SIGNIFICANT CHANGE UP (ref 96–108)
CO2 SERPL-SCNC: 29 MMOL/L — SIGNIFICANT CHANGE UP (ref 22–31)
CREAT SERPL-MCNC: 1.06 MG/DL — SIGNIFICANT CHANGE UP (ref 0.5–1.3)
EOSINOPHIL # BLD AUTO: 0.31 K/UL — SIGNIFICANT CHANGE UP (ref 0–0.5)
EOSINOPHIL NFR BLD AUTO: 3.8 % — SIGNIFICANT CHANGE UP (ref 0–6)
GLUCOSE BLDC GLUCOMTR-MCNC: 101 MG/DL — HIGH (ref 70–99)
GLUCOSE BLDC GLUCOMTR-MCNC: 85 MG/DL — SIGNIFICANT CHANGE UP (ref 70–99)
GLUCOSE BLDC GLUCOMTR-MCNC: 92 MG/DL — SIGNIFICANT CHANGE UP (ref 70–99)
GLUCOSE BLDC GLUCOMTR-MCNC: 99 MG/DL — SIGNIFICANT CHANGE UP (ref 70–99)
GLUCOSE SERPL-MCNC: 99 MG/DL — SIGNIFICANT CHANGE UP (ref 70–99)
HCT VFR BLD CALC: 43.1 % — SIGNIFICANT CHANGE UP (ref 39–50)
HGB BLD-MCNC: 14.1 G/DL — SIGNIFICANT CHANGE UP (ref 13–17)
IMM GRANULOCYTES NFR BLD AUTO: 0.4 % — SIGNIFICANT CHANGE UP (ref 0–1.5)
LYMPHOCYTES # BLD AUTO: 2 K/UL — SIGNIFICANT CHANGE UP (ref 1–3.3)
LYMPHOCYTES # BLD AUTO: 24.5 % — SIGNIFICANT CHANGE UP (ref 13–44)
MAGNESIUM SERPL-MCNC: 2.6 MG/DL — SIGNIFICANT CHANGE UP (ref 1.6–2.6)
MCHC RBC-ENTMCNC: 31.9 PG — SIGNIFICANT CHANGE UP (ref 27–34)
MCHC RBC-ENTMCNC: 32.7 GM/DL — SIGNIFICANT CHANGE UP (ref 32–36)
MCV RBC AUTO: 97.5 FL — SIGNIFICANT CHANGE UP (ref 80–100)
MONOCYTES # BLD AUTO: 0.52 K/UL — SIGNIFICANT CHANGE UP (ref 0–0.9)
MONOCYTES NFR BLD AUTO: 6.4 % — SIGNIFICANT CHANGE UP (ref 2–14)
NEUTROPHILS # BLD AUTO: 5.28 K/UL — SIGNIFICANT CHANGE UP (ref 1.8–7.4)
NEUTROPHILS NFR BLD AUTO: 64.7 % — SIGNIFICANT CHANGE UP (ref 43–77)
NRBC # BLD: 0 /100 WBCS — SIGNIFICANT CHANGE UP (ref 0–0)
PHOSPHATE SERPL-MCNC: 3.4 MG/DL — SIGNIFICANT CHANGE UP (ref 2.5–4.5)
PLATELET # BLD AUTO: 230 K/UL — SIGNIFICANT CHANGE UP (ref 150–400)
POTASSIUM SERPL-MCNC: 4.1 MMOL/L — SIGNIFICANT CHANGE UP (ref 3.5–5.3)
POTASSIUM SERPL-SCNC: 4.1 MMOL/L — SIGNIFICANT CHANGE UP (ref 3.5–5.3)
PROT SERPL-MCNC: 7.3 G/DL — SIGNIFICANT CHANGE UP (ref 6–8.3)
RBC # BLD: 4.42 M/UL — SIGNIFICANT CHANGE UP (ref 4.2–5.8)
RBC # FLD: 11.9 % — SIGNIFICANT CHANGE UP (ref 10.3–14.5)
SARS-COV-2 RNA SPEC QL NAA+PROBE: SIGNIFICANT CHANGE UP
SODIUM SERPL-SCNC: 145 MMOL/L — SIGNIFICANT CHANGE UP (ref 135–145)
WBC # BLD: 8.16 K/UL — SIGNIFICANT CHANGE UP (ref 3.8–10.5)
WBC # FLD AUTO: 8.16 K/UL — SIGNIFICANT CHANGE UP (ref 3.8–10.5)

## 2021-03-01 RX ADMIN — CEFTRIAXONE 100 MILLIGRAM(S): 500 INJECTION, POWDER, FOR SOLUTION INTRAMUSCULAR; INTRAVENOUS at 17:22

## 2021-03-01 RX ADMIN — ENOXAPARIN SODIUM 120 MILLIGRAM(S): 100 INJECTION SUBCUTANEOUS at 05:37

## 2021-03-01 RX ADMIN — Medication 81 MILLIGRAM(S): at 12:35

## 2021-03-01 RX ADMIN — ATORVASTATIN CALCIUM 80 MILLIGRAM(S): 80 TABLET, FILM COATED ORAL at 21:25

## 2021-03-01 RX ADMIN — ENOXAPARIN SODIUM 120 MILLIGRAM(S): 100 INJECTION SUBCUTANEOUS at 17:22

## 2021-03-01 RX ADMIN — CHLORHEXIDINE GLUCONATE 1 APPLICATION(S): 213 SOLUTION TOPICAL at 05:37

## 2021-03-01 NOTE — PROGRESS NOTE ADULT - SUBJECTIVE AND OBJECTIVE BOX
Patient is a 75y old  Male who presents with a chief complaint of Unresponsive (28 Feb 2021 17:34)/metabolic encephalopathy/septic hsock/HCAP/intubation/extubation/URSULA/shock liver, improved, D/C planning return NH, COVID-19 swab today      INTERVAL HPI/OVERNIGHT EVENTS:  T(C): 36.7 (03-01-21 @ 05:00), Max: 37.3 (02-28-21 @ 21:04)  HR: 98 (03-01-21 @ 08:28) (96 - 115)  BP: 144/86 (03-01-21 @ 05:00) (108/71 - 145/85)  RR: 20 (03-01-21 @ 05:00) (20 - 20)  SpO2: 95% (03-01-21 @ 08:28) (94% - 98%)  Wt(kg): --    LABS:                        14.1   8.16  )-----------( 230      ( 01 Mar 2021 08:25 )             43.1     03-01    145  |  108  |  40<H>  ----------------------------<  99  4.1   |  29  |  1.06    Ca    9.5      01 Mar 2021 08:25  Phos  3.4     03-01  Mg     2.6     03-01    TPro  7.3  /  Alb  2.4<L>  /  TBili  0.7  /  DBili  x   /  AST  51<H>  /  ALT  83<H>  /  AlkPhos  157<H>  03-01        CAPILLARY BLOOD GLUCOSE      POCT Blood Glucose.: 99 mg/dL (01 Mar 2021 08:12)  POCT Blood Glucose.: 114 mg/dL (28 Feb 2021 22:36)  POCT Blood Glucose.: 96 mg/dL (28 Feb 2021 16:59)  POCT Blood Glucose.: 97 mg/dL (28 Feb 2021 11:51)        RADIOLOGY & ADDITIONAL TESTS:    Consultant(s) Notes Reviewed:  [x ] YES  [ ] NO    PHYSICAL EXAM:  GENERAL: well built, well nourished  HEAD:  Atraumatic, Normocephalic  EYES: EOMI, PERRLA, conjunctiva and sclera clear  ENT: No tonsillar erythema, exudates, or enlargement; Moist mucous membranes, Good dentition, No lesions  NECK: Supple, No JVD, Normal thyroid, no enlarged nodes  NERVOUS SYSTEM:  Alert & Oriented X3, Good concentration; Motor Strength 5/5 B/L upper and lower extremities; DTRs 2+ intact and symmetric, sensory intact  CHEST/LUNG: B/L good air entry; No rales, rhonchi, or wheezing  HEART: S1S2 normal, no S3, Regular rate and rhythm; No murmurs, rubs, or gallops  ABDOMEN: Soft, Nontender, Nondistended; Bowel sounds present  EXTREMITIES:  2+ Peripheral Pulses, No clubbing, cyanosis,  edema improved  LYMPH: No lymphadenopathy noted  SKIN: No rashes or lesions    Care Discussed with Consultants/Other Providers [ x] YES  [ ] NO

## 2021-03-01 NOTE — PROGRESS NOTE ADULT - PROBLEM SELECTOR PLAN 7
likely due to septic shock   down trending LFTs  normal live sonogram  monitor LFTs  avoids hepatotoxic meds off anti blood pressure meds during this admission  on midodrine 5mg TID   continue to monitor BP and adjust meds as needed

## 2021-03-01 NOTE — PROGRESS NOTE ADULT - PROBLEM SELECTOR PLAN 10
unable to reach out to wife and son today -- called both of them x 4 times and left VM x 1 each   likely d/c to [facility] tomorrow   pending re-eval form SLP and PT   COVID negative 2/23  f/u today COVID

## 2021-03-01 NOTE — PROGRESS NOTE ADULT - PROBLEM SELECTOR PLAN 5
likely due to recent stroke  seen by SLP - megan/ odilon thick  re-evaluation requested   continue aspiration precaution

## 2021-03-01 NOTE — CONSULT NOTE ADULT - CONSULT REASON
resp failure
Bacteremia/ UTI
Cardiac Arrest
76 y/o M with acute respiratory failure/septic shock 2/2 Aspiration PNA/bacteremia. LACE 9. Request to identify surrogate.

## 2021-03-01 NOTE — PROGRESS NOTE ADULT - SUBJECTIVE AND OBJECTIVE BOX
NP Note discussed with  Primary Attending    Patient is a 75y old  Male who presents with a chief complaint of Unresponsive (01 Mar 2021 10:54)      HPI - Patient is a 75y old  Male hx of recent CVA which 3 weeks ago, found to be unresponsiveness from NH. FOund to have metabolic encephalopathy 2/2  Uro sepsis. Found to have LLL infiltration due to  HCAP pt was intubated eu to hypoxia and extubated on 2/26/2021, now maintains O2  saturation well on RA.  URSULA on CKD improving, shock liver , LFTs downtrending. D/C planning return NH, COVID-19 swab today      OGC_ full code     INTERVAL HPI/OVERNIGHT EVENTS: no new complaints    MEDICATIONS  (STANDING):  aspirin  chewable 81 milliGRAM(s) Oral daily  atorvastatin 80 milliGRAM(s) Oral at bedtime  cefTRIAXone   IVPB      cefTRIAXone   IVPB 1000 milliGRAM(s) IV Intermittent every 24 hours  chlorhexidine 2% Cloths 1 Application(s) Topical <User Schedule>  dextrose 50% Injectable 25 Gram(s) IV Push once  enoxaparin Injectable 120 milliGRAM(s) SubCutaneous two times a day  insulin lispro (ADMELOG) corrective regimen sliding scale   SubCutaneous Before meals and at bedtime  midodrine 5 milliGRAM(s) Oral every 8 hours    MEDICATIONS  (PRN):  acetaminophen    Suspension .. 650 milliGRAM(s) Oral every 6 hours PRN Temp greater or equal to 38C (100.4F), Mild Pain (1 - 3)  guaiFENesin   Syrup  (Sugar-Free) 100 milliGRAM(s) Oral every 4 hours PRN Cough      __________________________________________________  REVIEW OF SYSTEMS:    unable to obtain ROS due to mental status     Vital Signs Last 24 Hrs  T(C): 36.4 (01 Mar 2021 12:13), Max: 37.3 (28 Feb 2021 21:04)  T(F): 97.6 (01 Mar 2021 12:13), Max: 99.2 (28 Feb 2021 21:04)  HR: 103 (01 Mar 2021 12:13) (98 - 115)  BP: 152/89 (01 Mar 2021 12:13) (108/71 - 152/89)  BP(mean): --  RR: 20 (01 Mar 2021 12:13) (20 - 20)  SpO2: 96% (01 Mar 2021 12:13) (94% - 98%)    ________________________________________________  PHYSICAL EXAM:  GENERAL: NAD  HEENT: Normocephalic;  conjunctivae and sclerae clear; moist mucous membranes;   NECK : supple  CHEST/LUNG: Clear to auscultation bilaterally with good air entry   HEART: S1 S2  regular; no murmurs, gallops or rubs  ABDOMEN: Soft, Nontender, Nondistended; Bowel sounds present  EXTREMITIES: no cyanosis; no edema; no calf tenderness, Right sided weakness   SKIN: warm and dry; no rash  NERVOUS SYSTEM:  Awake and alert; Oriented x himself ; no new deficits    _________________________________________________  LABS:                        14.1   8.16  )-----------( 230      ( 01 Mar 2021 08:25 )             43.1     03-01    145  |  108  |  40<H>  ----------------------------<  99  4.1   |  29  |  1.06    Ca    9.5      01 Mar 2021 08:25  Phos  3.4     03-01  Mg     2.6     03-01    TPro  7.3  /  Alb  2.4<L>  /  TBili  0.7  /  DBili  x   /  AST  51<H>  /  ALT  83<H>  /  AlkPhos  157<H>  03-01        CAPILLARY BLOOD GLUCOSE      POCT Blood Glucose.: 101 mg/dL (01 Mar 2021 12:00)  POCT Blood Glucose.: 99 mg/dL (01 Mar 2021 08:12)  POCT Blood Glucose.: 114 mg/dL (28 Feb 2021 22:36)  POCT Blood Glucose.: 96 mg/dL (28 Feb 2021 16:59)        RADIOLOGY & ADDITIONAL TESTS:  < from: Xray Chest 1 View- PORTABLE-Routine (Xray Chest 1 View- PORTABLE-Routine in AM.) (02.26.21 @ 09:18) >    EXAM:  XR CHEST PORTABLE ROUTINE 1V                            PROCEDURE DATE:  02/26/2021          INTERPRETATION:  CLINICAL STATEMENT: Follow-up chest pain.    TECHNIQUE: AP view of the chest.    COMPARISON: 2/25/2021    FINDINGS/  IMPRESSION:  ET tube, feeding tube, left central line again noted. No pneumothorax.    Improving left pleural effusion. Better aeration right lung base.    Heart size cannot be accurately assessed in this projection.                VINH THOMAS MD; Attending Radiologist  This document has been electronically signed. Feb 26 2021 12:45PM    < end of copied text >  < from: US Hepatic & Pancreatic (02.21.21 @ 21:44) >    EXAM:  US LIVER AND PANCREAS                            PROCEDURE DATE:  02/21/2021          INTERPRETATION:  CLINICAL INFORMATION: Elevated transaminitis.    TECHNIQUE: Sonographic images of the abdomen was performed.    COMPARISON: CT of the abdomen and pelvis dated 2/19/2021    FINDINGS:    Evaluation is limited due to overlying bowel gas.    LIVER: Within normal limits.  GALLBLADDER: Not visualized.  CBD: Not visualized.  PANCREAS: Evaluation of the pancreas is limited secondary to overlying bowel gas.  RIGHT KIDNEY: Evaluation of the right kidney is limited secondary to overlying bowel gas.  AORTA AND IVC: Visualized portions of the IVC and aorta are unremarkable.    IMPRESSION: Limited study. Nonvisualization of the gallbladder.            GAGAN SHEPHERD MD; Attending Radiologist  This document has been electronically signed. Feb 22 2021  1:09AM    < end of copied text >  < from: CT Chest No Cont (02.19.21 @ 02:44) >  EXAM:  CT ABDOMEN AND PELVIS                          EXAM:  CT CHEST                            PROCEDURE DATE:  02/19/2021          INTERPRETATION:  CLINICAL INFORMATION:  Fever.    COMPARISON: None.    PROCEDURE:  CT of the Chest, Abdomen and Pelvis was performed without intravenous contrast.  Intravenous contrast: None.  Oral contrast: None.  Sagittal and coronal reformats were performed.  Postprocessed chest MIP reformatted images were created and reviewed.    FINDINGS:    CHEST:    LINES AND TUBES: Endotracheal tube and endogastric tube appear in place.  LUNGS AND LARGE AIRWAYS: Patent central airways. Right middle lobe as well as patchy bibasilar opacities. Findings nonspecific, likely represent developing pneumonia or aspiration.  PLEURA: No pleural effusion.  VESSELS: Atherosclerotic disease of the aorta and its branches.  HEART: Trace pericardial effusion and/or thickening with mild cardiomegaly. Coronary artery calcifications.  MEDIASTINUM AND JESSY: No lymphadenopathy.  CHESTWALL AND LOWER NECK: Bilateral gynecomastia.    ABDOMEN AND PELVIS:    LIVER: Enlarged measuring up to 20 cm in length.  BILE DUCTS: No significant dilatation.  GALLBLADDER: Cholecystectomy.  SPLEEN: Within normal limits.  PANCREAS: Within normal limits.  ADRENALS: Within normal limits.  KIDNEYS/URETERS: No hydronephrosis. Punctate nonobstructive stone in the upper pole of left kidney. Trace nonspecific bilateral perinephric stranding. 1.6 cm right renal cyst of higher attenuation than simple fluid. Consider nonemergent correlation with renal ultrasound.    BLADDER: Decompressed containing Garcia catheter.  REPRODUCTIVE ORGANS: Prominent prostate measuring up to 5.6 cm in transverse length.    BOWEL: No bowel obstruction. Colonic diverticulosis without acute diverticulitis. Appendix is not visualized without secondary findings of acute appendicitis.  PERITONEUM: No ascites.  VESSELS:  Atherosclerotic disease of the aorta and its branches.  RETROPERITONEUM: No lymphadenopathy.  ABDOMINAL WALL: A small fat containing umbilical and bilateral groin hernia is noted. Right groin femoral catheter identified.  BONES: Multilevel degenerative changes and scoliosis. Indeterminate sclerotic focus in T4 vertebral body.    IMPRESSION:    Right middle lobe as well as patchy bibasilar opacities. Findings nonspecific, likely represent developing pneumonia or aspiration.    No acute bowel inflammatory changes or obstruction.    No hydronephrosis. Punctate nonobstructive stone in the upper pole of left kidney. Trace nonspecific bilateral perinephric stranding.    1.6 cm right renal cyst of higher attenuation than simple fluid. Consider nonemergent correlation with renal ultrasound.    Additional findings as mentioned above.              DEWAYNE RAYMUNDO MD; Attending Radiologist  This document has been electronically signed. Feb 19 2021  3:07AM    < end of copied text >    Imaging Personally Reviewed:  YES    Consultant(s) Notes Reviewed:   YES    Care Discussed with Consultants : ID/ pulmonology/ palliative     Plan of care was discussed with patient and /or primary care giver; all questions and concerns were addressed and care was aligned with patient's wishes.

## 2021-03-01 NOTE — PROGRESS NOTE ADULT - ASSESSMENT
75 [facility]/O CVA recent/dementia/HTN/BPH, admit hospital for unresponsive, intubated at ER, CT chest/ABD showed PNA/aspiration(?)/blood culture positive, start pressor for septic shock//IV ABS, F/U pan culture result, ICU care, close monitor lab, titrate pressor, elevated LFT, shock liver, close monitor LFT  stable, S/P extubation, tolerated po diet, PT eval, D/C planning, return to [facility], COVID-19 swab today. Fall precaution

## 2021-03-01 NOTE — PROGRESS NOTE ADULT - PROBLEM SELECTOR PLAN 9
unable to reach out to wife and son today -- called both of them x 4 times and left VM x 1 each   likely d/c to [facility] tomorrow   pending re-eval form SLP and PT   COVID negative 2/23  f/u today COVID on lovenox 120mg BID for recent stroke  pt was on xarelto 20mg QPM in NH- plz verify this with Dr. Daniels before d/c   continue PPI for GI ppx

## 2021-03-01 NOTE — PROGRESS NOTE ADULT - PROBLEM SELECTOR PLAN 8
on lovenox 120mg BID for recent stroke  pt was on xarelto 20mg QPM in NH- plz verify this with Dr. Daniels before d/c   continue PPI for GI ppx likely due to septic shock   down trending LFTs  normal live sonogram  monitor LFTs  avoids hepatotoxic meds

## 2021-03-01 NOTE — PROGRESS NOTE ADULT - PROBLEM SELECTOR PLAN 1
found unresponsive in NH  required intubation -- s/p extubation on 2/26  currently on RA, continue BIPAP at night  likely du to HCAP  on ceftriaxone till 3/3 8pm   ID Dr. Sosa is following

## 2021-03-01 NOTE — CONSULT NOTE ADULT - SUBJECTIVE AND OBJECTIVE BOX
Time of visit:    CHIEF COMPLAINT: Patient is a 75y old  Male who presents with a chief complaint of Unresponsive (01 Mar 2021 14:38)      HPI:  75 [facility], non ambulatory with PMhx of CVA( twice first in July 2020, second 3 weeks ago), HTN, HLD, Prediabetes,BPH was brought in from NH for unresponsiveness. As per NH papers, pt was lethargic today and then become unresponsive so EMS was called. As per wife Polish speaking(  [id number]) Pt was confused and had memory loss after the stroke 3 weeks ago when he was admitted to [facility] and then discharged to Rehab on 2/8/21. Pt was walking with walker after the first stroke and not much ambulatory after the second stroke. No other history available.     ED course: Pt was unresponsive so was intubated in The ED and Femoral line was placed by the ED physician   Vitals : Febrile to 103 F , , /108  Labs : wbc 6k ,lactate 1.2, Bun/CR 40/1.56  T1 0.018  ECG : NSR   CXR : clear     SH : non smoker , drinks wine ocassionally , no drug use     GOC : FULL CODE  (18 Feb 2021 22:53)   Patient seen and examined.     PAST MEDICAL & SURGICAL HISTORY:  BPH (benign prostatic hyperplasia)    Hyperlipidemia    Hypertension    Diabetes    Stroke  7/2020 , 1/2021    S/P cholecystectomy        Allergies    No Known Allergies    Intolerances        MEDICATIONS  (STANDING):  aspirin  chewable 81 milliGRAM(s) Oral daily  atorvastatin 80 milliGRAM(s) Oral at bedtime  cefTRIAXone   IVPB      cefTRIAXone   IVPB 1000 milliGRAM(s) IV Intermittent every 24 hours  chlorhexidine 2% Cloths 1 Application(s) Topical <User Schedule>  dextrose 50% Injectable 25 Gram(s) IV Push once  enoxaparin Injectable 120 milliGRAM(s) SubCutaneous two times a day  insulin lispro (ADMELOG) corrective regimen sliding scale   SubCutaneous Before meals and at bedtime  midodrine 5 milliGRAM(s) Oral every 8 hours      MEDICATIONS  (PRN):  acetaminophen    Suspension .. 650 milliGRAM(s) Oral every 6 hours PRN Temp greater or equal to 38C (100.4F), Mild Pain (1 - 3)  guaiFENesin   Syrup  (Sugar-Free) 100 milliGRAM(s) Oral every 4 hours PRN Cough   Medications up to date at time of exam.    Medications up to date at time of exam.    FAMILY HISTORY:      SOCIAL HISTORY  Smoking History: [   ] smoking/smoke exposure, [   ] former smoker  Living Condition: [   ] apartment, [   ] private house  Work History:   Travel History: denies recent travel  Illicit Substance Use: denies  Alcohol Use: denies    REVIEW OF SYSTEMS:    CONSTITUTIONAL:  denies fevers, chills, sweats, weight loss    HEENT:  denies diplopia or blurred vision, sore throat or runny nose.    CARDIOVASCULAR:  denies pressure, squeezing, tightness, or heaviness about the chest; no palpitations.    RESPIRATORY:  denies SOB, cough, MARRERO, wheezing.    GASTROINTESTINAL:  denies abdominal pain, nausea, vomiting or diarrhea.    GENITOURINARY: denies dysuria, frequency or urgency.    NEUROLOGIC:  denies numbness, tingling, seizures or weakness.    PSYCHIATRIC:  denies disorder of thought or mood.    MSK: denies swelling, redness      PHYSICAL EXAMINATION:    GENERAL: The patient is a well-developed, well-nourished, in no apparent distress.     Vital Signs Last 24 Hrs  T(C): 36.4 (01 Mar 2021 12:13), Max: 37.3 (28 Feb 2021 21:04)  T(F): 97.6 (01 Mar 2021 12:13), Max: 99.2 (28 Feb 2021 21:04)  HR: 103 (01 Mar 2021 16:19) (98 - 115)  BP: 152/89 (01 Mar 2021 12:13) (108/71 - 152/89)  BP(mean): --  RR: 20 (01 Mar 2021 12:13) (20 - 20)  SpO2: 94% (01 Mar 2021 16:19) (94% - 98%)   (if applicable)    Chest Tube (if applicable)    HEENT: Head is normocephalic and atraumatic. Extraocular muscles are intact. Mucous membranes are moist.     NECK: Supple, no palpable adenopathy.    LUNGS: Clear to auscultation, no wheezing, rales, or rhonchi.    HEART: Regular rate and rhythm without murmur.    ABDOMEN: Soft, nontender, and nondistended.  No hepatosplenomegaly is noted.    RENAL: No difficulty voiding, no pelvic pain    EXTREMITIES: Without any cyanosis, clubbing, rash, lesions or edema.    NEUROLOGIC: Awake, alert, oriented, grossly intact    SKIN: Warm, dry, good turgor.      LABS:                        14.1   8.16  )-----------( 230      ( 01 Mar 2021 08:25 )             43.1     03-01    145  |  108  |  40<H>  ----------------------------<  99  4.1   |  29  |  1.06    Ca    9.5      01 Mar 2021 08:25  Phos  3.4     03-01  Mg     2.6     03-01    TPro  7.3  /  Alb  2.4<L>  /  TBili  0.7  /  DBili  x   /  AST  51<H>  /  ALT  83<H>  /  AlkPhos  157<H>  03-01                        MICROBIOLOGY: (if applicable)    RADIOLOGY & ADDITIONAL STUDIES:  EKG:   CXR:< from: Xray Chest 1 View- PORTABLE-Routine (Xray Chest 1 View- PORTABLE-Routine in AM.) (02.26.21 @ 09:18) >    EXAM:  XR CHEST PORTABLE ROUTINE 1V                            PROCEDURE DATE:  02/26/2021          INTERPRETATION:  CLINICAL STATEMENT: Follow-up chest pain.    TECHNIQUE: AP view of the chest.    COMPARISON: 2/25/2021    FINDINGS/  IMPRESSION:  ET tube, feeding tube, left central line again noted. No pneumothorax.    Improving left pleural effusion. Better aeration right lung base.    Heart size cannot be accurately assessed in this projection.                VINH THOMAS MD; Attending Radiologist  This document has been electronically signed. Feb 26 2021 12:45PM    < end of copied text >    ECHO:    IMPRESSION: 75y Male PAST MEDICAL & SURGICAL HISTORY:  BPH (benign prostatic hyperplasia)    Hyperlipidemia    Hypertension    Diabetes    Stroke  7/2020 , 1/2021    S/P cholecystectomy     p/w             75 Y M from [facility] rehab, non ambulatory with PMhx of CVA( twice first in July 2020, second 3 weeks ago), HTN, HLD, Prediabetes, BPH was brought in from NH for unresponsiveness. Patient was recently discharged to rehab after he was found to have stroke. Pt was unresponsive so was intubated in The ED and Femoral line was placed by the ED physician. Patient was admitted to ICU for hypoxic respiratory failure and septic shock 2/2 UTI . He was spiking high grade fever, lactate and CXR were normal. Blood and urine cultures were sent in ED followed by Vancomycin and zosyn empiric dosing..   Urinalysis was positive. Patient was start on Pressor support and IV Meropenem  for broad spectrum coverage. Blood cultures were growing Enterococci.. Later antibiotics were de-escalated to Rocephin as urine cultures were growing pan sensitive Klebsiella.  Repeat cultures came back negative  Patient's pressor support progressively decreased, after a few trials of SBT, Patient was extubated on 2/26 after a successful SBT. He is was started on Midodrine 5mg Q8 to wean off the pressors. He had a jackson catheter placed on admission.   Patient is back to his baseline mental status as per the wife, AAOx0-1 due to multiple CVAs and dementia, also as per outpatient PCP. he can not follow all the commands, likely has a language barrier as well.  Due to concerns for worsening increasing tachypnea after intubation, patient was placed on nocturnal BIPAP on 2/26 for hypoxia and as a precaution since he was recently extubated.            Assessment and Plan:    Problem/Plan - 1:  ·  Problem: Acute respiratory failure with hypoxia.  Plan: found unresponsive in NH  required intubation -- s/p extubation on 2/26  currently on RA, continue BIPAP at night  likely du to HCAP  on ceftriaxone till 3/3 8pm   will need BiPaP at night in NH     Problem/Plan - 2:  ·  Problem: HCAP (healthcare-associated pneumonia).  Plan. continue antibx as per ID     Problem/Plan - 3:  ·  Problem: Sepsis due to Klebsiella.  Plan: Urine culture grew klebsiella  resolved sepsis  continue ceftriaxone till 3/3 8pm last dose.

## 2021-03-01 NOTE — PROGRESS NOTE ADULT - PROBLEM SELECTOR PLAN 4
2nd stroke in 3 weeks ago  Rt sided weakness  + dysphagia  was on xarelto 20mg QPM in NH - re- discuss with Dr. Daniels for d/c

## 2021-03-02 LAB
ALBUMIN SERPL ELPH-MCNC: 2.2 G/DL — LOW (ref 3.5–5)
ALP SERPL-CCNC: 138 U/L — HIGH (ref 40–120)
ALT FLD-CCNC: 72 U/L DA — HIGH (ref 10–60)
ANION GAP SERPL CALC-SCNC: 8 MMOL/L — SIGNIFICANT CHANGE UP (ref 5–17)
AST SERPL-CCNC: 57 U/L — HIGH (ref 10–40)
BILIRUB SERPL-MCNC: 0.7 MG/DL — SIGNIFICANT CHANGE UP (ref 0.2–1.2)
BUN SERPL-MCNC: 35 MG/DL — HIGH (ref 7–18)
CALCIUM SERPL-MCNC: 8.9 MG/DL — SIGNIFICANT CHANGE UP (ref 8.4–10.5)
CHLORIDE SERPL-SCNC: 112 MMOL/L — HIGH (ref 96–108)
CO2 SERPL-SCNC: 24 MMOL/L — SIGNIFICANT CHANGE UP (ref 22–31)
CREAT SERPL-MCNC: 0.93 MG/DL — SIGNIFICANT CHANGE UP (ref 0.5–1.3)
GLUCOSE BLDC GLUCOMTR-MCNC: 122 MG/DL — HIGH (ref 70–99)
GLUCOSE BLDC GLUCOMTR-MCNC: 134 MG/DL — HIGH (ref 70–99)
GLUCOSE BLDC GLUCOMTR-MCNC: 80 MG/DL — SIGNIFICANT CHANGE UP (ref 70–99)
GLUCOSE BLDC GLUCOMTR-MCNC: 95 MG/DL — SIGNIFICANT CHANGE UP (ref 70–99)
GLUCOSE SERPL-MCNC: 84 MG/DL — SIGNIFICANT CHANGE UP (ref 70–99)
HCT VFR BLD CALC: 45.1 % — SIGNIFICANT CHANGE UP (ref 39–50)
HGB BLD-MCNC: 14.4 G/DL — SIGNIFICANT CHANGE UP (ref 13–17)
MCHC RBC-ENTMCNC: 31.5 PG — SIGNIFICANT CHANGE UP (ref 27–34)
MCHC RBC-ENTMCNC: 31.9 GM/DL — LOW (ref 32–36)
MCV RBC AUTO: 98.7 FL — SIGNIFICANT CHANGE UP (ref 80–100)
NRBC # BLD: 0 /100 WBCS — SIGNIFICANT CHANGE UP (ref 0–0)
PLATELET # BLD AUTO: 166 K/UL — SIGNIFICANT CHANGE UP (ref 150–400)
POTASSIUM SERPL-MCNC: 5 MMOL/L — SIGNIFICANT CHANGE UP (ref 3.5–5.3)
POTASSIUM SERPL-SCNC: 5 MMOL/L — SIGNIFICANT CHANGE UP (ref 3.5–5.3)
PROT SERPL-MCNC: 6.9 G/DL — SIGNIFICANT CHANGE UP (ref 6–8.3)
RBC # BLD: 4.57 M/UL — SIGNIFICANT CHANGE UP (ref 4.2–5.8)
RBC # FLD: 11.6 % — SIGNIFICANT CHANGE UP (ref 10.3–14.5)
SODIUM SERPL-SCNC: 144 MMOL/L — SIGNIFICANT CHANGE UP (ref 135–145)
WBC # BLD: 5.85 K/UL — SIGNIFICANT CHANGE UP (ref 3.8–10.5)
WBC # FLD AUTO: 5.85 K/UL — SIGNIFICANT CHANGE UP (ref 3.8–10.5)

## 2021-03-02 RX ADMIN — Medication 81 MILLIGRAM(S): at 12:19

## 2021-03-02 RX ADMIN — Medication 1 DROP(S): at 18:18

## 2021-03-02 RX ADMIN — MIDODRINE HYDROCHLORIDE 5 MILLIGRAM(S): 2.5 TABLET ORAL at 12:19

## 2021-03-02 RX ADMIN — ENOXAPARIN SODIUM 120 MILLIGRAM(S): 100 INJECTION SUBCUTANEOUS at 18:18

## 2021-03-02 RX ADMIN — ENOXAPARIN SODIUM 120 MILLIGRAM(S): 100 INJECTION SUBCUTANEOUS at 05:22

## 2021-03-02 RX ADMIN — Medication 1 DROP(S): at 12:41

## 2021-03-02 RX ADMIN — CEFTRIAXONE 100 MILLIGRAM(S): 500 INJECTION, POWDER, FOR SOLUTION INTRAMUSCULAR; INTRAVENOUS at 12:19

## 2021-03-02 RX ADMIN — CHLORHEXIDINE GLUCONATE 1 APPLICATION(S): 213 SOLUTION TOPICAL at 05:22

## 2021-03-02 NOTE — PROGRESS NOTE ADULT - ASSESSMENT
75 yr old male fom NH, H/O CVA recent/dementia/HTN/BPH, admit hospital for unresponsive, intubated at ER, CT chest/ABD showed PNA/aspiration(?)/blood culture positive, start pressor for septic shock//IV ABS, F/U pan culture result, ICU care, close monitor lab, titrate pressor, elevated LFT, shock liver, close monitor LFT  stable, S/P extubation, tolerated po diet, PT eval, D/C planning, return to [facility], if family agreed, COVID-19 swab negative 3/1. Fall precaution.  follow up for safe discharge planning

## 2021-03-02 NOTE — PROGRESS NOTE ADULT - PROBLEM SELECTOR PLAN 7
off anti blood pressure meds during this admission  on midodrine 5mg TID   continue to monitor BP and adjust meds as needed

## 2021-03-02 NOTE — PROGRESS NOTE ADULT - PROBLEM SELECTOR PLAN 10
unable to reach out to wife and son today -- called both of them x 4 times and left VM x 1 each   likely d/c to [facility] tomorrow  PT recommendations   COVID negative 2/23, 3/1     follow up for safe discharge planning unable to reach out to wife and son today -- called both of them x 4 times and left VM x 1 each   family wants patient home VS [facility] [place] TBD   PT recommendations   COVID negative 2/23, 3/1     follow up for safe discharge planning

## 2021-03-02 NOTE — PROGRESS NOTE ADULT - PROBLEM SELECTOR PLAN 8
likely due to septic shock   down trending LFTs  normal live sonogram  monitor LFTs  avoids hepatotoxic meds

## 2021-03-02 NOTE — PROGRESS NOTE ADULT - PROBLEM SELECTOR PLAN 3
Urine culture grew klebsiella  resolved sepsis  continue ceftriaxone till 3/3 8pm last dose Urine culture grew klebsiella  resolved sepsis  continue ceftriaxone till 3/3 8pm last dose- completed

## 2021-03-02 NOTE — PROGRESS NOTE ADULT - SUBJECTIVE AND OBJECTIVE BOX
Patient is a 75y old  Male who presents with a chief complaint of Unresponsive (01 Mar 2021 18:01)/metabolic encephalopathy/septic shock/intubation/extubation/URSULA/shock liver, stable, D/C planning return to [facility] if family agreed. Shift to [facility] prior discharge      INTERVAL HPI/OVERNIGHT EVENTS:  T(C): 37.2 (03-02-21 @ 05:13), Max: 37.3 (03-01-21 @ 20:23)  HR: 97 (03-02-21 @ 08:29) (97 - 106)  BP: 149/79 (03-02-21 @ 05:13) (135/86 - 152/89)  RR: 20 (03-02-21 @ 05:13) (19 - 20)  SpO2: 100% (03-02-21 @ 08:29) (94% - 100%)  Wt(kg): --    LABS:                        14.4   5.85  )-----------( 166      ( 02 Mar 2021 09:27 )             45.1     03-02    144  |  112<H>  |  35<H>  ----------------------------<  84  5.0   |  24  |  0.93    Ca    8.9      02 Mar 2021 09:27  Phos  3.4     03-01  Mg     2.6     03-01    TPro  6.9  /  Alb  2.2<L>  /  TBili  0.7  /  DBili  x   /  AST  57<H>  /  ALT  72<H>  /  AlkPhos  138<H>  03-02        CAPILLARY BLOOD GLUCOSE      POCT Blood Glucose.: 95 mg/dL (02 Mar 2021 08:01)  POCT Blood Glucose.: 92 mg/dL (01 Mar 2021 20:41)  POCT Blood Glucose.: 85 mg/dL (01 Mar 2021 16:47)  POCT Blood Glucose.: 101 mg/dL (01 Mar 2021 12:00)        RADIOLOGY & ADDITIONAL TESTS:    Consultant(s) Notes Reviewed:  [x ] YES  [ ] NO    PHYSICAL EXAM:  GENERAL: well built, well nourished  HEAD:  Atraumatic, Normocephalic  EYES: EOMI, PERRLA, conjunctiva and sclera clear  ENT: No tonsillar erythema, exudates, or enlargement; Moist mucous membranes, Good dentition, No lesions  NECK: Supple, No JVD, Normal thyroid, no enlarged nodes  NERVOUS SYSTEM:  Alert & Oriented X3, Good concentration; Motor Strength 5/5 B/L upper and lower extremities; DTRs 2+ intact and symmetric, sensory intact  CHEST/LUNG: B/L good air entry; No rales, rhonchi, or wheezing  HEART: S1S2 normal, no S3, Regular rate and rhythm; No murmurs, rubs, or gallops  ABDOMEN: Soft, Nontender, Nondistended; Bowel sounds present  EXTREMITIES:  2+ Peripheral Pulses, No clubbing, cyanosis,  edema some improvement  LYMPH: No lymphadenopathy noted  SKIN: No rashes or lesions    Care Discussed with Consultants/Other Providers [ x] YES  [ ] NO

## 2021-03-02 NOTE — PROGRESS NOTE ADULT - PROBLEM SELECTOR PLAN 9
on lovenox 120mg BID for recent stroke  pt was on xarelto 20mg QPM in NH- plz verify this with Dr. Daniels before d/c   continue PPI for GI ppx on lovenox 120mg BID for recent stroke  pt was on xarelto 20mg QPM in NH- may change back to xarelto on discharge per Dr. Daniels   continue PPI for GI ppx.

## 2021-03-02 NOTE — PROGRESS NOTE ADULT - SUBJECTIVE AND OBJECTIVE BOX
NP Note discussed with  Primary Attending    Patient is a 75y old  Male who presents with a chief complaint of Unresponsive (02 Mar 2021 11:12)      INTERVAL HPI/OVERNIGHT EVENTS: no new complaints    MEDICATIONS  (STANDING):  aspirin  chewable 81 milliGRAM(s) Oral daily  atorvastatin 80 milliGRAM(s) Oral at bedtime  chlorhexidine 2% Cloths 1 Application(s) Topical <User Schedule>  dextrose 50% Injectable 25 Gram(s) IV Push once  enoxaparin Injectable 120 milliGRAM(s) SubCutaneous two times a day  insulin lispro (ADMELOG) corrective regimen sliding scale   SubCutaneous Before meals and at bedtime  midodrine 5 milliGRAM(s) Oral every 8 hours    MEDICATIONS  (PRN):  acetaminophen    Suspension .. 650 milliGRAM(s) Oral every 6 hours PRN Temp greater or equal to 38C (100.4F), Mild Pain (1 - 3)  artificial tears (preservative free) Ophthalmic Solution 1 Drop(s) Both EYES two times a day PRN Dry Eyes  guaiFENesin   Syrup  (Sugar-Free) 100 milliGRAM(s) Oral every 4 hours PRN Cough      __________________________________________________  REVIEW OF SYSTEMS:    CONSTITUTIONAL: No fever,   EYES: no acute visual disturbances  NECK: No pain or stiffness  RESPIRATORY: No cough; No shortness of breath  CARDIOVASCULAR: No chest pain, no palpitations  GASTROINTESTINAL: No pain. No nausea or vomiting; No diarrhea   NEUROLOGICAL: No headache or numbness, no tremors  MUSCULOSKELETAL: No joint pain, no muscle pain  GENITOURINARY: no dysuria, no frequency, no hesitancy  PSYCHIATRY: no depression , no anxiety  ALL OTHER  ROS negative        Vital Signs Last 24 Hrs  T(C): 36.8 (02 Mar 2021 11:52), Max: 37.3 (01 Mar 2021 20:23)  T(F): 98.2 (02 Mar 2021 11:52), Max: 99.1 (01 Mar 2021 20:23)  HR: 116 (02 Mar 2021 12:05) (97 - 116)  BP: 129/72 (02 Mar 2021 11:52) (129/72 - 149/79)  BP(mean): --  RR: 18 (02 Mar 2021 11:52) (18 - 20)  SpO2: 96% (02 Mar 2021 12:05) (94% - 100%)    ________________________________________________  PHYSICAL EXAM:  GENERAL: NAD  HEENT: Normocephalic;  conjunctivae and sclerae clear; moist mucous membranes;   NECK : supple  CHEST/LUNG: Clear to auscultation bilaterally with good air entry   HEART: S1 S2  regular; no murmurs, gallops or rubs  ABDOMEN: Soft, Nontender, Nondistended; Bowel sounds present  EXTREMITIES: no cyanosis; no edema; no calf tenderness right sided weakness   SKIN: warm and dry; no rash  NERVOUS SYSTEM:  Awake and alert; Oriented  to place, person and time ; no new deficits    _________________________________________________  LABS:                        14.4   5.85  )-----------( 166      ( 02 Mar 2021 09:27 )             45.1     03-02    144  |  112<H>  |  35<H>  ----------------------------<  84  5.0   |  24  |  0.93    Ca    8.9      02 Mar 2021 09:27  Phos  3.4     03-01  Mg     2.6     03-01    TPro  6.9  /  Alb  2.2<L>  /  TBili  0.7  /  DBili  x   /  AST  57<H>  /  ALT  72<H>  /  AlkPhos  138<H>  03-02        CAPILLARY BLOOD GLUCOSE      POCT Blood Glucose.: 134 mg/dL (02 Mar 2021 11:20)  POCT Blood Glucose.: 95 mg/dL (02 Mar 2021 08:01)  POCT Blood Glucose.: 92 mg/dL (01 Mar 2021 20:41)  POCT Blood Glucose.: 85 mg/dL (01 Mar 2021 16:47)        RADIOLOGY & ADDITIONAL TESTS:    Imaging Personally Reviewed:  YES/NO    Consultant(s) Notes Reviewed:   YES/ No    Care Discussed with Consultants :     Plan of care was discussed with patient and /or primary care giver; all questions and concerns were addressed and care was aligned with patient's wishes.

## 2021-03-02 NOTE — PROGRESS NOTE ADULT - SUBJECTIVE AND OBJECTIVE BOX
75y Male is under our care for aspiration pneumonia with respiratory failure, bacteremia, and ?UTI. Patient was downgraded to the floor over the weekend. Patient is doing much better. Remains afebrile with normal wbc count. Wife would like to see patient before she can make decision on nh vs home.     REVIEW OF SYSTEMS:  [ X ] Not able to illicit    MEDS: no antibiotics     ALLERGIES: Allergies    No Known Allergies    Intolerances      VITALS:  Vital Signs Last 24 Hrs  T(C): 36.8 (02 Mar 2021 11:52), Max: 37.3 (01 Mar 2021 20:23)  T(F): 98.2 (02 Mar 2021 11:52), Max: 99.1 (01 Mar 2021 20:23)  HR: 116 (02 Mar 2021 12:05) (97 - 116)  BP: 129/72 (02 Mar 2021 11:52) (129/72 - 149/79)  BP(mean): --  RR: 18 (02 Mar 2021 11:52) (18 - 20)  SpO2: 96% (02 Mar 2021 12:05) (94% - 100%)      PHYSICAL EXAM:  HEENT: n/a  Neck: supple no LN's  Respiratory: bilateral rhoncherous sounds no rales  Cardiovascular: S1 S2 geovany no murmurs  Gastrointestinal: +BS with soft, large abdominal pannus; nontender    Extremities: edema of extremities have resolved  Skin: no rashes  Ortho: no jt swelling or erythema  Neuro: awake and somewhat alert      LABS/DIAGNOSTIC TESTS:                          14.4   5.85  )-----------( 166      ( 02 Mar 2021 09:27 )             45.1   WBC Count: 5.85 K/uL (03-02 @ 09:27)  WBC Count: 8.16 K/uL (03-01 @ 08:25)  WBC Count: 6.08 K/uL (02-28 @ 06:01)  WBC Count: 6.60 K/uL (02-27 @ 05:37)  WBC Count: 6.22 K/uL (02-26 @ 05:10)    03-02    144  |  112<H>  |  35<H>  ----------------------------<  84  5.0   |  24  |  0.93    Ca    8.9      02 Mar 2021 09:27  Phos  3.4     03-01  Mg     2.6     03-01    TPro  6.9  /  Alb  2.2<L>  /  TBili  0.7  /  DBili  x   /  AST  57<H>  /  ALT  72<H>  /  AlkPhos  138<H>  03-02            CULTURES:   .Blood Blood-Peripheral  02-21 @ 12:05   No growth to date.  --  --      .Blood Blood-Peripheral  02-21 @ 12:04   No growth to date.  --  --      .Blood Blood-Peripheral  02-19 @ 03:00   Growth in anaerobic bottle: Enterococcus faecalis      .Urine Clean Catch (Midstream)  02-19 @ 02:34   50,000 - 99,000 CFU/mL Klebsiella pneumoniae  --  Klebsiella pneumoniae        RADIOLOGY:  no new studies

## 2021-03-02 NOTE — PROGRESS NOTE ADULT - ASSESSMENT
Aspiration pneumonia   Bacteremia - likely contaminants  ?UTI  ·	completed course of antibiotics   ·	reconsult prn

## 2021-03-02 NOTE — CHART NOTE - NSCHARTNOTEFT_GEN_A_CORE
Assessment:   75yMalePatient is a 75y old  Male who presents with a chief complaint of Unresponsive (02 Mar 2021 13:00)  pt visited. Pt DG from ICU/ 6 N on 2/27. NG TF D/C d on 2/26. SLP valery Noted on 2/26- Dysphagia Pureed  Nectar Liquids. Wife at bedside.  Wife brings  [place] food from Home . Po intake is 100 % of meal Per NSG Flow sheet.       Factors impacting intake: [ ] none [ ] nausea  [ ] vomiting [ ] diarrhea [ ] constipation  [ ]chewing problems [ ] swallowing issues  [ ] other:     Diet Prescription:  Diet, Pureed:   Consistent Carbohydrate {No Snacks}  Nectar Consistency (NEC CON) (02-26-21 @ 17:42)    Intake:  100 %     Current Weight:   % Weight Change Bed  Scale 244 LB     Pertinent Medications: MEDICATIONS  (STANDING):  aspirin  chewable 81 milliGRAM(s) Oral daily  atorvastatin 80 milliGRAM(s) Oral at bedtime  chlorhexidine 2% Cloths 1 Application(s) Topical <User Schedule>  dextrose 50% Injectable 25 Gram(s) IV Push once  enoxaparin Injectable 120 milliGRAM(s) SubCutaneous two times a day  insulin lispro (ADMELOG) corrective regimen sliding scale   SubCutaneous Before meals and at bedtime  midodrine 5 milliGRAM(s) Oral every 8 hours    MEDICATIONS  (PRN):  acetaminophen    Suspension .. 650 milliGRAM(s) Oral every 6 hours PRN Temp greater or equal to 38C (100.4F), Mild Pain (1 - 3)  artificial  tears Solution 1 Drop(s) Both EYES every 4 hours PRN Dry Eyes  guaiFENesin   Syrup  (Sugar-Free) 100 milliGRAM(s) Oral every 4 hours PRN Cough    Pertinent Labs: 03-02 Na144 mmol/L Glu 84 mg/dL K+ 5.0 mmol/L Cr  0.93 mg/dL BUN 35 mg/dL<H> 03-01 Phos 3.4 mg/dL 03-02 Alb 2.2 g/dL<L> 02-25 Chol --    LDL --    HDL --    Trig 141 mg/dL     CAPILLARY BLOOD GLUCOSE      POCT Blood Glucose.: 134 mg/dL (02 Mar 2021 11:20)  POCT Blood Glucose.: 95 mg/dL (02 Mar 2021 08:01)  POCT Blood Glucose.: 92 mg/dL (01 Mar 2021 20:41)  POCT Blood Glucose.: 85 mg/dL (01 Mar 2021 16:47)    Skin:     Estimated Needs:   [ ] no change since previous assessment  [ ] recalculated:     Previous Nutrition Diagnosis:   [ ] Inadequate Energy Intake [ ]Inadequate Oral Intake [ ] Excessive Energy Intake   [ ] Underweight [ ] Increased Nutrient Needs [ ] Overweight/Obesity   [ ] Altered GI Function [ ] Unintended Weight Loss [ ] Food & Nutrition Related Knowledge Deficit [ ] Malnutrition   (x) Swallowing difficulty Continuos.    Nutrition Diagnosis is [x ] ongoing  [ ] resolved [ ] not applicable     New Nutrition Diagnosis: [ ] not applicable       Interventions:   Recommend  [ ] Change Diet To:  [ ] Nutrition Supplement  [ ] Nutrition Support  [x ] Other: May consider Ensure pudding BID.    Monitoring and Evaluation:   [ ] PO intake [ x ] Tolerance to diet prescription [ x ] weights [ x ] labs[ x ] follow up per protocol  [ ] other: Assessment:   75yMalePatient is a 75y old  Male who presents with a chief complaint of Unresponsive (02 Mar 2021 13:00)  pt visited. Pt DG from ICU/ 6 N on 2/27. NG TF D/C d on 2/26. SLP valery Noted on 2/26- Dysphagia Pureed  Nectar Liquids. Wife at bedside.  Wife brings  [place] food from Home . Po intake is 100 % of meal Per NSG Flow sheet.  Pt is fed by staff.      Factors impacting intake: [ ] none [ ] nausea  [ ] vomiting [ ] diarrhea [ ] constipation  [ ]chewing problems [ ] swallowing issues  [ ] other:     Diet Prescription:  Diet, Pureed:   Consistent Carbohydrate {No Snacks}  Nectar Consistency (NEC CON) (02-26-21 @ 17:42)    Intake:  100 %     Current Weight:   % Weight Change Bed  Scale 244 LB     Pertinent Medications: MEDICATIONS  (STANDING):  aspirin  chewable 81 milliGRAM(s) Oral daily  atorvastatin 80 milliGRAM(s) Oral at bedtime  chlorhexidine 2% Cloths 1 Application(s) Topical <User Schedule>  dextrose 50% Injectable 25 Gram(s) IV Push once  enoxaparin Injectable 120 milliGRAM(s) SubCutaneous two times a day  insulin lispro (ADMELOG) corrective regimen sliding scale   SubCutaneous Before meals and at bedtime  midodrine 5 milliGRAM(s) Oral every 8 hours    MEDICATIONS  (PRN):  acetaminophen    Suspension .. 650 milliGRAM(s) Oral every 6 hours PRN Temp greater or equal to 38C (100.4F), Mild Pain (1 - 3)  artificial  tears Solution 1 Drop(s) Both EYES every 4 hours PRN Dry Eyes  guaiFENesin   Syrup  (Sugar-Free) 100 milliGRAM(s) Oral every 4 hours PRN Cough    Pertinent Labs: 03-02 Na144 mmol/L Glu 84 mg/dL K+ 5.0 mmol/L Cr  0.93 mg/dL BUN 35 mg/dL<H> 03-01 Phos 3.4 mg/dL 03-02 Alb 2.2 g/dL<L> 02-25 Chol --    LDL --    HDL --    Trig 141 mg/dL     CAPILLARY BLOOD GLUCOSE      POCT Blood Glucose.: 134 mg/dL (02 Mar 2021 11:20)  POCT Blood Glucose.: 95 mg/dL (02 Mar 2021 08:01)  POCT Blood Glucose.: 92 mg/dL (01 Mar 2021 20:41)  POCT Blood Glucose.: 85 mg/dL (01 Mar 2021 16:47)    Skin:     Estimated Needs:   [ ] no change since previous assessment  [ ] recalculated:     Previous Nutrition Diagnosis:   [ ] Inadequate Energy Intake [ ]Inadequate Oral Intake [ ] Excessive Energy Intake   [ ] Underweight [ ] Increased Nutrient Needs [ ] Overweight/Obesity   [ ] Altered GI Function [ ] Unintended Weight Loss [ ] Food & Nutrition Related Knowledge Deficit [ ] Malnutrition   (x) Swallowing difficulty Continuos.    Nutrition Diagnosis is [x ] ongoing  [ ] resolved [ ] not applicable     New Nutrition Diagnosis: [ ] not applicable       Interventions:   Recommend  [ ] Change Diet To:  [ ] Nutrition Supplement  [ ] Nutrition Support  [x ] Other: May consider Ensure pudding BID.    Monitoring and Evaluation:   [ ] PO intake [ x ] Tolerance to diet prescription [ x ] weights [ x ] labs[ x ] follow up per protocol  [ ] other:

## 2021-03-03 LAB
ALBUMIN SERPL ELPH-MCNC: 2.6 G/DL — LOW (ref 3.5–5)
ALP SERPL-CCNC: 137 U/L — HIGH (ref 40–120)
ALT FLD-CCNC: 64 U/L DA — HIGH (ref 10–60)
ANION GAP SERPL CALC-SCNC: 2 MMOL/L — LOW (ref 5–17)
AST SERPL-CCNC: 28 U/L — SIGNIFICANT CHANGE UP (ref 10–40)
BILIRUB SERPL-MCNC: 0.5 MG/DL — SIGNIFICANT CHANGE UP (ref 0.2–1.2)
BUN SERPL-MCNC: 36 MG/DL — HIGH (ref 7–18)
CALCIUM SERPL-MCNC: 9.3 MG/DL — SIGNIFICANT CHANGE UP (ref 8.4–10.5)
CHLORIDE SERPL-SCNC: 114 MMOL/L — HIGH (ref 96–108)
CO2 SERPL-SCNC: 32 MMOL/L — HIGH (ref 22–31)
CREAT SERPL-MCNC: 1.02 MG/DL — SIGNIFICANT CHANGE UP (ref 0.5–1.3)
GLUCOSE BLDC GLUCOMTR-MCNC: 101 MG/DL — HIGH (ref 70–99)
GLUCOSE BLDC GLUCOMTR-MCNC: 114 MG/DL — HIGH (ref 70–99)
GLUCOSE BLDC GLUCOMTR-MCNC: 124 MG/DL — HIGH (ref 70–99)
GLUCOSE BLDC GLUCOMTR-MCNC: 99 MG/DL — SIGNIFICANT CHANGE UP (ref 70–99)
GLUCOSE SERPL-MCNC: 109 MG/DL — HIGH (ref 70–99)
HCT VFR BLD CALC: 41 % — SIGNIFICANT CHANGE UP (ref 39–50)
HGB BLD-MCNC: 13.2 G/DL — SIGNIFICANT CHANGE UP (ref 13–17)
MCHC RBC-ENTMCNC: 31.6 PG — SIGNIFICANT CHANGE UP (ref 27–34)
MCHC RBC-ENTMCNC: 32.2 GM/DL — SIGNIFICANT CHANGE UP (ref 32–36)
MCV RBC AUTO: 98.1 FL — SIGNIFICANT CHANGE UP (ref 80–100)
NRBC # BLD: 0 /100 WBCS — SIGNIFICANT CHANGE UP (ref 0–0)
PLATELET # BLD AUTO: 232 K/UL — SIGNIFICANT CHANGE UP (ref 150–400)
POTASSIUM SERPL-MCNC: 3.7 MMOL/L — SIGNIFICANT CHANGE UP (ref 3.5–5.3)
POTASSIUM SERPL-SCNC: 3.7 MMOL/L — SIGNIFICANT CHANGE UP (ref 3.5–5.3)
PROT SERPL-MCNC: 6.8 G/DL — SIGNIFICANT CHANGE UP (ref 6–8.3)
RBC # BLD: 4.18 M/UL — LOW (ref 4.2–5.8)
RBC # FLD: 11.9 % — SIGNIFICANT CHANGE UP (ref 10.3–14.5)
SODIUM SERPL-SCNC: 148 MMOL/L — HIGH (ref 135–145)
WBC # BLD: 10.38 K/UL — SIGNIFICANT CHANGE UP (ref 3.8–10.5)
WBC # FLD AUTO: 10.38 K/UL — SIGNIFICANT CHANGE UP (ref 3.8–10.5)

## 2021-03-03 RX ORDER — METOPROLOL TARTRATE 50 MG
25 TABLET ORAL DAILY
Refills: 0 | Status: DISCONTINUED | OUTPATIENT
Start: 2021-03-03 | End: 2021-03-08

## 2021-03-03 RX ADMIN — ENOXAPARIN SODIUM 120 MILLIGRAM(S): 100 INJECTION SUBCUTANEOUS at 17:46

## 2021-03-03 RX ADMIN — ATORVASTATIN CALCIUM 80 MILLIGRAM(S): 80 TABLET, FILM COATED ORAL at 21:11

## 2021-03-03 RX ADMIN — ENOXAPARIN SODIUM 120 MILLIGRAM(S): 100 INJECTION SUBCUTANEOUS at 07:00

## 2021-03-03 NOTE — PROGRESS NOTE ADULT - PROBLEM SELECTOR PLAN 3
Urine culture grew klebsiella  resolved sepsis  continue ceftriaxone till 3/3 8pm last dose- completed Urine culture grew klebsiella  resolved sepsis  completed ceftriaxone

## 2021-03-03 NOTE — PROGRESS NOTE ADULT - PROBLEM SELECTOR PLAN 5
likely due to recent stroke  seen by SLP - megan/ odilon thick  re-evaluation requested   continue aspiration precaution likely due to recent stroke  seen by SLP - puree/ nectar thick  re-evaluation requested   continue aspiration precaution

## 2021-03-03 NOTE — PROGRESS NOTE ADULT - ASSESSMENT
75 yr old male fom NH, H/O CVA recent/dementia/HTN/BPH, admit hospital for unresponsive, intubated at ER, CT chest/ABD showed PNA/aspiration(?)/blood culture positive, start pressor for septic shock//IV ABS, F/U pan culture result, ICU care, close monitor lab, titrate pressor, elevated LFT, shock liver, close monitor LFT  stable, S/P extubation, tolerated po diet, PT eval, D/C planning to rehab, family request different NH,  follow up for D/C rehab

## 2021-03-03 NOTE — PROGRESS NOTE ADULT - SUBJECTIVE AND OBJECTIVE BOX
NP Note discussed with  Primary Attending    Patient is a 75y old  Male who presents with a chief complaint of Unresponsive (03 Mar 2021 11:01)      INTERVAL HPI/OVERNIGHT EVENTS: no new complaints    MEDICATIONS  (STANDING):  aspirin  chewable 81 milliGRAM(s) Oral daily  atorvastatin 80 milliGRAM(s) Oral at bedtime  chlorhexidine 2% Cloths 1 Application(s) Topical <User Schedule>  dextrose 50% Injectable 25 Gram(s) IV Push once  enoxaparin Injectable 120 milliGRAM(s) SubCutaneous two times a day  insulin lispro (ADMELOG) corrective regimen sliding scale   SubCutaneous Before meals and at bedtime  metoprolol succinate ER 25 milliGRAM(s) Oral daily    MEDICATIONS  (PRN):  acetaminophen    Suspension .. 650 milliGRAM(s) Oral every 6 hours PRN Temp greater or equal to 38C (100.4F), Mild Pain (1 - 3)  artificial  tears Solution 1 Drop(s) Both EYES every 4 hours PRN Dry Eyes  guaiFENesin   Syrup  (Sugar-Free) 100 milliGRAM(s) Oral every 4 hours PRN Cough      __________________________________________________  REVIEW OF SYSTEMS:    CONSTITUTIONAL: No fever,   EYES: no acute visual disturbances  NECK: No pain or stiffness  RESPIRATORY: No cough; No shortness of breath  CARDIOVASCULAR: No chest pain, no palpitations  GASTROINTESTINAL: No pain. No nausea or vomiting; No diarrhea   NEUROLOGICAL: No headache or numbness, no tremors  MUSCULOSKELETAL: No joint pain, no muscle pain  GENITOURINARY: no dysuria, no frequency, no hesitancy  PSYCHIATRY: no depression , no anxiety  ALL OTHER  ROS negative        Vital Signs Last 24 Hrs  T(C): 37.2 (03 Mar 2021 04:57), Max: 37.2 (03 Mar 2021 04:57)  T(F): 98.9 (03 Mar 2021 04:57), Max: 98.9 (03 Mar 2021 04:57)  HR: 99 (03 Mar 2021 12:46) (99 - 111)  BP: 139/83 (03 Mar 2021 04:57) (133/82 - 140/90)  BP(mean): 94 (02 Mar 2021 16:27) (94 - 101)  RR: 17 (03 Mar 2021 04:57) (17 - 18)  SpO2: 94% (03 Mar 2021 12:46) (94% - 100%)    ________________________________________________  PHYSICAL EXAM:  GENERAL: NAD  HEENT: Normocephalic;  conjunctivae and sclerae clear; moist mucous membranes;   NECK : supple  CHEST/LUNG: Clear to auscultation bilaterally with good air entry   HEART: S1 S2  regular; no murmurs, gallops or rubs  ABDOMEN: Soft, Nontender, Nondistended; Bowel sounds present  EXTREMITIES: no cyanosis; no edema; no calf tenderness  SKIN: warm and dry; no rash  NERVOUS SYSTEM:  Awake and alert; Oriented  to place, person and time ; no new deficits    _________________________________________________  LABS:                        13.2   10.38 )-----------( 232      ( 03 Mar 2021 09:16 )             41.0     03-03    148<H>  |  114<H>  |  36<H>  ----------------------------<  109<H>  3.7   |  32<H>  |  1.02    Ca    9.3      03 Mar 2021 09:16    TPro  6.8  /  Alb  2.6<L>  /  TBili  0.5  /  DBili  x   /  AST  28  /  ALT  64<H>  /  AlkPhos  137<H>  03-03        CAPILLARY BLOOD GLUCOSE      POCT Blood Glucose.: 114 mg/dL (03 Mar 2021 11:52)  POCT Blood Glucose.: 124 mg/dL (03 Mar 2021 07:48)  POCT Blood Glucose.: 80 mg/dL (02 Mar 2021 21:43)  POCT Blood Glucose.: 122 mg/dL (02 Mar 2021 16:59)        RADIOLOGY & ADDITIONAL TESTS:    Imaging Personally Reviewed:  YES/NO    Consultant(s) Notes Reviewed:   YES/ No    Care Discussed with Consultants :     Plan of care was discussed with patient and /or primary care giver; all questions and concerns were addressed and care was aligned with patient's wishes.     NP Note discussed with  Primary Attending    Patient is a 75y old  Male who presents with a chief complaint of Unresponsive (03 Mar 2021 11:01)      INTERVAL HPI/OVERNIGHT EVENTS: no new complaints    MEDICATIONS  (STANDING):  aspirin  chewable 81 milliGRAM(s) Oral daily  atorvastatin 80 milliGRAM(s) Oral at bedtime  chlorhexidine 2% Cloths 1 Application(s) Topical <User Schedule>  dextrose 50% Injectable 25 Gram(s) IV Push once  enoxaparin Injectable 120 milliGRAM(s) SubCutaneous two times a day  insulin lispro (ADMELOG) corrective regimen sliding scale   SubCutaneous Before meals and at bedtime  metoprolol succinate ER 25 milliGRAM(s) Oral daily    MEDICATIONS  (PRN):  acetaminophen    Suspension .. 650 milliGRAM(s) Oral every 6 hours PRN Temp greater or equal to 38C (100.4F), Mild Pain (1 - 3)  artificial  tears Solution 1 Drop(s) Both EYES every 4 hours PRN Dry Eyes  guaiFENesin   Syrup  (Sugar-Free) 100 milliGRAM(s) Oral every 4 hours PRN Cough      __________________________________________________  REVIEW OF SYSTEMS:    unable to obtain due to mental status     Vital Signs Last 24 Hrs  T(C): 37.2 (03 Mar 2021 04:57), Max: 37.2 (03 Mar 2021 04:57)  T(F): 98.9 (03 Mar 2021 04:57), Max: 98.9 (03 Mar 2021 04:57)  HR: 99 (03 Mar 2021 12:46) (99 - 111)  BP: 139/83 (03 Mar 2021 04:57) (133/82 - 140/90)  BP(mean): 94 (02 Mar 2021 16:27) (94 - 101)  RR: 17 (03 Mar 2021 04:57) (17 - 18)  SpO2: 94% (03 Mar 2021 12:46) (94% - 100%)    ________________________________________________  PHYSICAL EXAM:  GENERAL: NAD  HEENT: Normocephalic;  conjunctivae and sclerae clear; moist mucous membranes;   NECK : supple  CHEST/LUNG: Clear to auscultation bilaterally with good air entry   HEART: S1 S2  regular; no murmurs, gallops or rubs  ABDOMEN: Soft, Nontender, Nondistended; Bowel sounds present  EXTREMITIES: no cyanosis; no edema; no calf tenderness  SKIN: warm and dry; no rash  NERVOUS SYSTEM:  Awake and alert; Oriented to  person  ; no new deficits    _________________________________________________  LABS:                        13.2   10.38 )-----------( 232      ( 03 Mar 2021 09:16 )             41.0     03-03    148<H>  |  114<H>  |  36<H>  ----------------------------<  109<H>  3.7   |  32<H>  |  1.02    Ca    9.3      03 Mar 2021 09:16    TPro  6.8  /  Alb  2.6<L>  /  TBili  0.5  /  DBili  x   /  AST  28  /  ALT  64<H>  /  AlkPhos  137<H>  03-03        CAPILLARY BLOOD GLUCOSE      POCT Blood Glucose.: 114 mg/dL (03 Mar 2021 11:52)  POCT Blood Glucose.: 124 mg/dL (03 Mar 2021 07:48)  POCT Blood Glucose.: 80 mg/dL (02 Mar 2021 21:43)  POCT Blood Glucose.: 122 mg/dL (02 Mar 2021 16:59)        RADIOLOGY & ADDITIONAL TESTS:  < from: Xray Chest 1 View- PORTABLE-Routine (Xray Chest 1 View- PORTABLE-Routine in AM.) (02.26.21 @ 09:18) >    EXAM:  XR CHEST PORTABLE ROUTINE 1V                            PROCEDURE DATE:  02/26/2021          INTERPRETATION:  CLINICAL STATEMENT: Follow-up chest pain.    TECHNIQUE: AP view of the chest.    COMPARISON: 2/25/2021    FINDINGS/  IMPRESSION:  ET tube, feeding tube, left central line again noted. No pneumothorax.    Improving left pleural effusion. Better aeration right lung base.    Heart size cannot be accurately assessed in this projection.                VINH THOMAS MD; Attending Radiologist  This document has been electronically signed. Feb 26 2021 12:45PM    < end of copied text >    Imaging Personally Reviewed:  YES    Consultant(s) Notes Reviewed:   YES    Care Discussed with Consultants : pulmonology / ID     Plan of care was discussed with patient and /or primary care giver; all questions and concerns were addressed and care was aligned with patient's wishes.

## 2021-03-03 NOTE — PROGRESS NOTE ADULT - PROBLEM SELECTOR PLAN 1
found unresponsive in NH  required intubation -- s/p extubation on 2/26  currently on RA, continue BIPAP at night  likely du to HCAP  on ceftriaxone till 3/3 8pm   ID Dr. Sosa is following found unresponsive in NH  required intubation -- s/p extubation on 2/26  currently on RA, continue BIPAP at night  likely du to HCAP  completed ABT   ID Dr. Sosa

## 2021-03-03 NOTE — PROGRESS NOTE ADULT - SUBJECTIVE AND OBJECTIVE BOX
Patient is a 75y old  Male who presents with a chief complaint of Unresponsive (02 Mar 2021 13:00)/metabolic encephalopathy/septic shock/intubation/extubation/aspiration PNA/UTI/shock liver/URSULA, patient improved, pending D/C NH      INTERVAL HPI/OVERNIGHT EVENTS:  T(C): 37.2 (03-03-21 @ 04:57), Max: 37.2 (03-03-21 @ 04:57)  HR: 102 (03-03-21 @ 04:57) (102 - 116)  BP: 139/83 (03-03-21 @ 04:57) (129/72 - 140/90)  RR: 17 (03-03-21 @ 04:57) (17 - 18)  SpO2: 100% (03-03-21 @ 04:57) (95% - 100%)  Wt(kg): --    LABS:                        13.2   10.38 )-----------( 232      ( 03 Mar 2021 09:16 )             41.0     03-03    148<H>  |  114<H>  |  36<H>  ----------------------------<  109<H>  3.7   |  32<H>  |  1.02    Ca    9.3      03 Mar 2021 09:16    TPro  6.8  /  Alb  2.6<L>  /  TBili  0.5  /  DBili  x   /  AST  28  /  ALT  64<H>  /  AlkPhos  137<H>  03-03        CAPILLARY BLOOD GLUCOSE      POCT Blood Glucose.: 124 mg/dL (03 Mar 2021 07:48)  POCT Blood Glucose.: 80 mg/dL (02 Mar 2021 21:43)  POCT Blood Glucose.: 122 mg/dL (02 Mar 2021 16:59)  POCT Blood Glucose.: 134 mg/dL (02 Mar 2021 11:20)        RADIOLOGY & ADDITIONAL TESTS:    Consultant(s) Notes Reviewed:  [x ] YES  [ ] NO    PHYSICAL EXAM:  GENERAL: well built, well nourished  HEAD:  Atraumatic, Normocephalic  EYES: EOMI, PERRLA, conjunctiva and sclera clear  ENT: No tonsillar erythema, exudates, or enlargement; Moist mucous membranes, Good dentition, No lesions  NECK: Supple, No JVD, Normal thyroid, no enlarged nodes  NERVOUS SYSTEM:  Alert & Oriented X3, Good concentration; Motor Strength 5/5 B/L upper and lower extremities; DTRs 2+ intact and symmetric, sensory intact  CHEST/LUNG: B/L good air entry; No rales, rhonchi, or wheezing  HEART: S1S2 normal, no S3, Regular rate and rhythm; No murmurs, rubs, or gallops  ABDOMEN: Soft, Nontender, Nondistended; Bowel sounds present  EXTREMITIES:  2+ Peripheral Pulses, No clubbing, cyanosis,  edema improved  LYMPH: No lymphadenopathy noted  SKIN: No rashes or lesions    Care Discussed with Consultants/Other Providers [ x] YES  [ ] NO

## 2021-03-03 NOTE — PROGRESS NOTE ADULT - PROBLEM SELECTOR PLAN 10
unable to reach out to wife and son today -- called both of them x 4 times and left VM x 1 each   family wants patient home VS [facility] [place] TBD   PT recommendations   COVID negative 2/23, 3/1     follow up for safe discharge planning family wants patient home VS [facility] [place] TBD   PT recommendations -JAQUELINE   COVID negative 2/23, 3/1     follow up for safe discharge planning PT - JAQUELINE   family chose [facility] [place] - pending auth   COVID negative 2/23, 3/1  - if auth takes longer, need to order STAT in AM    is following

## 2021-03-03 NOTE — PROGRESS NOTE ADULT - PROBLEM SELECTOR PLAN 4
2nd stroke in 3 weeks ago  Rt sided weakness  + dysphagia  was on xarelto 20mg QPM in NH - re- discuss with Dr. Daniels for d/c 2nd stroke in 3 weeks ago  Rt sided weakness  + dysphagia  was on xarelto 20mg QPM in NH

## 2021-03-03 NOTE — PROGRESS NOTE ADULT - PROBLEM SELECTOR PLAN 9
on lovenox 120mg BID for recent stroke  pt was on xarelto 20mg QPM in NH- may change back to xarelto on discharge per Dr. Daniels   continue PPI for GI ppx.

## 2021-03-04 LAB
ANION GAP SERPL CALC-SCNC: 6 MMOL/L — SIGNIFICANT CHANGE UP (ref 5–17)
BUN SERPL-MCNC: 31 MG/DL — HIGH (ref 7–18)
CALCIUM SERPL-MCNC: 9.1 MG/DL — SIGNIFICANT CHANGE UP (ref 8.4–10.5)
CHLORIDE SERPL-SCNC: 116 MMOL/L — HIGH (ref 96–108)
CO2 SERPL-SCNC: 28 MMOL/L — SIGNIFICANT CHANGE UP (ref 22–31)
CREAT SERPL-MCNC: 0.95 MG/DL — SIGNIFICANT CHANGE UP (ref 0.5–1.3)
GLUCOSE BLDC GLUCOMTR-MCNC: 104 MG/DL — HIGH (ref 70–99)
GLUCOSE BLDC GLUCOMTR-MCNC: 107 MG/DL — HIGH (ref 70–99)
GLUCOSE BLDC GLUCOMTR-MCNC: 112 MG/DL — HIGH (ref 70–99)
GLUCOSE BLDC GLUCOMTR-MCNC: 90 MG/DL — SIGNIFICANT CHANGE UP (ref 70–99)
GLUCOSE SERPL-MCNC: 119 MG/DL — HIGH (ref 70–99)
HCT VFR BLD CALC: 43.3 % — SIGNIFICANT CHANGE UP (ref 39–50)
HGB BLD-MCNC: 14.3 G/DL — SIGNIFICANT CHANGE UP (ref 13–17)
MCHC RBC-ENTMCNC: 32.3 PG — SIGNIFICANT CHANGE UP (ref 27–34)
MCHC RBC-ENTMCNC: 33 GM/DL — SIGNIFICANT CHANGE UP (ref 32–36)
MCV RBC AUTO: 97.7 FL — SIGNIFICANT CHANGE UP (ref 80–100)
NRBC # BLD: 0 /100 WBCS — SIGNIFICANT CHANGE UP (ref 0–0)
PLATELET # BLD AUTO: 229 K/UL — SIGNIFICANT CHANGE UP (ref 150–400)
POTASSIUM SERPL-MCNC: 3.7 MMOL/L — SIGNIFICANT CHANGE UP (ref 3.5–5.3)
POTASSIUM SERPL-SCNC: 3.7 MMOL/L — SIGNIFICANT CHANGE UP (ref 3.5–5.3)
RBC # BLD: 4.43 M/UL — SIGNIFICANT CHANGE UP (ref 4.2–5.8)
RBC # FLD: 12.1 % — SIGNIFICANT CHANGE UP (ref 10.3–14.5)
SARS-COV-2 RNA SPEC QL NAA+PROBE: SIGNIFICANT CHANGE UP
SODIUM SERPL-SCNC: 150 MMOL/L — HIGH (ref 135–145)
WBC # BLD: 6.92 K/UL — SIGNIFICANT CHANGE UP (ref 3.8–10.5)
WBC # FLD AUTO: 6.92 K/UL — SIGNIFICANT CHANGE UP (ref 3.8–10.5)

## 2021-03-04 RX ORDER — SODIUM CHLORIDE 9 MG/ML
1000 INJECTION, SOLUTION INTRAVENOUS
Refills: 0 | Status: DISCONTINUED | OUTPATIENT
Start: 2021-03-04 | End: 2021-03-05

## 2021-03-04 RX ADMIN — CHLORHEXIDINE GLUCONATE 1 APPLICATION(S): 213 SOLUTION TOPICAL at 05:53

## 2021-03-04 RX ADMIN — ENOXAPARIN SODIUM 120 MILLIGRAM(S): 100 INJECTION SUBCUTANEOUS at 18:08

## 2021-03-04 RX ADMIN — ENOXAPARIN SODIUM 120 MILLIGRAM(S): 100 INJECTION SUBCUTANEOUS at 05:57

## 2021-03-04 RX ADMIN — Medication 25 MILLIGRAM(S): at 05:58

## 2021-03-04 NOTE — PROGRESS NOTE ADULT - PROBLEM SELECTOR PLAN 3
2nd stroke in 3 weeks ago  Rt sided weakness  + dysphagia  -home medication xarelto 20mg QPM, held currenlty on full dose Lovenox

## 2021-03-04 NOTE — PROGRESS NOTE ADULT - SUBJECTIVE AND OBJECTIVE BOX
NP Note discussed with  Primary Attending    Patient is a 75y old  Male who presents with a chief complaint of Unresponsive (04 Mar 2021 11:04)      INTERVAL HPI/OVERNIGHT EVENTS: Patient seen and examined at bedside. Patient confused, no new complaints    MEDICATIONS  (STANDING):  aspirin  chewable 81 milliGRAM(s) Oral daily  atorvastatin 80 milliGRAM(s) Oral at bedtime  chlorhexidine 2% Cloths 1 Application(s) Topical <User Schedule>  dextrose 50% Injectable 25 Gram(s) IV Push once  enoxaparin Injectable 120 milliGRAM(s) SubCutaneous two times a day  insulin lispro (ADMELOG) corrective regimen sliding scale   SubCutaneous Before meals and at bedtime  metoprolol succinate ER 25 milliGRAM(s) Oral daily    MEDICATIONS  (PRN):  acetaminophen    Suspension .. 650 milliGRAM(s) Oral every 6 hours PRN Temp greater or equal to 38C (100.4F), Mild Pain (1 - 3)  artificial  tears Solution 1 Drop(s) Both EYES every 4 hours PRN Dry Eyes  guaiFENesin   Syrup  (Sugar-Free) 100 milliGRAM(s) Oral every 4 hours PRN Cough      __________________________________________________  REVIEW OF SYSTEMS:    unable to assess poor historian      Vital Signs Last 24 Hrs  T(C): 36.8 (04 Mar 2021 14:00), Max: 36.9 (03 Mar 2021 20:13)  T(F): 98.3 (04 Mar 2021 14:00), Max: 98.5 (03 Mar 2021 20:13)  HR: 103 (04 Mar 2021 14:00) (85 - 103)  BP: 125/100 (04 Mar 2021 14:00) (125/100 - 149/89)  BP(mean): --  RR: 19 (04 Mar 2021 14:00) (14 - 19)  SpO2: 99% (04 Mar 2021 14:00) (95% - 100%)    ________________________________________________  PHYSICAL EXAM:  GENERAL: NAD  HEENT: Normocephalic;  conjunctivae and sclerae clear; moist mucous membranes;   NECK : supple  CHEST/LUNG: Clear to auscultation bilaterally with good air entry   HEART: S1 S2  regular; no murmurs, gallops or rubs  ABDOMEN: obese, Soft, Nontender, Nondistended; Bowel sounds present  EXTREMITIES: no cyanosis; no edema; no calf tenderness  SKIN: warm and dry; no rash  NERVOUS SYSTEM:  confused    _________________________________________________  LABS:                        14.3   6.92  )-----------( 229      ( 04 Mar 2021 10:39 )             43.3     03-04    150<H>  |  116<H>  |  31<H>  ----------------------------<  119<H>  3.7   |  28  |  0.95    Ca    9.1      04 Mar 2021 10:39    TPro  6.8  /  Alb  2.6<L>  /  TBili  0.5  /  DBili  x   /  AST  28  /  ALT  64<H>  /  AlkPhos  137<H>  03-03        CAPILLARY BLOOD GLUCOSE      POCT Blood Glucose.: 90 mg/dL (04 Mar 2021 11:45)  POCT Blood Glucose.: 104 mg/dL (04 Mar 2021 07:23)  POCT Blood Glucose.: 99 mg/dL (03 Mar 2021 21:04)  POCT Blood Glucose.: 101 mg/dL (03 Mar 2021 17:02)        RADIOLOGY & ADDITIONAL TESTS:  < from: Xray Chest 1 View- PORTABLE-Routine (Xray Chest 1 View- PORTABLE-Routine in AM.) (02.26.21 @ 09:18) >    EXAM:  XR CHEST PORTABLE ROUTINE 1V                            PROCEDURE DATE:  02/26/2021          INTERPRETATION:  CLINICAL STATEMENT: Follow-up chest pain.    TECHNIQUE: AP view of the chest.    COMPARISON: 2/25/2021    FINDINGS/  IMPRESSION:  ET tube, feeding tube, left central line again noted. No pneumothorax.    Improving left pleural effusion. Better aeration right lung base.    Heart size cannot be accurately assessed in this projection.    < end of copied text >  < from: US Hepatic & Pancreatic (02.21.21 @ 21:44) >    EXAM:  US LIVER AND PANCREAS                            PROCEDURE DATE:  02/21/2021          INTERPRETATION:  CLINICAL INFORMATION: Elevated transaminitis.    TECHNIQUE: Sonographic images of the abdomen was performed.    COMPARISON: CT of the abdomen and pelvis dated 2/19/2021    FINDINGS:    Evaluation is limited due to overlying bowel gas.    LIVER: Within normal limits.  GALLBLADDER: Not visualized.  CBD: Not visualized.  PANCREAS: Evaluation of the pancreas is limited secondary to overlying bowel gas.  RIGHT KIDNEY: Evaluation of the right kidney is limited secondary to overlying bowel gas.  AORTA AND IVC: Visualized portions of the IVC and aorta are unremarkable.    IMPRESSION: Limited study. Nonvisualization of the gallbladder.    < end of copied text >  < from: CT Chest No Cont (02.19.21 @ 02:44) >  EXAM:  CT ABDOMEN AND PELVIS                          EXAM:  CT CHEST                            PROCEDURE DATE:  02/19/2021          INTERPRETATION:  CLINICAL INFORMATION:  Fever.    COMPARISON: None.    PROCEDURE:  CT of the Chest, Abdomen and Pelvis was performed without intravenous contrast.  Intravenous contrast: None.  Oral contrast: None.  Sagittal and coronal reformats were performed.  Postprocessed chest MIP reformatted images were created and reviewed.    FINDINGS:    CHEST:    LINES AND TUBES: Endotracheal tube and endogastric tube appear in place.  LUNGS AND LARGE AIRWAYS: Patent central airways. Right middle lobe as well as patchy bibasilar opacities. Findings nonspecific, likely represent developing pneumonia or aspiration.  PLEURA: No pleural effusion.  VESSELS: Atherosclerotic disease of the aorta and its branches.  HEART: Trace pericardial effusion and/or thickening with mild cardiomegaly. Coronary artery calcifications.  MEDIASTINUM AND JESSY: No lymphadenopathy.  CHESTWALL AND LOWER NECK: Bilateral gynecomastia.    ABDOMEN AND PELVIS:    LIVER: Enlarged measuring up to 20 cm in length.  BILE DUCTS: No significant dilatation.  GALLBLADDER: Cholecystectomy.  SPLEEN: Within normal limits.  PANCREAS: Within normal limits.  ADRENALS: Within normal limits.  KIDNEYS/URETERS: No hydronephrosis. Punctate nonobstructive stone in the upper pole of left kidney. Trace nonspecific bilateral perinephric stranding. 1.6 cm right renal cyst of higher attenuation than simple fluid. Consider nonemergent correlation with renal ultrasound.    BLADDER: Decompressed containing Garcia catheter.  REPRODUCTIVE ORGANS: Prominent prostate measuring up to 5.6 cm in transverse length.    BOWEL: No bowel obstruction. Colonic diverticulosis without acute diverticulitis. Appendix is not visualized without secondary findings of acute appendicitis.  PERITONEUM: No ascites.  VESSELS:  Atherosclerotic disease of the aorta and its branches.  RETROPERITONEUM: No lymphadenopathy.  ABDOMINAL WALL: A small fat containing umbilical and bilateral groin hernia is noted. Right groin femoral catheter identified.  BONES: Multilevel degenerative changes and scoliosis. Indeterminate sclerotic focus in T4 vertebral body.    IMPRESSION:    Right middle lobe as well as patchy bibasilar opacities. Findings nonspecific, likely represent developing pneumonia or aspiration.    No acute bowel inflammatory changes or obstruction.    No hydronephrosis. Punctate nonobstructive stone in the upper pole of left kidney. Trace nonspecific bilateral perinephric stranding.    1.6 cm right renal cyst of higher attenuation than simple fluid. Consider nonemergent correlation with renal ultrasound.    Additional findings as mentioned above.      < end of copied text >  < from: CT Head No Cont (02.19.21 @ 02:44) >    EXAM:  CT BRAIN                            PROCEDURE DATE:  02/19/2021          INTERPRETATION:  HISTORY: Altered mental status.    COMPARISON: None.    TECHNIQUE: Axial noncontrast CT images from the skull base to the vertex were obtained and submitted for interpretation. Coronal and sagittal reformatted images were performed. Bone and soft tissue windows were evaluated.    FINDINGS:    There is no acute intracranial hemorrhage, midline shift, or abnormal extra-axial fluid collection.    Age-indeterminate left occipital and posterior mesial temporal infarction with loss of gray-white differentiation in the left posterior cerebral artery (PCA) territory is present.    Chronic left cerebellar infarction with ex vacuo dilatation of the fourthventricle.    Patchy and confluent regions of periventricular and deep cerebral white matter hypoattenuation due to chronic microangiopathic ischemic changes. Atheromatous calcifications along the carotid siphons are present.    Mild centrally predominant cerebral volume loss noted.  No evidence of hydrocephalus. Basal cisterns are patent.    Bilateral ethmoid and maxillary sinus mucosal thickening bilateral maxillary sinus polyps and retention cyst noted. Nasopharyngeal secretions likely due to indwelling support devices. Mastoid air cells are clear. Calvarium is intact.    Endotracheal and nasogastric tubes are partially imaged.    IMPRESSION:    Age-indeterminate left PCA territory infarction. Consider MRI for further evaluation.    No acute intracranial bleeding.  Chronic left cerebellar infarction.      < end of copied text >    Imaging  Reviewed:  YES    Consultant(s) Notes Reviewed:   YES      Plan of care was discussed with patient and /or primary care giver; all questions and concerns were addressed

## 2021-03-04 NOTE — PROGRESS NOTE ADULT - SUBJECTIVE AND OBJECTIVE BOX
Patient is a 75y old  Male who presents with a chief complaint of Unresponsive (03 Mar 2021 13:28)/AMS/metabolic encephalopathy/septic shock/intubation/extubation/aspiration HCAP/UTI/URSULA/shock liver, pending autho for D/C [facility]      INTERVAL HPI/OVERNIGHT EVENTS:  T(C): 36.1 (03-04-21 @ 05:22), Max: 36.9 (03-03-21 @ 20:13)  HR: 99 (03-04-21 @ 05:22) (85 - 103)  BP: 149/89 (03-04-21 @ 05:22) (136/88 - 149/89)  RR: 18 (03-04-21 @ 05:22) (14 - 18)  SpO2: 95% (03-04-21 @ 05:22) (94% - 100%)  Wt(kg): --    LABS:                        14.3   6.92  )-----------( 229      ( 04 Mar 2021 10:39 )             43.3     03-03    148<H>  |  114<H>  |  36<H>  ----------------------------<  109<H>  3.7   |  32<H>  |  1.02    Ca    9.3      03 Mar 2021 09:16    TPro  6.8  /  Alb  2.6<L>  /  TBili  0.5  /  DBili  x   /  AST  28  /  ALT  64<H>  /  AlkPhos  137<H>  03-03        CAPILLARY BLOOD GLUCOSE      POCT Blood Glucose.: 104 mg/dL (04 Mar 2021 07:23)  POCT Blood Glucose.: 99 mg/dL (03 Mar 2021 21:04)  POCT Blood Glucose.: 101 mg/dL (03 Mar 2021 17:02)  POCT Blood Glucose.: 114 mg/dL (03 Mar 2021 11:52)        RADIOLOGY & ADDITIONAL TESTS:    Consultant(s) Notes Reviewed:  [x ] YES  [ ] NO    PHYSICAL EXAM:  GENERAL: well built, well nourished  HEAD:  Atraumatic, Normocephalic  EYES: EOMI, PERRLA, conjunctiva and sclera clear  ENT: No tonsillar erythema, exudates, or enlargement; Moist mucous membranes, Good dentition, No lesions  NECK: Supple, No JVD, Normal thyroid, no enlarged nodes  NERVOUS SYSTEM:  Alert & Oriented X3, Good concentration; Motor Strength 5/5 B/L upper and lower extremities; DTRs 2+ intact and symmetric, sensory intact  CHEST/LUNG: B/L good air entry; No rales, rhonchi, or wheezing  HEART: S1S2 normal, no S3, Regular rate and rhythm; No murmurs, rubs, or gallops  ABDOMEN: Soft, Nontender, Nondistended; Bowel sounds present  EXTREMITIES:  2+ Peripheral Pulses, No clubbing, cyanosis,  edema improved  LYMPH: No lymphadenopathy noted  SKIN: No rashes or lesions    Care Discussed with Consultants/Other Providers [ x] YES  [ ] NO

## 2021-03-04 NOTE — PROGRESS NOTE ADULT - ASSESSMENT
75 yr old male fom NH, H/O CVA recent/dementia/HTN/BPH, admit hospital for unresponsive, intubated at ER, CT chest/ABD showed PNA/aspiration(?)/blood culture positive, start pressor for septic shock//IV ABS, F/U pan culture result, ICU care, close monitor lab, titrate pressor, elevated LFT, shock liver, close monitor LFT  stable, S/P extubation, tolerated po diet, PT eval, D/C planning to [facility], pending autho from insurance.

## 2021-03-04 NOTE — PROGRESS NOTE ADULT - PROBLEM SELECTOR PLAN 1
-secondary to aspiration PNA  -currently on RA, continue BIPAP at night  -completed ABT   ID Dr. Sosa

## 2021-03-04 NOTE — PROGRESS NOTE ADULT - SUBJECTIVE AND OBJECTIVE BOX
Time of Visit:  Patient seen and examined.     MEDICATIONS  (STANDING):  aspirin  chewable 81 milliGRAM(s) Oral daily  atorvastatin 80 milliGRAM(s) Oral at bedtime  chlorhexidine 2% Cloths 1 Application(s) Topical <User Schedule>  dextrose 50% Injectable 25 Gram(s) IV Push once  enoxaparin Injectable 120 milliGRAM(s) SubCutaneous two times a day  insulin lispro (ADMELOG) corrective regimen sliding scale   SubCutaneous Before meals and at bedtime  metoprolol succinate ER 25 milliGRAM(s) Oral daily      MEDICATIONS  (PRN):  acetaminophen    Suspension .. 650 milliGRAM(s) Oral every 6 hours PRN Temp greater or equal to 38C (100.4F), Mild Pain (1 - 3)  artificial  tears Solution 1 Drop(s) Both EYES every 4 hours PRN Dry Eyes  guaiFENesin   Syrup  (Sugar-Free) 100 milliGRAM(s) Oral every 4 hours PRN Cough       Medications up to date at time of exam.      PHYSICAL EXAMINATION:  Patient has no new complaints.  GENERAL: The patient is a well-developed, well-nourished, in no apparent distress.     Vital Signs Last 24 Hrs  T(C): 36.8 (04 Mar 2021 14:00), Max: 36.9 (03 Mar 2021 20:13)  T(F): 98.3 (04 Mar 2021 14:00), Max: 98.5 (03 Mar 2021 20:13)  HR: 92 (04 Mar 2021 15:09) (92 - 103)  BP: 131/97 (04 Mar 2021 15:09) (125/100 - 149/89)  BP(mean): --  RR: 19 (04 Mar 2021 14:00) (14 - 19)  SpO2: 99% (04 Mar 2021 14:00) (95% - 100%)   (if applicable)    Chest Tube (if applicable)    HEENT: Head is normocephalic and atraumatic. Extraocular muscles are intact. Mucous membranes are moist.     NECK: Supple, no palpable adenopathy.    LUNGS: Clear to auscultation, no wheezing, rales, or rhonchi.    HEART: Regular rate and rhythm without murmur.    ABDOMEN: Soft, nontender, and nondistended.  No hepatosplenomegaly is noted.    : No painful voiding, no pelvic pain    EXTREMITIES: Without any cyanosis, clubbing, rash, lesions or edema.    NEUROLOGIC: Awake, alert, oriented, grossly intact    SKIN: Warm, dry, good turgor.      LABS:                        14.3   6.92  )-----------( 229      ( 04 Mar 2021 10:39 )             43.3     03-04    150<H>  |  116<H>  |  31<H>  ----------------------------<  119<H>  3.7   |  28  |  0.95    Ca    9.1      04 Mar 2021 10:39    TPro  6.8  /  Alb  2.6<L>  /  TBili  0.5  /  DBili  x   /  AST  28  /  ALT  64<H>  /  AlkPhos  137<H>  03-03                        MICROBIOLOGY: (if applicable)    RADIOLOGY & ADDITIONAL STUDIES:  EKG:   CXR:  ECHO:    IMPRESSION: 75y Male PAST MEDICAL & SURGICAL HISTORY:  BPH (benign prostatic hyperplasia)    Hyperlipidemia    Hypertension    Diabetes    Stroke  7/2020 , 1/2021    S/P cholecystectomy     p/w         75 [facility] M [facility], non ambulatory with PMhx of CVA( twice first in July 2020, second 3 weeks ago), HTN, HLD, Prediabetes, BPH was brought in from NH for unresponsiveness. Patient was recently discharged to rehab after he was found to have stroke. Pt was unresponsive so was intubated in The ED and Femoral line was placed by the ED physician. Patient was admitted to ICU for hypoxic respiratory failure and septic shock 2/2 UTI . He was spiking high grade fever, lactate and CXR were normal. Blood and urine cultures were sent in ED followed by Vancomycin and zosyn empiric dosing..   Urinalysis was positive. Patient was start on Pressor support and IV Meropenem  for broad spectrum coverage. Blood cultures were growing Enterococci.. Later antibiotics were de-escalated to Rocephin as urine cultures were growing pan sensitive Klebsiella.  Repeat cultures came back negative  Patient's pressor support progressively decreased, after a few trials of SBT, Patient was extubated on 2/26 after a successful SBT. He is was started on Midodrine 5mg Q8 to wean off the pressors. He had a jackson catheter placed on admission.   Patient is back to his baseline mental status as per the wife, AAOx0-1 due to multiple CVAs and dementia, also as per outpatient PCP. he can not follow all the commands, likely has a language barrier as well.  Due to concerns for worsening increasing tachypnea after intubation, patient was placed on nocturnal BIPAP on 2/26 for hypoxia and as a precaution since he was recently extubated.            Assessment and Plan:    Problem/Plan - 1:  ·  Problem: Acute respiratory failure with hypoxia.  Plan: found unresponsive in NH  required intubation -- s/p extubation on 2/26  currently on RA, continue BIPAP at night  likely du to HCAP  off antibx      Problem/Plan - 2:  ·  Problem: HCAP (healthcare-associated pneumonia).  Plan.s/p  antibx as per ID     Problem/Plan - 3:  ·  Problem: Sepsis due to Klebsiella.  Plan: Urine culture grew klebsiella  resolved sepsis  s/p  ceftriaxone till 3/3 8pm last dose.

## 2021-03-04 NOTE — PROGRESS NOTE ADULT - ASSESSMENT
Patient is a 76 Y/O Male from [facility] rehab, non ambulatory with PMhx of CVA( twice first in July 2020, second 3 weeks ago), HTN, HLD, Prediabetes, BPH was brought in from NH for lethargy that lead to unresponsiveness. As per wife Polish speaking(  [id number]) Patient confused and had memory loss after the stroke 3 weeks ago. Patient admitted to ICU for Acute hypoxic Respiratory Failure and Septic shock secondary to aspiration PNA.

## 2021-03-05 DIAGNOSIS — E87.0 HYPEROSMOLALITY AND HYPERNATREMIA: ICD-10-CM

## 2021-03-05 LAB
ANION GAP SERPL CALC-SCNC: 4 MMOL/L — LOW (ref 5–17)
BUN SERPL-MCNC: 33 MG/DL — HIGH (ref 7–18)
CALCIUM SERPL-MCNC: 8.8 MG/DL — SIGNIFICANT CHANGE UP (ref 8.4–10.5)
CHLORIDE SERPL-SCNC: 114 MMOL/L — HIGH (ref 96–108)
CO2 SERPL-SCNC: 31 MMOL/L — SIGNIFICANT CHANGE UP (ref 22–31)
CREAT SERPL-MCNC: 1.33 MG/DL — HIGH (ref 0.5–1.3)
GLUCOSE BLDC GLUCOMTR-MCNC: 106 MG/DL — HIGH (ref 70–99)
GLUCOSE BLDC GLUCOMTR-MCNC: 110 MG/DL — HIGH (ref 70–99)
GLUCOSE BLDC GLUCOMTR-MCNC: 86 MG/DL — SIGNIFICANT CHANGE UP (ref 70–99)
GLUCOSE BLDC GLUCOMTR-MCNC: 98 MG/DL — SIGNIFICANT CHANGE UP (ref 70–99)
GLUCOSE SERPL-MCNC: 126 MG/DL — HIGH (ref 70–99)
HCT VFR BLD CALC: 42.9 % — SIGNIFICANT CHANGE UP (ref 39–50)
HGB BLD-MCNC: 13.9 G/DL — SIGNIFICANT CHANGE UP (ref 13–17)
MCHC RBC-ENTMCNC: 31.2 PG — SIGNIFICANT CHANGE UP (ref 27–34)
MCHC RBC-ENTMCNC: 32.4 GM/DL — SIGNIFICANT CHANGE UP (ref 32–36)
MCV RBC AUTO: 96.4 FL — SIGNIFICANT CHANGE UP (ref 80–100)
NRBC # BLD: 0 /100 WBCS — SIGNIFICANT CHANGE UP (ref 0–0)
PLATELET # BLD AUTO: 233 K/UL — SIGNIFICANT CHANGE UP (ref 150–400)
POTASSIUM SERPL-MCNC: 3.6 MMOL/L — SIGNIFICANT CHANGE UP (ref 3.5–5.3)
POTASSIUM SERPL-SCNC: 3.6 MMOL/L — SIGNIFICANT CHANGE UP (ref 3.5–5.3)
RBC # BLD: 4.45 M/UL — SIGNIFICANT CHANGE UP (ref 4.2–5.8)
RBC # FLD: 11.9 % — SIGNIFICANT CHANGE UP (ref 10.3–14.5)
SODIUM SERPL-SCNC: 149 MMOL/L — HIGH (ref 135–145)
WBC # BLD: 6.98 K/UL — SIGNIFICANT CHANGE UP (ref 3.8–10.5)
WBC # FLD AUTO: 6.98 K/UL — SIGNIFICANT CHANGE UP (ref 3.8–10.5)

## 2021-03-05 RX ADMIN — ENOXAPARIN SODIUM 120 MILLIGRAM(S): 100 INJECTION SUBCUTANEOUS at 17:45

## 2021-03-05 RX ADMIN — ENOXAPARIN SODIUM 120 MILLIGRAM(S): 100 INJECTION SUBCUTANEOUS at 05:29

## 2021-03-05 RX ADMIN — CHLORHEXIDINE GLUCONATE 1 APPLICATION(S): 213 SOLUTION TOPICAL at 05:29

## 2021-03-05 RX ADMIN — Medication 25 MILLIGRAM(S): at 05:29

## 2021-03-05 RX ADMIN — Medication 81 MILLIGRAM(S): at 12:45

## 2021-03-05 RX ADMIN — ATORVASTATIN CALCIUM 80 MILLIGRAM(S): 80 TABLET, FILM COATED ORAL at 21:46

## 2021-03-05 NOTE — PROGRESS NOTE ADULT - SUBJECTIVE AND OBJECTIVE BOX
Time of Visit:  Patient seen and examined.     MEDICATIONS  (STANDING):  aspirin  chewable 81 milliGRAM(s) Oral daily  atorvastatin 80 milliGRAM(s) Oral at bedtime  chlorhexidine 2% Cloths 1 Application(s) Topical <User Schedule>  dextrose 50% Injectable 25 Gram(s) IV Push once  enoxaparin Injectable 120 milliGRAM(s) SubCutaneous two times a day  insulin lispro (ADMELOG) corrective regimen sliding scale   SubCutaneous Before meals and at bedtime  metoprolol succinate ER 25 milliGRAM(s) Oral daily      MEDICATIONS  (PRN):  acetaminophen    Suspension .. 650 milliGRAM(s) Oral every 6 hours PRN Temp greater or equal to 38C (100.4F), Mild Pain (1 - 3)  artificial  tears Solution 1 Drop(s) Both EYES every 4 hours PRN Dry Eyes  guaiFENesin   Syrup  (Sugar-Free) 100 milliGRAM(s) Oral every 4 hours PRN Cough       Medications up to date at time of exam.      PHYSICAL EXAMINATION:  Patient has no new complaints.  GENERAL: The patient is a well-developed, well-nourished, in no apparent distress.     Vital Signs Last 24 Hrs  T(C): 36.1 (05 Mar 2021 13:30), Max: 37.2 (04 Mar 2021 19:52)  T(F): 97 (05 Mar 2021 13:30), Max: 99 (04 Mar 2021 19:52)  HR: 97 (05 Mar 2021 13:30) (91 - 102)  BP: 146/80 (05 Mar 2021 13:30) (133/80 - 150/98)  BP(mean): --  RR: 18 (05 Mar 2021 13:30) (18 - 20)  SpO2: 99% (05 Mar 2021 13:30) (97% - 100%)   (if applicable)    Chest Tube (if applicable)    HEENT: Head is normocephalic and atraumatic. Extraocular muscles are intact. Mucous membranes are moist.     NECK: Supple, no palpable adenopathy.    LUNGS: Clear to auscultation, no wheezing, rales, or rhonchi.    HEART: Regular rate and rhythm without murmur.    ABDOMEN: Soft, nontender, and nondistended.  No hepatosplenomegaly is noted.    : No painful voiding, no pelvic pain    EXTREMITIES: Without any cyanosis, clubbing, rash, lesions or edema.    NEUROLOGIC: Awake, alert, oriented, grossly intact    SKIN: Warm, dry, good turgor.      LABS:                        13.9   6.98  )-----------( 233      ( 05 Mar 2021 08:19 )             42.9     03-05    149<H>  |  114<H>  |  33<H>  ----------------------------<  126<H>  3.6   |  31  |  1.33<H>    Ca    8.8      05 Mar 2021 08:19                          MICROBIOLOGY: (if applicable)    RADIOLOGY & ADDITIONAL STUDIES:  EKG:   CXR:  ECHO:    IMPRESSION: 75y Male PAST MEDICAL & SURGICAL HISTORY:  BPH (benign prostatic hyperplasia)    Hyperlipidemia    Hypertension    Diabetes    Stroke  7/2020 , 1/2021    S/P cholecystectomy     p/w         75 Y M from [facility], non ambulatory with PMhx of CVA( twice first in July 2020, second 3 weeks ago), HTN, HLD, Prediabetes, BPH was brought in from NH for unresponsiveness. Patient was recently discharged to rehab after he was found to have stroke. Pt was unresponsive so was intubated in The ED and Femoral line was placed by the ED physician. Patient was admitted to ICU for hypoxic respiratory failure and septic shock 2/2 UTI . He was spiking high grade fever, lactate and CXR were normal. Blood and urine cultures were sent in ED followed by Vancomycin and zosyn empiric dosing..   Urinalysis was positive. Patient was start on Pressor support and IV Meropenem  for broad spectrum coverage. Blood cultures were growing Enterococci.. Later antibiotics were de-escalated to Rocephin as urine cultures were growing pan sensitive Klebsiella.  Repeat cultures came back negative  Patient's pressor support progressively decreased, after a few trials of SBT, Patient was extubated on 2/26 after a successful SBT. He is was started on Midodrine 5mg Q8 to wean off the pressors. He had a jackson catheter placed on admission.   Patient is back to his baseline mental status as per the wife, AAOx0-1 due to multiple CVAs and dementia, also as per outpatient PCP. he can not follow all the commands, likely has a language barrier as well.  Due to concerns for worsening increasing tachypnea after intubation, patient was placed on nocturnal BIPAP on 2/26 for hypoxia and as a precaution since he was recently extubated.            Assessment and Plan:    Problem/Plan - 1:  ·  Problem: Acute respiratory failure with hypoxia.  Plan: found unresponsive in NH  required intubation -- s/p extubation on 2/26  currently on RA, continue BIPAP at night  likely du to HCAP  off antibx      Problem/Plan - 2:  ·  Problem: HCAP (healthcare-associated pneumonia).  Plan.s/p  antibx as per ID     Problem/Plan - 3:  ·  Problem: Sepsis due to Klebsiella.  Plan: Urine culture grew klebsiella  resolved sepsis  s/p  ceftriaxone till 3/3 8pm last dose.       For d/c planning to [facility] pending insurance auth

## 2021-03-05 NOTE — PROGRESS NOTE ADULT - ASSESSMENT
75 yr old male fom NH, H/O CVA recent/dementia/HTN/BPH, admit hospital for unresponsive, intubated at ER, CT chest/ABD showed PNA/aspiration(?)/blood culture positive, start pressor for septic shock//IV ABS, F/U pan culture result, ICU care, close monitor lab, titrate pressor, elevated LFT, shock liver, close monitor LFT  stable, S/P extubation, tolerated po diet, PT eval, D/C planning to rehab,  pending autho from insurance.

## 2021-03-05 NOTE — PROGRESS NOTE ADULT - ASSESSMENT
Patient is a 74 Y/O Male from [facility] rehab, non ambulatory with PMhx of CVA( twice first in July 2020, second 3 weeks ago), HTN, HLD, Prediabetes, BPH was brought in from NH for lethargy that lead to unresponsiveness. As per wife Polish speaking(  [id number]) Patient confused and had memory loss after the stroke 3 weeks ago. Patient admitted to ICU for Acute hypoxic Respiratory Failure and Septic shock secondary to aspiration PNA.

## 2021-03-05 NOTE — PROGRESS NOTE ADULT - SUBJECTIVE AND OBJECTIVE BOX
NP Note discussed with  Primary Attending    Patient is a 75y old  Male who presents with a chief complaint of Unresponsive (05 Mar 2021 11:15)      INTERVAL HPI/OVERNIGHT EVENTS: Patient seen and examined at bedside, no new complaints    MEDICATIONS  (STANDING):  aspirin  chewable 81 milliGRAM(s) Oral daily  atorvastatin 80 milliGRAM(s) Oral at bedtime  chlorhexidine 2% Cloths 1 Application(s) Topical <User Schedule>  dextrose 50% Injectable 25 Gram(s) IV Push once  enoxaparin Injectable 120 milliGRAM(s) SubCutaneous two times a day  insulin lispro (ADMELOG) corrective regimen sliding scale   SubCutaneous Before meals and at bedtime  metoprolol succinate ER 25 milliGRAM(s) Oral daily    MEDICATIONS  (PRN):  acetaminophen    Suspension .. 650 milliGRAM(s) Oral every 6 hours PRN Temp greater or equal to 38C (100.4F), Mild Pain (1 - 3)  artificial  tears Solution 1 Drop(s) Both EYES every 4 hours PRN Dry Eyes  guaiFENesin   Syrup  (Sugar-Free) 100 milliGRAM(s) Oral every 4 hours PRN Cough      __________________________________________________  REVIEW OF SYSTEMS:    unable to assess, poor historian      Vital Signs Last 24 Hrs  T(C): 36.9 (05 Mar 2021 05:14), Max: 37.2 (04 Mar 2021 19:52)  T(F): 98.5 (05 Mar 2021 05:14), Max: 99 (04 Mar 2021 19:52)  HR: 91 (05 Mar 2021 08:41) (91 - 103)  BP: 133/80 (05 Mar 2021 05:14) (125/100 - 150/98)  BP(mean): --  RR: 20 (05 Mar 2021 05:14) (19 - 20)  SpO2: 99% (05 Mar 2021 08:41) (97% - 100%)    ________________________________________________  PHYSICAL EXAM:  GENERAL: NAD  HEENT: Normocephalic;  conjunctivae and sclerae clear; moist mucous membranes;   NECK : supple  CHEST/LUNG: Clear to auscultation bilaterally with good air entry   HEART: S1 S2  regular; no murmurs, gallops or rubs  ABDOMEN: obese, Soft, Nontender, Nondistended; Bowel sounds present  EXTREMITIES: no cyanosis; no edema; no calf tenderness  SKIN: warm and dry; no rash  NERVOUS SYSTEM:  confused    _________________________________________________  LABS:                        13.9   6.98  )-----------( 233      ( 05 Mar 2021 08:19 )             42.9     03-05    149<H>  |  114<H>  |  33<H>  ----------------------------<  126<H>  3.6   |  31  |  1.33<H>    Ca    8.8      05 Mar 2021 08:19          CAPILLARY BLOOD GLUCOSE      POCT Blood Glucose.: 106 mg/dL (05 Mar 2021 11:45)  POCT Blood Glucose.: 110 mg/dL (05 Mar 2021 08:20)  POCT Blood Glucose.: 107 mg/dL (04 Mar 2021 21:38)  POCT Blood Glucose.: 112 mg/dL (04 Mar 2021 17:25)        RADIOLOGY & ADDITIONAL TESTS:  < from: Xray Chest 1 View- PORTABLE-Routine (Xray Chest 1 View- PORTABLE-Routine in AM.) (02.26.21 @ 09:18) >    EXAM:  XR CHEST PORTABLE ROUTINE 1V                            PROCEDURE DATE:  02/26/2021          INTERPRETATION:  CLINICAL STATEMENT: Follow-up chest pain.    TECHNIQUE: AP view of the chest.    COMPARISON: 2/25/2021    FINDINGS/  IMPRESSION:  ET tube, feeding tube, left central line again noted. No pneumothorax.    Improving left pleural effusion. Better aeration right lung base.    Heart size cannot be accurately assessed in this projection.    < end of copied text >  < from: US Hepatic & Pancreatic (02.21.21 @ 21:44) >    EXAM:  US LIVER AND PANCREAS                            PROCEDURE DATE:  02/21/2021          INTERPRETATION:  CLINICAL INFORMATION: Elevated transaminitis.    TECHNIQUE: Sonographic images of the abdomen was performed.    COMPARISON: CT of the abdomen and pelvis dated 2/19/2021    FINDINGS:    Evaluation is limited due to overlying bowel gas.    LIVER: Within normal limits.  GALLBLADDER: Not visualized.  CBD: Not visualized.  PANCREAS: Evaluation of the pancreas is limited secondary to overlying bowel gas.  RIGHT KIDNEY: Evaluation of the right kidney is limited secondary to overlying bowel gas.  AORTA AND IVC: Visualized portions of the IVC and aorta are unremarkable.    IMPRESSION: Limited study. Nonvisualization of the gallbladder.    < end of copied text >  < from: CT Chest No Cont (02.19.21 @ 02:44) >  EXAM:  CT ABDOMEN AND PELVIS                          EXAM:  CT CHEST                            PROCEDURE DATE:  02/19/2021          INTERPRETATION:  CLINICAL INFORMATION:  Fever.    COMPARISON: None.    PROCEDURE:  CT of the Chest, Abdomen and Pelvis was performed without intravenous contrast.  Intravenous contrast: None.  Oral contrast: None.  Sagittal and coronal reformats were performed.  Postprocessed chest MIP reformatted images were created and reviewed.    FINDINGS:    CHEST:    LINES AND TUBES: Endotracheal tube and endogastric tube appear in place.  LUNGS AND LARGE AIRWAYS: Patent central airways. Right middle lobe as well as patchy bibasilar opacities. Findings nonspecific, likely represent developing pneumonia or aspiration.  PLEURA: No pleural effusion.  VESSELS: Atherosclerotic disease of the aorta and its branches.  HEART: Trace pericardial effusion and/or thickening with mild cardiomegaly. Coronary artery calcifications.  MEDIASTINUM AND JESSY: No lymphadenopathy.  CHESTWALL AND LOWER NECK: Bilateral gynecomastia.    ABDOMEN AND PELVIS:    LIVER: Enlarged measuring up to 20 cm in length.  BILE DUCTS: No significant dilatation.  GALLBLADDER: Cholecystectomy.  SPLEEN: Within normal limits.  PANCREAS: Within normal limits.  ADRENALS: Within normal limits.  KIDNEYS/URETERS: No hydronephrosis. Punctate nonobstructive stone in the upper pole of left kidney. Trace nonspecific bilateral perinephric stranding. 1.6 cm right renal cyst of higher attenuation than simple fluid. Consider nonemergent correlation with renal ultrasound.    BLADDER: Decompressed containing Garcia catheter.  REPRODUCTIVE ORGANS: Prominent prostate measuring up to 5.6 cm in transverse length.    BOWEL: No bowel obstruction. Colonic diverticulosis without acute diverticulitis. Appendix is not visualized without secondary findings of acute appendicitis.  PERITONEUM: No ascites.  VESSELS:  Atherosclerotic disease of the aorta and its branches.  RETROPERITONEUM: No lymphadenopathy.  ABDOMINAL WALL: A small fat containing umbilical and bilateral groin hernia is noted. Right groin femoral catheter identified.  BONES: Multilevel degenerative changes and scoliosis. Indeterminate sclerotic focus in T4 vertebral body.    IMPRESSION:    Right middle lobe as well as patchy bibasilar opacities. Findings nonspecific, likely represent developing pneumonia or aspiration.    No acute bowel inflammatory changes or obstruction.    No hydronephrosis. Punctate nonobstructive stone in the upper pole of left kidney. Trace nonspecific bilateral perinephric stranding.    1.6 cm right renal cyst of higher attenuation than simple fluid. Consider nonemergent correlation with renal ultrasound.    Additional findings as mentioned above.      < end of copied text >  < from: CT Head No Cont (02.19.21 @ 02:44) >    EXAM:  CT BRAIN                            PROCEDURE DATE:  02/19/2021          INTERPRETATION:  HISTORY: Altered mental status.    COMPARISON: None.    TECHNIQUE: Axial noncontrast CT images from the skull base to the vertex were obtained and submitted for interpretation. Coronal and sagittal reformatted images were performed. Bone and soft tissue windows were evaluated.    FINDINGS:    There is no acute intracranial hemorrhage, midline shift, or abnormal extra-axial fluid collection.    Age-indeterminate left occipital and posterior mesial temporal infarction with loss of gray-white differentiation in the left posterior cerebral artery (PCA) territory is present.    Chronic left cerebellar infarction with ex vacuo dilatation of the fourthventricle.    Patchy and confluent regions of periventricular and deep cerebral white matter hypoattenuation due to chronic microangiopathic ischemic changes. Atheromatous calcifications along the carotid siphons are present.    Mild centrally predominant cerebral volume loss noted.  No evidence of hydrocephalus. Basal cisterns are patent.    Bilateral ethmoid and maxillary sinus mucosal thickening bilateral maxillary sinus polyps and retention cyst noted. Nasopharyngeal secretions likely due to indwelling support devices. Mastoid air cells are clear. Calvarium is intact.    Endotracheal and nasogastric tubes are partially imaged.    IMPRESSION:    Age-indeterminate left PCA territory infarction. Consider MRI for further evaluation.    No acute intracranial bleeding.  Chronic left cerebellar infarction.      < end of copied text >      Imaging  Reviewed:  YES    Consultant(s) Notes Reviewed:   YES    Plan of care was discussed with patient and /or primary care giver; all questions and concerns were addressed

## 2021-03-05 NOTE — PROGRESS NOTE ADULT - SUBJECTIVE AND OBJECTIVE BOX
Patient is a 75y old  Male who presents with a chief complaint of Unresponsive (04 Mar 2021 17:56)/metabolic encephalopathy/septic shock/aspiration HCAP/UTI/URSULA/shock liver/hypernatremia/intubation/extubation, pending auth discharge to NH      INTERVAL HPI/OVERNIGHT EVENTS:  T(C): 36.9 (03-05-21 @ 05:14), Max: 37.2 (03-04-21 @ 19:52)  HR: 91 (03-05-21 @ 08:41) (91 - 103)  BP: 133/80 (03-05-21 @ 05:14) (125/100 - 150/98)  RR: 20 (03-05-21 @ 05:14) (19 - 20)  SpO2: 99% (03-05-21 @ 08:41) (97% - 100%)  Wt(kg): --    LABS:                        13.9   6.98  )-----------( 233      ( 05 Mar 2021 08:19 )             42.9     03-05    149<H>  |  114<H>  |  33<H>  ----------------------------<  126<H>  3.6   |  31  |  1.33<H>    Ca    8.8      05 Mar 2021 08:19          CAPILLARY BLOOD GLUCOSE      POCT Blood Glucose.: 110 mg/dL (05 Mar 2021 08:20)  POCT Blood Glucose.: 107 mg/dL (04 Mar 2021 21:38)  POCT Blood Glucose.: 112 mg/dL (04 Mar 2021 17:25)  POCT Blood Glucose.: 90 mg/dL (04 Mar 2021 11:45)        RADIOLOGY & ADDITIONAL TESTS:    Consultant(s) Notes Reviewed:  [x ] YES  [ ] NO    PHYSICAL EXAM:  GENERAL: well built, well nourished  HEAD:  Atraumatic, Normocephalic  EYES: EOMI, PERRLA, conjunctiva and sclera clear  ENT: No tonsillar erythema, exudates, or enlargement; Moist mucous membranes, Good dentition, No lesions  NECK: Supple, No JVD, Normal thyroid, no enlarged nodes  NERVOUS SYSTEM:  Alert & Oriented X3, Good concentration; Motor Strength 5/5 B/L upper and lower extremities; DTRs 2+ intact and symmetric, sensory intact  CHEST/LUNG: B/L good air entry; No rales, rhonchi, or wheezing  HEART: S1S2 normal, no S3, Regular rate and rhythm; No murmurs, rubs, or gallops  ABDOMEN: Soft, Nontender, Nondistended; Bowel sounds present  EXTREMITIES:  2+ Peripheral Pulses, No clubbing, cyanosis, less  edema  LYMPH: No lymphadenopathy noted  SKIN: No rashes or lesions    Care Discussed with Consultants/Other Providers [ x] YES  [ ] NO

## 2021-03-06 LAB
ANION GAP SERPL CALC-SCNC: 6 MMOL/L — SIGNIFICANT CHANGE UP (ref 5–17)
BUN SERPL-MCNC: 32 MG/DL — HIGH (ref 7–18)
CALCIUM SERPL-MCNC: 9.3 MG/DL — SIGNIFICANT CHANGE UP (ref 8.4–10.5)
CHLORIDE SERPL-SCNC: 116 MMOL/L — HIGH (ref 96–108)
CO2 SERPL-SCNC: 28 MMOL/L — SIGNIFICANT CHANGE UP (ref 22–31)
CREAT SERPL-MCNC: 1.2 MG/DL — SIGNIFICANT CHANGE UP (ref 0.5–1.3)
GLUCOSE BLDC GLUCOMTR-MCNC: 150 MG/DL — HIGH (ref 70–99)
GLUCOSE BLDC GLUCOMTR-MCNC: 83 MG/DL — SIGNIFICANT CHANGE UP (ref 70–99)
GLUCOSE BLDC GLUCOMTR-MCNC: 93 MG/DL — SIGNIFICANT CHANGE UP (ref 70–99)
GLUCOSE BLDC GLUCOMTR-MCNC: 95 MG/DL — SIGNIFICANT CHANGE UP (ref 70–99)
GLUCOSE SERPL-MCNC: 101 MG/DL — HIGH (ref 70–99)
HCT VFR BLD CALC: 44.5 % — SIGNIFICANT CHANGE UP (ref 39–50)
HGB BLD-MCNC: 14 G/DL — SIGNIFICANT CHANGE UP (ref 13–17)
MAGNESIUM SERPL-MCNC: 2.2 MG/DL — SIGNIFICANT CHANGE UP (ref 1.6–2.6)
MCHC RBC-ENTMCNC: 30.5 PG — SIGNIFICANT CHANGE UP (ref 27–34)
MCHC RBC-ENTMCNC: 31.5 GM/DL — LOW (ref 32–36)
MCV RBC AUTO: 96.9 FL — SIGNIFICANT CHANGE UP (ref 80–100)
NRBC # BLD: 0 /100 WBCS — SIGNIFICANT CHANGE UP (ref 0–0)
PHOSPHATE SERPL-MCNC: 3.8 MG/DL — SIGNIFICANT CHANGE UP (ref 2.5–4.5)
PLATELET # BLD AUTO: 218 K/UL — SIGNIFICANT CHANGE UP (ref 150–400)
POTASSIUM SERPL-MCNC: 3.7 MMOL/L — SIGNIFICANT CHANGE UP (ref 3.5–5.3)
POTASSIUM SERPL-SCNC: 3.7 MMOL/L — SIGNIFICANT CHANGE UP (ref 3.5–5.3)
RBC # BLD: 4.59 M/UL — SIGNIFICANT CHANGE UP (ref 4.2–5.8)
RBC # FLD: 12 % — SIGNIFICANT CHANGE UP (ref 10.3–14.5)
SODIUM SERPL-SCNC: 150 MMOL/L — HIGH (ref 135–145)
WBC # BLD: 6.05 K/UL — SIGNIFICANT CHANGE UP (ref 3.8–10.5)
WBC # FLD AUTO: 6.05 K/UL — SIGNIFICANT CHANGE UP (ref 3.8–10.5)

## 2021-03-06 RX ADMIN — CHLORHEXIDINE GLUCONATE 1 APPLICATION(S): 213 SOLUTION TOPICAL at 06:17

## 2021-03-06 RX ADMIN — ENOXAPARIN SODIUM 120 MILLIGRAM(S): 100 INJECTION SUBCUTANEOUS at 17:52

## 2021-03-06 RX ADMIN — ENOXAPARIN SODIUM 120 MILLIGRAM(S): 100 INJECTION SUBCUTANEOUS at 06:17

## 2021-03-06 RX ADMIN — ATORVASTATIN CALCIUM 80 MILLIGRAM(S): 80 TABLET, FILM COATED ORAL at 21:52

## 2021-03-06 RX ADMIN — Medication 81 MILLIGRAM(S): at 13:19

## 2021-03-06 RX ADMIN — Medication 25 MILLIGRAM(S): at 06:17

## 2021-03-06 NOTE — PROGRESS NOTE ADULT - ASSESSMENT
Patient is a 76 Y/O Male from [facility] rehab, non ambulatory with PMhx of CVA( twice first in July 2020, second 3 weeks ago), HTN, HLD, Prediabetes, BPH was brought in from NH for lethargy that lead to unresponsiveness. As per wife Polish speaking(  [id number]) Patient confused and had memory loss after the stroke 3 weeks ago. Patient admitted to ICU for Acute hypoxic Respiratory Failure and Septic shock secondary to aspiration PNA.

## 2021-03-06 NOTE — PROGRESS NOTE ADULT - PROBLEM SELECTOR PLAN 6
BMI 41  continue diet as dietitian rec  -physical mobility limited due to stroke
BMI 41  continue diet as dietitian rec  -physical mobility limited due to stroke
BMI 41  with hx of CVA x 2 times, HTN/ dementia  continue diet as dietitian rec
resolved
BMI 41  with hx of CVA x 2 times, HTN/ dementia  continue diet as dietitian rec
BMI 41  with hx of CVA x 2 times, HTN/ dementia  continue diet as dietitian rec

## 2021-03-06 NOTE — PROGRESS NOTE ADULT - PROBLEM SELECTOR PROBLEM 2
Hypernatremia
HCAP (healthcare-associated pneumonia)
HCAP (healthcare-associated pneumonia)
Hypernatremia
Sepsis due to Klebsiella
HCAP (healthcare-associated pneumonia)

## 2021-03-06 NOTE — PROGRESS NOTE ADULT - PROBLEM SELECTOR PROBLEM 3
Sepsis due to Klebsiella
Sepsis due to Klebsiella
Stroke
Sepsis due to Klebsiella

## 2021-03-06 NOTE — PROGRESS NOTE ADULT - PROBLEM SELECTOR PLAN 2
see plan as above
see plan as above
Urine culture grew klebsiella  resolved sepsis  completed ceftriaxone
-Sodium 149  -increase PO fluids  -likely to decreased PO intake  -IVF held due to risk of fluid overload
see plan as above
-Sodium 149  -increase PO fluids  -likely to decreased PO intake  -IVF held due to risk of fluid overload

## 2021-03-06 NOTE — PROGRESS NOTE ADULT - SUBJECTIVE AND OBJECTIVE BOX
Time of Visit:  Patient seen and examined.     MEDICATIONS  (STANDING):  aspirin  chewable 81 milliGRAM(s) Oral daily  atorvastatin 80 milliGRAM(s) Oral at bedtime  chlorhexidine 2% Cloths 1 Application(s) Topical <User Schedule>  dextrose 50% Injectable 25 Gram(s) IV Push once  enoxaparin Injectable 120 milliGRAM(s) SubCutaneous two times a day  insulin lispro (ADMELOG) corrective regimen sliding scale   SubCutaneous Before meals and at bedtime  metoprolol succinate ER 25 milliGRAM(s) Oral daily      MEDICATIONS  (PRN):  acetaminophen    Suspension .. 650 milliGRAM(s) Oral every 6 hours PRN Temp greater or equal to 38C (100.4F), Mild Pain (1 - 3)  artificial  tears Solution 1 Drop(s) Both EYES every 4 hours PRN Dry Eyes  guaiFENesin   Syrup  (Sugar-Free) 100 milliGRAM(s) Oral every 4 hours PRN Cough       Medications up to date at time of exam.      PHYSICAL EXAMINATION:  Patient has no new complaints.  GENERAL: The patient is a well-developed, well-nourished, in no apparent distress.     Vital Signs Last 24 Hrs  T(C): 36.7 (06 Mar 2021 12:55), Max: 36.7 (06 Mar 2021 05:01)  T(F): 98 (06 Mar 2021 12:55), Max: 98 (06 Mar 2021 05:01)  HR: 94 (06 Mar 2021 12:55) (94 - 103)  BP: 121/77 (06 Mar 2021 12:55) (104/70 - 136/85)  BP(mean): --  RR: 19 (06 Mar 2021 12:55) (18 - 19)  SpO2: 98% (06 Mar 2021 12:55) (96% - 100%)   (if applicable)    Chest Tube (if applicable)    HEENT: Head is normocephalic and atraumatic. Extraocular muscles are intact. Mucous membranes are moist.     NECK: Supple, no palpable adenopathy.    LUNGS: Clear to auscultation, no wheezing, rales, or rhonchi.    HEART: Regular rate and rhythm without murmur.    ABDOMEN: Soft, nontender, and nondistended.  No hepatosplenomegaly is noted.    : No painful voiding, no pelvic pain    EXTREMITIES: Without any cyanosis, clubbing, rash, lesions or edema.    NEUROLOGIC: Awake,     SKIN: Warm, dry, good turgor.      LABS:                        14.0   6.05  )-----------( 218      ( 06 Mar 2021 06:39 )             44.5     03-06    150<H>  |  116<H>  |  32<H>  ----------------------------<  101<H>  3.7   |  28  |  1.20    Ca    9.3      06 Mar 2021 06:39  Phos  3.8     03-06  Mg     2.2     03-06                          MICROBIOLOGY: (if applicable)    RADIOLOGY & ADDITIONAL STUDIES:  EKG:   CXR:  ECHO:    IMPRESSION: 75y Male PAST MEDICAL & SURGICAL HISTORY:  BPH (benign prostatic hyperplasia)    Hyperlipidemia    Hypertension    Diabetes    Stroke  7/2020 , 1/2021    S/P cholecystectomy     p/w           75 Y M from [facility], non ambulatory with PMhx of CVA( twice first in July 2020, second 3 weeks ago), HTN, HLD, Prediabetes, BPH was brought in from NH for unresponsiveness. Patient was recently discharged to rehab after he was found to have stroke. Pt was unresponsive so was intubated in The ED and Femoral line was placed by the ED physician. Patient was admitted to ICU for hypoxic respiratory failure and septic shock 2/2 UTI . He was spiking high grade fever, lactate and CXR were normal. Blood and urine cultures were sent in ED followed by Vancomycin and zosyn empiric dosing..   Urinalysis was positive. Patient was start on Pressor support and IV Meropenem  for broad spectrum coverage. Blood cultures were growing Enterococci.. Later antibiotics were de-escalated to Rocephin as urine cultures were growing pan sensitive Klebsiella.  Repeat cultures came back negative  Patient's pressor support progressively decreased, after a few trials of SBT, Patient was extubated on 2/26 after a successful SBT. He is was started on Midodrine 5mg Q8 to wean off the pressors. He had a jackson catheter placed on admission.   Patient is back to his baseline mental status as per the wife, AAOx0-1 due to multiple CVAs and dementia, also as per outpatient PCP. he can not follow all the commands, likely has a language barrier as well.  Due to concerns for worsening increasing tachypnea after intubation, patient was placed on nocturnal BIPAP on 2/26 for hypoxia and as a precaution since he was recently extubated.            Assessment and Plan:    Problem/Plan - 1:  ·  Problem: Acute respiratory failure with hypoxia.  Plan: found unresponsive in NH  required intubation -- s/p extubation on 2/26  currently on RA, continue BIPAP at night  likely du to HCAP  off antibx      Problem/Plan - 2:  ·  Problem: HCAP (healthcare-associated pneumonia).  Plan.s/p  antibx as per ID     Problem/Plan - 3:  ·  Problem: Sepsis due to Klebsiella.  Plan: Urine culture grew klebsiella  resolved sepsis  s/p  ceftriaxone till 3/3 8pm last dose.       For d/c planning to [facility] pending insurance auth

## 2021-03-07 LAB
ALBUMIN SERPL ELPH-MCNC: 2.6 G/DL — LOW (ref 3.5–5)
ALP SERPL-CCNC: 114 U/L — SIGNIFICANT CHANGE UP (ref 40–120)
ALT FLD-CCNC: 54 U/L DA — SIGNIFICANT CHANGE UP (ref 10–60)
ANION GAP SERPL CALC-SCNC: 4 MMOL/L — LOW (ref 5–17)
AST SERPL-CCNC: 40 U/L — SIGNIFICANT CHANGE UP (ref 10–40)
BASOPHILS # BLD AUTO: 0.02 K/UL — SIGNIFICANT CHANGE UP (ref 0–0.2)
BASOPHILS NFR BLD AUTO: 0.4 % — SIGNIFICANT CHANGE UP (ref 0–2)
BILIRUB SERPL-MCNC: 0.6 MG/DL — SIGNIFICANT CHANGE UP (ref 0.2–1.2)
BUN SERPL-MCNC: 32 MG/DL — HIGH (ref 7–18)
CALCIUM SERPL-MCNC: 9.4 MG/DL — SIGNIFICANT CHANGE UP (ref 8.4–10.5)
CHLORIDE SERPL-SCNC: 115 MMOL/L — HIGH (ref 96–108)
CO2 SERPL-SCNC: 31 MMOL/L — SIGNIFICANT CHANGE UP (ref 22–31)
CREAT SERPL-MCNC: 1.19 MG/DL — SIGNIFICANT CHANGE UP (ref 0.5–1.3)
EOSINOPHIL # BLD AUTO: 0.42 K/UL — SIGNIFICANT CHANGE UP (ref 0–0.5)
EOSINOPHIL NFR BLD AUTO: 7.5 % — HIGH (ref 0–6)
GLUCOSE BLDC GLUCOMTR-MCNC: 110 MG/DL — HIGH (ref 70–99)
GLUCOSE BLDC GLUCOMTR-MCNC: 122 MG/DL — HIGH (ref 70–99)
GLUCOSE BLDC GLUCOMTR-MCNC: 95 MG/DL — SIGNIFICANT CHANGE UP (ref 70–99)
GLUCOSE BLDC GLUCOMTR-MCNC: 95 MG/DL — SIGNIFICANT CHANGE UP (ref 70–99)
GLUCOSE SERPL-MCNC: 102 MG/DL — HIGH (ref 70–99)
HCT VFR BLD CALC: 42.3 % — SIGNIFICANT CHANGE UP (ref 39–50)
HGB BLD-MCNC: 13.5 G/DL — SIGNIFICANT CHANGE UP (ref 13–17)
IMM GRANULOCYTES NFR BLD AUTO: 0.4 % — SIGNIFICANT CHANGE UP (ref 0–1.5)
LYMPHOCYTES # BLD AUTO: 1.93 K/UL — SIGNIFICANT CHANGE UP (ref 1–3.3)
LYMPHOCYTES # BLD AUTO: 34.5 % — SIGNIFICANT CHANGE UP (ref 13–44)
MAGNESIUM SERPL-MCNC: 2.3 MG/DL — SIGNIFICANT CHANGE UP (ref 1.6–2.6)
MCHC RBC-ENTMCNC: 30.9 PG — SIGNIFICANT CHANGE UP (ref 27–34)
MCHC RBC-ENTMCNC: 31.9 GM/DL — LOW (ref 32–36)
MCV RBC AUTO: 96.8 FL — SIGNIFICANT CHANGE UP (ref 80–100)
MONOCYTES # BLD AUTO: 0.51 K/UL — SIGNIFICANT CHANGE UP (ref 0–0.9)
MONOCYTES NFR BLD AUTO: 9.1 % — SIGNIFICANT CHANGE UP (ref 2–14)
NEUTROPHILS # BLD AUTO: 2.7 K/UL — SIGNIFICANT CHANGE UP (ref 1.8–7.4)
NEUTROPHILS NFR BLD AUTO: 48.1 % — SIGNIFICANT CHANGE UP (ref 43–77)
NRBC # BLD: 0 /100 WBCS — SIGNIFICANT CHANGE UP (ref 0–0)
PHOSPHATE SERPL-MCNC: 3.7 MG/DL — SIGNIFICANT CHANGE UP (ref 2.5–4.5)
PLATELET # BLD AUTO: 204 K/UL — SIGNIFICANT CHANGE UP (ref 150–400)
POTASSIUM SERPL-MCNC: 4.2 MMOL/L — SIGNIFICANT CHANGE UP (ref 3.5–5.3)
POTASSIUM SERPL-SCNC: 4.2 MMOL/L — SIGNIFICANT CHANGE UP (ref 3.5–5.3)
PROT SERPL-MCNC: 6.8 G/DL — SIGNIFICANT CHANGE UP (ref 6–8.3)
RBC # BLD: 4.37 M/UL — SIGNIFICANT CHANGE UP (ref 4.2–5.8)
RBC # FLD: 11.9 % — SIGNIFICANT CHANGE UP (ref 10.3–14.5)
SARS-COV-2 RNA SPEC QL NAA+PROBE: SIGNIFICANT CHANGE UP
SODIUM SERPL-SCNC: 150 MMOL/L — HIGH (ref 135–145)
WBC # BLD: 5.6 K/UL — SIGNIFICANT CHANGE UP (ref 3.8–10.5)
WBC # FLD AUTO: 5.6 K/UL — SIGNIFICANT CHANGE UP (ref 3.8–10.5)

## 2021-03-07 RX ADMIN — CHLORHEXIDINE GLUCONATE 1 APPLICATION(S): 213 SOLUTION TOPICAL at 06:19

## 2021-03-07 RX ADMIN — Medication 25 MILLIGRAM(S): at 06:19

## 2021-03-07 RX ADMIN — ATORVASTATIN CALCIUM 80 MILLIGRAM(S): 80 TABLET, FILM COATED ORAL at 21:09

## 2021-03-07 RX ADMIN — Medication 81 MILLIGRAM(S): at 12:05

## 2021-03-07 RX ADMIN — ENOXAPARIN SODIUM 120 MILLIGRAM(S): 100 INJECTION SUBCUTANEOUS at 06:20

## 2021-03-07 RX ADMIN — ENOXAPARIN SODIUM 120 MILLIGRAM(S): 100 INJECTION SUBCUTANEOUS at 17:12

## 2021-03-07 NOTE — PROGRESS NOTE ADULT - SUBJECTIVE AND OBJECTIVE BOX
Patient is a 75y old  Male who presents with a chief complaint of Unresponsive (06 Mar 2021 20:06)/septic shock/metabolic encephalopathy/aspiration HCAP/UTI/URSULA/hypernatremia/shock liver/intubation/extubation, pending autho from insurance  D/C rehab      INTERVAL HPI/OVERNIGHT EVENTS:  T(C): 36.8 (03-07-21 @ 05:47), Max: 36.9 (03-06-21 @ 20:34)  HR: 87 (03-07-21 @ 05:51) (87 - 98)  BP: 138/94 (03-07-21 @ 05:47) (119/62 - 138/94)  RR: 18 (03-07-21 @ 05:47) (18 - 19)  SpO2: 99% (03-07-21 @ 05:51) (96% - 100%)  Wt(kg): --    LABS:                        13.5   5.60  )-----------( 204      ( 07 Mar 2021 08:14 )             42.3     03-07    150<H>  |  115<H>  |  32<H>  ----------------------------<  102<H>  x    |  31  |  1.19    Ca    9.4      07 Mar 2021 08:14  Phos  3.7     03-07  Mg     2.2     03-06    TPro  6.8  /  Alb  2.6<L>  /  TBili  0.6  /  DBili  x   /  AST  x   /  ALT  54  /  AlkPhos  114  03-07        CAPILLARY BLOOD GLUCOSE      POCT Blood Glucose.: 95 mg/dL (07 Mar 2021 07:34)  POCT Blood Glucose.: 93 mg/dL (06 Mar 2021 21:06)  POCT Blood Glucose.: 150 mg/dL (06 Mar 2021 16:42)  POCT Blood Glucose.: 83 mg/dL (06 Mar 2021 11:46)        RADIOLOGY & ADDITIONAL TESTS:    Consultant(s) Notes Reviewed:  [x ] YES  [ ] NO    PHYSICAL EXAM:  GENERAL: well built, well nourished  HEAD:  Atraumatic, Normocephalic  EYES: EOMI, PERRLA, conjunctiva and sclera clear  ENT: No tonsillar erythema, exudates, or enlargement; Moist mucous membranes, Good dentition, No lesions  NECK: Supple, No JVD, Normal thyroid, no enlarged nodes  NERVOUS SYSTEM:  Alert & Oriented X3, Good concentration; Motor Strength 5/5 B/L upper and lower extremities; DTRs 2+ intact and symmetric, sensory intact  CHEST/LUNG: B/L good air entry; No rales, rhonchi, or wheezing  HEART: S1S2 normal, no S3, Regular rate and rhythm; No murmurs, rubs, or gallops  ABDOMEN: Soft, Nontender, Nondistended; Bowel sounds present  EXTREMITIES:  2+ Peripheral Pulses, No clubbing, cyanosis, mild  edema  LYMPH: No lymphadenopathy noted  SKIN: No rashes or lesions    Care Discussed with Consultants/Other Providers [ x] YES  [ ] NO

## 2021-03-07 NOTE — PROGRESS NOTE ADULT - SUBJECTIVE AND OBJECTIVE BOX
Time of Visit:  Patient seen and examined.     MEDICATIONS  (STANDING):  aspirin  chewable 81 milliGRAM(s) Oral daily  atorvastatin 80 milliGRAM(s) Oral at bedtime  chlorhexidine 2% Cloths 1 Application(s) Topical <User Schedule>  dextrose 50% Injectable 25 Gram(s) IV Push once  enoxaparin Injectable 120 milliGRAM(s) SubCutaneous two times a day  insulin lispro (ADMELOG) corrective regimen sliding scale   SubCutaneous Before meals and at bedtime  metoprolol succinate ER 25 milliGRAM(s) Oral daily      MEDICATIONS  (PRN):  acetaminophen    Suspension .. 650 milliGRAM(s) Oral every 6 hours PRN Temp greater or equal to 38C (100.4F), Mild Pain (1 - 3)  artificial  tears Solution 1 Drop(s) Both EYES every 4 hours PRN Dry Eyes  guaiFENesin   Syrup  (Sugar-Free) 100 milliGRAM(s) Oral every 4 hours PRN Cough       Medications up to date at time of exam.      PHYSICAL EXAMINATION:  Patient has no new complaints.  GENERAL: The patient is a well-developed, well-nourished, in no apparent distress.     Vital Signs Last 24 Hrs  T(C): 36.2 (07 Mar 2021 14:10), Max: 36.9 (06 Mar 2021 20:34)  T(F): 97.1 (07 Mar 2021 14:10), Max: 98.5 (06 Mar 2021 20:34)  HR: 98 (07 Mar 2021 14:10) (87 - 98)  BP: 130/84 (07 Mar 2021 14:10) (119/62 - 138/94)  BP(mean): --  RR: 18 (07 Mar 2021 14:10) (18 - 18)  SpO2: 96% (07 Mar 2021 14:10) (96% - 100%)   (if applicable)    Chest Tube (if applicable)    HEENT: Head is normocephalic and atraumatic. Extraocular muscles are intact. Mucous membranes are moist.     NECK: Supple, no palpable adenopathy.    LUNGS: Clear to auscultation, no wheezing, rales, or rhonchi.    HEART: Regular rate and rhythm without murmur.    ABDOMEN: Soft, nontender, and nondistended.  No hepatosplenomegaly is noted.    : No painful voiding, no pelvic pain    EXTREMITIES: Without any cyanosis, clubbing, rash, lesions or edema.    NEUROLOGIC: Awake,, do not follow command s    SKIN: Warm, dry, good turgor.      LABS:                        13.5   5.60  )-----------( 204      ( 07 Mar 2021 08:14 )             42.3     03-07    150<H>  |  115<H>  |  32<H>  ----------------------------<  102<H>  4.2   |  31  |  1.19    Ca    9.4      07 Mar 2021 08:14  Phos  3.7     03-07  Mg     2.3     03-07    TPro  6.8  /  Alb  2.6<L>  /  TBili  0.6  /  DBili  x   /  AST  40  /  ALT  54  /  AlkPhos  114  03-07                        MICROBIOLOGY: (if applicable)    RADIOLOGY & ADDITIONAL STUDIES:  EKG:   CXR:  ECHO:    IMPRESSION: 75y Male PAST MEDICAL & SURGICAL HISTORY:  BPH (benign prostatic hyperplasia)    Hyperlipidemia    Hypertension    Diabetes    Stroke  7/2020 , 1/2021    S/P cholecystectomy     p/w           75 Y M from [facility], non ambulatory with PMhx of CVA( twice first in July 2020, second 3 weeks ago), HTN, HLD, Prediabetes, BPH was brought in from NH for unresponsiveness. Patient was recently discharged to rehab after he was found to have stroke. Pt was unresponsive so was intubated in The ED and Femoral line was placed by the ED physician. Patient was admitted to ICU for hypoxic respiratory failure and septic shock 2/2 UTI . He was spiking high grade fever, lactate and CXR were normal. Blood and urine cultures were sent in ED followed by Vancomycin and zosyn empiric dosing..   Urinalysis was positive. Patient was start on Pressor support and IV Meropenem  for broad spectrum coverage. Blood cultures were growing Enterococci.. Later antibiotics were de-escalated to Rocephin as urine cultures were growing pan sensitive Klebsiella.  Repeat cultures came back negative  Patient's pressor support progressively decreased, after a few trials of SBT, Patient was extubated on 2/26 after a successful SBT. He is was started on Midodrine 5mg Q8 to wean off the pressors. He had a jackson catheter placed on admission.   Patient is back to his baseline mental status as per the wife, AAOx0-1 due to multiple CVAs and dementia, also as per outpatient PCP. he can not follow all the commands, likely has a language barrier as well.  Due to concerns for worsening increasing tachypnea after intubation, patient was placed on nocturnal BIPAP on 2/26 for hypoxia and as a precaution since he was recently extubated.            Assessment and Plan:    Problem/Plan - 1:  ·  Problem: Acute respiratory failure with hypoxia.  Plan: found unresponsive in NH  required intubation -- s/p extubation on 2/26  currently on RA, continue BIPAP at night  likely du to HCAP  off antibx      Problem/Plan - 2:  ·  Problem: HCAP (healthcare-associated pneumonia).  Plan.s/p  antibx as per ID     Problem/Plan - 3:  ·  Problem: Sepsis due to Klebsiella.  Plan: Urine culture grew klebsiella  resolved sepsis  s/p  ceftriaxone till 3/3 8pm last dose.       For d/c planning to [facility] pending insurance auth

## 2021-03-07 NOTE — PROGRESS NOTE ADULT - ASSESSMENT
75 yr old male fom NH, H/O CVA recent/dementia/HTN/BPH, admit hospital for unresponsive, intubated at ER, CT chest/ABD showed PNA/aspiration(?)/blood culture positive, start pressor for septic shock//IV ABS, F/U pan culture result, ICU care, close monitor lab, titrate pressor, elevated LFT, shock liver, close monitor LFT  stable, S/P extubation, tolerated po diet, PT eval, D/C planning to rehab,  pending autho from insurance. Fall precaution,  follow up for D/C to rehab

## 2021-03-08 ENCOUNTER — TRANSCRIPTION ENCOUNTER (OUTPATIENT)
Age: 76
End: 2021-03-08

## 2021-03-08 VITALS
SYSTOLIC BLOOD PRESSURE: 127 MMHG | DIASTOLIC BLOOD PRESSURE: 79 MMHG | HEART RATE: 87 BPM | OXYGEN SATURATION: 95 % | RESPIRATION RATE: 18 BRPM | TEMPERATURE: 99 F

## 2021-03-08 LAB
ALBUMIN SERPL ELPH-MCNC: 2.4 G/DL — LOW (ref 3.5–5)
ALP SERPL-CCNC: 110 U/L — SIGNIFICANT CHANGE UP (ref 40–120)
ALT FLD-CCNC: 54 U/L DA — SIGNIFICANT CHANGE UP (ref 10–60)
ANION GAP SERPL CALC-SCNC: 9 MMOL/L — SIGNIFICANT CHANGE UP (ref 5–17)
AST SERPL-CCNC: 31 U/L — SIGNIFICANT CHANGE UP (ref 10–40)
BASOPHILS # BLD AUTO: 0.03 K/UL — SIGNIFICANT CHANGE UP (ref 0–0.2)
BASOPHILS NFR BLD AUTO: 0.6 % — SIGNIFICANT CHANGE UP (ref 0–2)
BILIRUB SERPL-MCNC: 0.6 MG/DL — SIGNIFICANT CHANGE UP (ref 0.2–1.2)
BUN SERPL-MCNC: 29 MG/DL — HIGH (ref 7–18)
CALCIUM SERPL-MCNC: 8.8 MG/DL — SIGNIFICANT CHANGE UP (ref 8.4–10.5)
CHLORIDE SERPL-SCNC: 111 MMOL/L — HIGH (ref 96–108)
CO2 SERPL-SCNC: 25 MMOL/L — SIGNIFICANT CHANGE UP (ref 22–31)
CREAT SERPL-MCNC: 1.12 MG/DL — SIGNIFICANT CHANGE UP (ref 0.5–1.3)
EOSINOPHIL # BLD AUTO: 0.36 K/UL — SIGNIFICANT CHANGE UP (ref 0–0.5)
EOSINOPHIL NFR BLD AUTO: 7.1 % — HIGH (ref 0–6)
GLUCOSE BLDC GLUCOMTR-MCNC: 108 MG/DL — HIGH (ref 70–99)
GLUCOSE BLDC GLUCOMTR-MCNC: 93 MG/DL — SIGNIFICANT CHANGE UP (ref 70–99)
GLUCOSE SERPL-MCNC: 94 MG/DL — SIGNIFICANT CHANGE UP (ref 70–99)
HCT VFR BLD CALC: 43.5 % — SIGNIFICANT CHANGE UP (ref 39–50)
HGB BLD-MCNC: 14 G/DL — SIGNIFICANT CHANGE UP (ref 13–17)
IMM GRANULOCYTES NFR BLD AUTO: 0.4 % — SIGNIFICANT CHANGE UP (ref 0–1.5)
LYMPHOCYTES # BLD AUTO: 2.27 K/UL — SIGNIFICANT CHANGE UP (ref 1–3.3)
LYMPHOCYTES # BLD AUTO: 44.9 % — HIGH (ref 13–44)
MAGNESIUM SERPL-MCNC: 2.1 MG/DL — SIGNIFICANT CHANGE UP (ref 1.6–2.6)
MCHC RBC-ENTMCNC: 31.8 PG — SIGNIFICANT CHANGE UP (ref 27–34)
MCHC RBC-ENTMCNC: 32.2 GM/DL — SIGNIFICANT CHANGE UP (ref 32–36)
MCV RBC AUTO: 98.9 FL — SIGNIFICANT CHANGE UP (ref 80–100)
MONOCYTES # BLD AUTO: 0.39 K/UL — SIGNIFICANT CHANGE UP (ref 0–0.9)
MONOCYTES NFR BLD AUTO: 7.7 % — SIGNIFICANT CHANGE UP (ref 2–14)
NEUTROPHILS # BLD AUTO: 1.99 K/UL — SIGNIFICANT CHANGE UP (ref 1.8–7.4)
NEUTROPHILS NFR BLD AUTO: 39.3 % — LOW (ref 43–77)
NRBC # BLD: 0 /100 WBCS — SIGNIFICANT CHANGE UP (ref 0–0)
PHOSPHATE SERPL-MCNC: 3.6 MG/DL — SIGNIFICANT CHANGE UP (ref 2.5–4.5)
PLATELET # BLD AUTO: 183 K/UL — SIGNIFICANT CHANGE UP (ref 150–400)
POTASSIUM SERPL-MCNC: 3.8 MMOL/L — SIGNIFICANT CHANGE UP (ref 3.5–5.3)
POTASSIUM SERPL-SCNC: 3.8 MMOL/L — SIGNIFICANT CHANGE UP (ref 3.5–5.3)
PROT SERPL-MCNC: 6.5 G/DL — SIGNIFICANT CHANGE UP (ref 6–8.3)
RBC # BLD: 4.4 M/UL — SIGNIFICANT CHANGE UP (ref 4.2–5.8)
RBC # FLD: 12.1 % — SIGNIFICANT CHANGE UP (ref 10.3–14.5)
SODIUM SERPL-SCNC: 145 MMOL/L — SIGNIFICANT CHANGE UP (ref 135–145)
WBC # BLD: 5.06 K/UL — SIGNIFICANT CHANGE UP (ref 3.8–10.5)
WBC # FLD AUTO: 5.06 K/UL — SIGNIFICANT CHANGE UP (ref 3.8–10.5)

## 2021-03-08 RX ORDER — METOPROLOL TARTRATE 50 MG
1 TABLET ORAL
Qty: 0 | Refills: 0 | DISCHARGE

## 2021-03-08 RX ORDER — METOPROLOL TARTRATE 50 MG
1 TABLET ORAL
Qty: 0 | Refills: 0 | DISCHARGE
Start: 2021-03-08

## 2021-03-08 RX ORDER — ROSUVASTATIN CALCIUM 5 MG/1
1 TABLET ORAL
Qty: 0 | Refills: 0 | DISCHARGE

## 2021-03-08 RX ORDER — LOSARTAN POTASSIUM 100 MG/1
1 TABLET, FILM COATED ORAL
Qty: 0 | Refills: 0 | DISCHARGE

## 2021-03-08 RX ORDER — ACETAMINOPHEN 500 MG
20.31 TABLET ORAL
Qty: 0 | Refills: 0 | DISCHARGE
Start: 2021-03-08

## 2021-03-08 RX ORDER — ISOSORBIDE MONONITRATE 60 MG/1
1 TABLET, EXTENDED RELEASE ORAL
Qty: 0 | Refills: 0 | DISCHARGE

## 2021-03-08 RX ORDER — LANOLIN ALCOHOL/MO/W.PET/CERES
1 CREAM (GRAM) TOPICAL
Qty: 0 | Refills: 0 | DISCHARGE

## 2021-03-08 RX ORDER — ASPIRIN/CALCIUM CARB/MAGNESIUM 324 MG
1 TABLET ORAL
Qty: 0 | Refills: 0 | DISCHARGE

## 2021-03-08 RX ORDER — ASPIRIN/CALCIUM CARB/MAGNESIUM 324 MG
1 TABLET ORAL
Qty: 0 | Refills: 0 | DISCHARGE
Start: 2021-03-08

## 2021-03-08 RX ORDER — TAMSULOSIN HYDROCHLORIDE 0.4 MG/1
1 CAPSULE ORAL
Qty: 0 | Refills: 0 | DISCHARGE

## 2021-03-08 RX ORDER — ATORVASTATIN CALCIUM 80 MG/1
1 TABLET, FILM COATED ORAL
Qty: 0 | Refills: 0 | DISCHARGE
Start: 2021-03-08

## 2021-03-08 RX ADMIN — Medication 81 MILLIGRAM(S): at 11:28

## 2021-03-08 RX ADMIN — Medication 25 MILLIGRAM(S): at 05:36

## 2021-03-08 RX ADMIN — CHLORHEXIDINE GLUCONATE 1 APPLICATION(S): 213 SOLUTION TOPICAL at 05:36

## 2021-03-08 RX ADMIN — ENOXAPARIN SODIUM 120 MILLIGRAM(S): 100 INJECTION SUBCUTANEOUS at 05:36

## 2021-03-08 NOTE — DISCHARGE NOTE NURSING/CASE MANAGEMENT/SOCIAL WORK - PATIENT PORTAL LINK FT
You can access the FollowMyHealth Patient Portal offered by [facility] by registering at the following website: [email or web address]. By joining [facility]’s FollowMyHealth portal, you will also be able to view your health information using other applications (apps) compatible with our system.

## 2021-03-08 NOTE — PROGRESS NOTE ADULT - REASON FOR ADMISSION
Unresponsive

## 2021-03-08 NOTE — PROGRESS NOTE ADULT - SUBJECTIVE AND OBJECTIVE BOX
Time of Visit:  Patient seen and examined.     MEDICATIONS  (STANDING):  aspirin  chewable 81 milliGRAM(s) Oral daily  atorvastatin 80 milliGRAM(s) Oral at bedtime  chlorhexidine 2% Cloths 1 Application(s) Topical <User Schedule>  dextrose 50% Injectable 25 Gram(s) IV Push once  enoxaparin Injectable 120 milliGRAM(s) SubCutaneous two times a day  insulin lispro (ADMELOG) corrective regimen sliding scale   SubCutaneous Before meals and at bedtime  metoprolol succinate ER 25 milliGRAM(s) Oral daily      MEDICATIONS  (PRN):  acetaminophen    Suspension .. 650 milliGRAM(s) Oral every 6 hours PRN Temp greater or equal to 38C (100.4F), Mild Pain (1 - 3)  artificial  tears Solution 1 Drop(s) Both EYES every 4 hours PRN Dry Eyes  guaiFENesin   Syrup  (Sugar-Free) 100 milliGRAM(s) Oral every 4 hours PRN Cough       Medications up to date at time of exam.    ROS; No fever, chills, cough, congestion on exam.   PHYSICAL EXAMINATION:  Vital Signs Last 24 Hrs  T(C): 36.3 (08 Mar 2021 05:15), Max: 36.5 (07 Mar 2021 20:39)  T(F): 97.4 (08 Mar 2021 05:15), Max: 97.7 (07 Mar 2021 20:39)  HR: 84 (08 Mar 2021 08:27) (81 - 98)  BP: 131/79 (08 Mar 2021 05:15) (128/88 - 131/79)  BP(mean): --  RR: 20 (08 Mar 2021 05:15) (18 - 20)  SpO2: 96% (08 Mar 2021 08:27) (96% - 100%)   (if applicable)    General: Non verbal . Responsive to tactile stimuli. No acute distress. Morbid Obese.     HEENT: Head is normocephalic and atraumatic. No nasal tenderness.. Mucous membranes are moist.     NECK: Supple, no palpable adenopathy.    LUNGS: Clear to auscultation bilaterally with no wheezing, rales, or rhonchi.    HEART: S1 S2 Regular rate and no click/ rub .    ABDOMEN: Soft, nontender, and nondistended.  Active bowel sounds.      EXTREMITIES: Total care. Non ambulatory.     NEUROLOGIC: Cognitive impaired.      SKIN: Warm and moist . Non diaphoretic.       LABS:                        14.0   5.06  )-----------( 183      ( 08 Mar 2021 07:30 )             43.5     03-08    145  |  111<H>  |  29<H>  ----------------------------<  94  3.8   |  25  |  1.12    Ca    8.8      08 Mar 2021 07:30  Phos  3.6     03-08  Mg     2.1     03-08    TPro  6.5  /  Alb  2.4<L>  /  TBili  0.6  /  DBili  x   /  AST  31  /  ALT  54  /  AlkPhos  110  03-08    MICROBIOLOGY: (if applicable)    RADIOLOGY & ADDITIONAL STUDIES:  EKG:   CXR:  ECHO:    IMPRESSION: 76y Male PAST MEDICAL & SURGICAL HISTORY:  BPH (benign prostatic hyperplasia)    Hyperlipidemia    Hypertension    Diabetes    Stroke  7/2020 , 1/2021    S/P cholecystectomy     Impression: 75 Y/O Male presented from Nursing Home with Lethargy and Unresponsiveness. Was admitted in ICU due to Acute Hypoxic Respiratory Failure and Septic Shock secondary to Aspiration Pneumonia which he completed ABT. Got intubated and extubated on 02-26-21 and so far saturating good room air and On Bipap at Night.  Negative RVP, Blood Cx x 2 , Nasal swab Negative for Covid 19 on 03-07-21.     Suggestion:  O2 saturation 95% room air on exam. On Nocturnal Bipap.   Pulmonary oral hygiene care.  Aspiration precautions with HOB especially during meal times.   DVT/ GI prophylactic.

## 2021-03-08 NOTE — PROGRESS NOTE ADULT - NSHPATTENDINGPLANDISCUSS_GEN_ALL_CORE
NP/nurse
resident doctor/nurse
NP/nurse
NP/nurse
resident doctor/nurse
resident doctor/nurse
NP/nurse
resident doctor/nurse
NP/nurse
NP/nurse
resident doctor/nurse
icu team on rounds

## 2021-03-08 NOTE — PROGRESS NOTE ADULT - PROVIDER SPECIALTY LIST ADULT
Critical Care
Critical Care
Infectious Disease
Internal Medicine
Pulmonology
Critical Care
Infectious Disease
Internal Medicine
Pulmonology
Pulmonology
Critical Care
Infectious Disease
Internal Medicine
Pulmonology
Critical Care
Internal Medicine
MICU
Pulmonology
Internal Medicine

## 2021-03-08 NOTE — PROGRESS NOTE ADULT - SUBJECTIVE AND OBJECTIVE BOX
Patient is a 76y old  Male who presents with a chief complaint of Unresponsive (07 Mar 2021 20:22)/septic shock/metabolic encephalopathy/aspiration HCAP/UTI/intubation/extubation/URSULA/shock liver, patient improved, D/C rehab today, F/U lab at NH.      INTERVAL HPI/OVERNIGHT EVENTS:  T(C): 36.3 (03-08-21 @ 05:15), Max: 36.5 (03-07-21 @ 20:39)  HR: 84 (03-08-21 @ 08:27) (81 - 98)  BP: 131/79 (03-08-21 @ 05:15) (128/88 - 131/79)  RR: 20 (03-08-21 @ 05:15) (18 - 20)  SpO2: 96% (03-08-21 @ 08:27) (96% - 100%)  Wt(kg): --    LABS:                        14.0   5.06  )-----------( 183      ( 08 Mar 2021 07:30 )             43.5     03-08    145  |  111<H>  |  29<H>  ----------------------------<  94  3.8   |  25  |  1.12    Ca    8.8      08 Mar 2021 07:30  Phos  3.6     03-08  Mg     2.1     03-08    TPro  6.5  /  Alb  2.4<L>  /  TBili  0.6  /  DBili  x   /  AST  31  /  ALT  54  /  AlkPhos  110  03-08        CAPILLARY BLOOD GLUCOSE      POCT Blood Glucose.: 93 mg/dL (08 Mar 2021 07:56)  POCT Blood Glucose.: 110 mg/dL (07 Mar 2021 20:56)  POCT Blood Glucose.: 122 mg/dL (07 Mar 2021 16:58)  POCT Blood Glucose.: 95 mg/dL (07 Mar 2021 11:38)        RADIOLOGY & ADDITIONAL TESTS:    Consultant(s) Notes Reviewed:  [x ] YES  [ ] NO    PHYSICAL EXAM:  GENERAL: well built, well nourished  HEAD:  Atraumatic, Normocephalic  EYES: EOMI, PERRLA, conjunctiva and sclera clear  ENT: No tonsillar erythema, exudates, or enlargement; Moist mucous membranes, Good dentition, No lesions  NECK: Supple, No JVD, Normal thyroid, no enlarged nodes  NERVOUS SYSTEM:  Awake mild confused  CHEST/LUNG: B/L good air entry; No rales, rhonchi, or wheezing  HEART: S1S2 normal, no S3, Regular rate and rhythm; No murmurs, rubs, or gallops  ABDOMEN: Soft, Nontender, Nondistended; Bowel sounds present  EXTREMITIES:  2+ Peripheral Pulses, No clubbing, cyanosis, improved  LYMPH: No lymphadenopathy noted  SKIN: No rashes or lesions    Care Discussed with Consultants/Other Providers [ x] YES  [ ] NO

## 2021-03-08 NOTE — PROGRESS NOTE ADULT - ASSESSMENT
75 yr old male fom NH, H/O CVA recent/dementia/HTN/BPH, admit hospital for unresponsive, intubated at ER, CT chest/ABD showed PNA/aspiration(?)/blood culture positive, start pressor for septic shock//IV ABS, F/U pan culture result, ICU care, close monitor lab, titrate pressor, elevated LFT, shock liver, close monitor LFT  stable, S/P extubation, tolerated po diet, PT eval, D/C planning to rehab today, F/U lab at NH.

## 2021-03-16 PROCEDURE — 93306 TTE W/DOPPLER COMPLETE: CPT

## 2021-03-16 PROCEDURE — 80053 COMPREHEN METABOLIC PANEL: CPT

## 2021-03-16 PROCEDURE — 85730 THROMBOPLASTIN TIME PARTIAL: CPT

## 2021-03-16 PROCEDURE — 83605 ASSAY OF LACTIC ACID: CPT

## 2021-03-16 PROCEDURE — 71045 X-RAY EXAM CHEST 1 VIEW: CPT

## 2021-03-16 PROCEDURE — 76705 ECHO EXAM OF ABDOMEN: CPT

## 2021-03-16 PROCEDURE — 94003 VENT MGMT INPAT SUBQ DAY: CPT

## 2021-03-16 PROCEDURE — 99285 EMERGENCY DEPT VISIT HI MDM: CPT

## 2021-03-16 PROCEDURE — 81001 URINALYSIS AUTO W/SCOPE: CPT

## 2021-03-16 PROCEDURE — 86803 HEPATITIS C AB TEST: CPT

## 2021-03-16 PROCEDURE — 83735 ASSAY OF MAGNESIUM: CPT

## 2021-03-16 PROCEDURE — P9047: CPT

## 2021-03-16 PROCEDURE — 84100 ASSAY OF PHOSPHORUS: CPT

## 2021-03-16 PROCEDURE — 85025 COMPLETE CBC W/AUTO DIFF WBC: CPT

## 2021-03-16 PROCEDURE — 92610 EVALUATE SWALLOWING FUNCTION: CPT

## 2021-03-16 PROCEDURE — 36415 COLL VENOUS BLD VENIPUNCTURE: CPT

## 2021-03-16 PROCEDURE — 80061 LIPID PANEL: CPT

## 2021-03-16 PROCEDURE — 82607 VITAMIN B-12: CPT

## 2021-03-16 PROCEDURE — U0005: CPT

## 2021-03-16 PROCEDURE — 82962 GLUCOSE BLOOD TEST: CPT

## 2021-03-16 PROCEDURE — 87640 STAPH A DNA AMP PROBE: CPT

## 2021-03-16 PROCEDURE — 70450 CT HEAD/BRAIN W/O DYE: CPT

## 2021-03-16 PROCEDURE — 93005 ELECTROCARDIOGRAM TRACING: CPT

## 2021-03-16 PROCEDURE — 84484 ASSAY OF TROPONIN QUANT: CPT

## 2021-03-16 PROCEDURE — 97530 THERAPEUTIC ACTIVITIES: CPT

## 2021-03-16 PROCEDURE — 97163 PT EVAL HIGH COMPLEX 45 MIN: CPT

## 2021-03-16 PROCEDURE — 87040 BLOOD CULTURE FOR BACTERIA: CPT

## 2021-03-16 PROCEDURE — 84145 PROCALCITONIN (PCT): CPT

## 2021-03-16 PROCEDURE — 82306 VITAMIN D 25 HYDROXY: CPT

## 2021-03-16 PROCEDURE — 94660 CPAP INITIATION&MGMT: CPT

## 2021-03-16 PROCEDURE — 84443 ASSAY THYROID STIM HORMONE: CPT

## 2021-03-16 PROCEDURE — 80048 BASIC METABOLIC PNL TOTAL CA: CPT

## 2021-03-16 PROCEDURE — 96374 THER/PROPH/DIAG INJ IV PUSH: CPT

## 2021-03-16 PROCEDURE — 86769 SARS-COV-2 COVID-19 ANTIBODY: CPT

## 2021-03-16 PROCEDURE — 82803 BLOOD GASES ANY COMBINATION: CPT

## 2021-03-16 PROCEDURE — 87150 DNA/RNA AMPLIFIED PROBE: CPT

## 2021-03-16 PROCEDURE — 87086 URINE CULTURE/COLONY COUNT: CPT

## 2021-03-16 PROCEDURE — 87077 CULTURE AEROBIC IDENTIFY: CPT

## 2021-03-16 PROCEDURE — 85027 COMPLETE CBC AUTOMATED: CPT

## 2021-03-16 PROCEDURE — 85610 PROTHROMBIN TIME: CPT

## 2021-03-16 PROCEDURE — 87635 SARS-COV-2 COVID-19 AMP PRB: CPT

## 2021-03-16 PROCEDURE — 84478 ASSAY OF TRIGLYCERIDES: CPT

## 2021-03-16 PROCEDURE — 94002 VENT MGMT INPAT INIT DAY: CPT

## 2021-03-16 PROCEDURE — 74176 CT ABD & PELVIS W/O CONTRAST: CPT

## 2021-03-16 PROCEDURE — 92526 ORAL FUNCTION THERAPY: CPT

## 2021-03-16 PROCEDURE — 87186 SC STD MICRODIL/AGAR DIL: CPT

## 2021-03-16 PROCEDURE — 87641 MR-STAPH DNA AMP PROBE: CPT

## 2021-03-16 PROCEDURE — 71250 CT THORAX DX C-: CPT

## 2021-03-16 PROCEDURE — 0225U NFCT DS DNA&RNA 21 SARSCOV2: CPT

## 2021-03-16 PROCEDURE — 94760 N-INVAS EAR/PLS OXIMETRY 1: CPT

## 2022-04-02 NOTE — PROGRESS NOTE ADULT - SUBJECTIVE AND OBJECTIVE BOX
Patient is a 75y old  Male who presents with a chief complaint of Unresponsive (05 Mar 2021 18:31)      INTERVAL HPI/OVERNIGHT EVENTS:  T(C): 36.7 (03-06-21 @ 05:01), Max: 36.7 (03-06-21 @ 05:01)  HR: 98 (03-06-21 @ 05:01) (95 - 103)  BP: 136/85 (03-06-21 @ 05:01) (104/70 - 146/80)  RR: 19 (03-06-21 @ 05:01) (18 - 19)  SpO2: 99% (03-06-21 @ 05:01) (96% - 100%)  Wt(kg): --  I&O's Summary      LABS:                        14.0   6.05  )-----------( 218      ( 06 Mar 2021 06:39 )             44.5     03-06    150<H>  |  116<H>  |  32<H>  ----------------------------<  101<H>  3.7   |  28  |  1.20    Ca    9.3      06 Mar 2021 06:39  Phos  3.8     03-06  Mg     2.2     03-06          CAPILLARY BLOOD GLUCOSE      POCT Blood Glucose.: 95 mg/dL (06 Mar 2021 07:28)  POCT Blood Glucose.: 86 mg/dL (05 Mar 2021 21:06)  POCT Blood Glucose.: 98 mg/dL (05 Mar 2021 16:52)  POCT Blood Glucose.: 106 mg/dL (05 Mar 2021 11:45)        RADIOLOGY & ADDITIONAL TESTS:    Consultant(s) Notes Reviewed:  [x ] YES  [ ] NO    MEDICATIONS  (STANDING):  aspirin  chewable 81 milliGRAM(s) Oral daily  atorvastatin 80 milliGRAM(s) Oral at bedtime  chlorhexidine 2% Cloths 1 Application(s) Topical <User Schedule>  dextrose 50% Injectable 25 Gram(s) IV Push once  enoxaparin Injectable 120 milliGRAM(s) SubCutaneous two times a day  insulin lispro (ADMELOG) corrective regimen sliding scale   SubCutaneous Before meals and at bedtime  metoprolol succinate ER 25 milliGRAM(s) Oral daily    MEDICATIONS  (PRN):  acetaminophen    Suspension .. 650 milliGRAM(s) Oral every 6 hours PRN Temp greater or equal to 38C (100.4F), Mild Pain (1 - 3)  artificial  tears Solution 1 Drop(s) Both EYES every 4 hours PRN Dry Eyes  guaiFENesin   Syrup  (Sugar-Free) 100 milliGRAM(s) Oral every 4 hours PRN Cough      PHYSICAL EXAM:  GENERAL: well built, well nourished  HEAD:  Atraumatic, Normocephalic  EYES: EOMI, PERRLA, conjunctiva and sclera clear  ENT: No tonsillar erythema, exudates, or enlargement; Moist mucous membranes, Good dentition, No lesions  NECK: Supple, No JVD, Normal thyroid, no enlarged nodes  NERVOUS SYSTEM:  Alert & Oriented X1, No new FND noted  CHEST/LUNG: B/L fair air entry; No rales, rhonchi, or wheezing  HEART: S1S2 normal, no S3, Regular rate and rhythm; No murmurs, rubs, or gallops  ABDOMEN: Soft, Nontender, Nondistended; Bowel sounds present  EXTREMITIES:  2+ Peripheral Pulses, No clubbing, cyanosis, or edema  LYMPH: No lymphadenopathy noted  SKIN: No rashes or lesions    Care Discussed with Consultants/Other Providers [ x] YES  [ ] NO no

## 2022-06-01 NOTE — DIETITIAN INITIAL EVALUATION ADULT. - FEEDING SKILL
Prescription approved per [facility] Refill Protocol.  Lidia Yates RN      
total assistance